# Patient Record
Sex: FEMALE | Race: ASIAN | NOT HISPANIC OR LATINO | Employment: OTHER | URBAN - METROPOLITAN AREA
[De-identification: names, ages, dates, MRNs, and addresses within clinical notes are randomized per-mention and may not be internally consistent; named-entity substitution may affect disease eponyms.]

---

## 2017-05-25 ENCOUNTER — OFFICE VISIT (OUTPATIENT)
Dept: PLASTIC SURGERY | Facility: IMAGING CENTER | Age: 82
End: 2017-05-25
Payer: MEDICARE

## 2017-05-25 DIAGNOSIS — L72.0 EPITHELIAL INCLUSION CYST: ICD-10-CM

## 2017-05-25 DIAGNOSIS — L91.8 SKIN TAG: ICD-10-CM

## 2017-05-25 PROCEDURE — 4040F PNEUMOC VAC/ADMIN/RCVD: CPT | Performed by: SURGERY

## 2017-05-25 PROCEDURE — 1101F PT FALLS ASSESS-DOCD LE1/YR: CPT | Mod: 8P | Performed by: SURGERY

## 2017-05-25 PROCEDURE — 99212 OFFICE O/P EST SF 10 MIN: CPT | Performed by: SURGERY

## 2017-05-25 PROCEDURE — G8432 DEP SCR NOT DOC, RNG: HCPCS | Performed by: SURGERY

## 2017-05-25 PROCEDURE — G8420 CALC BMI NORM PARAMETERS: HCPCS | Performed by: SURGERY

## 2017-05-25 PROCEDURE — 1036F TOBACCO NON-USER: CPT | Performed by: SURGERY

## 2017-05-25 NOTE — PROGRESS NOTES
S:  Patient with previous excision of back lesion, here today for evaluation of multiple face and neck lesions.  Back healing well.  New lesions non-painful, but several of them are skin tags that get caught on her shirt.    O:  Face with several small epithelial inclusion cysts in forehead and cheeks.  Several skin tags on anterior neck.    A:  Face and neck lesions.    P:  Will schedule for excision in the office.

## 2017-05-25 NOTE — MR AVS SNAPSHOT
Lizz Saldana   2017 11:45 AM   Office Visit   MRN: [id number]    Department:  [facility]   Dept Phone:  [phone]    Description:  Female : 1927   Provider:  Luis Eduardo Botello M.D.           Allergies as of 2017     Allergen Noted Reactions    Codeine 2009   Nausea    Floxin [Ofloxacin] 2013   Itching, Swelling    Lidoderm 2011       rash    Toradol 2009   Unspecified    .      You were diagnosed with     Epithelial inclusion cyst   [2017]       Skin tag   [2018]         Vital Signs     Smoking Status                   Never Smoker            Basic Information     Date Of Birth Sex Race Ethnicity Preferred Language    1927 Female  Non- English      Your appointments     2017 10:00 AM   Office Procedure 1 HR with Luis Eduardo Botello M.D.   [facility] ([facility])    [address] [address]   [phone]              Problem List              ICD-10-CM Priority Class Noted - Resolved    Hyperlipidemia E78.5   Unknown - Present    DJD (degenerative joint disease) M19.90   Unknown - Present    HX: breast cancer Z85.3   Unknown - Present    Tubular adenoma D36.9   Unknown - Present    Postherpetic neuralgia B02.29   Unknown - Present    Bladder incontinence R32   Unknown - Present    HTN (hypertension), benign I10   2009 - Present    Osteoporosis M81.0   2011 - Present    Macular degeneration H35.30   2012 - Present    Pigmented skin lesion L81.9   2015 - Present    Primary localized osteoarthrosis, lower leg M17.10   2015 - Present    Primary osteoarthritis of knee M17.10   10/12/2015 - Present    Calcific tendinitis of right shoulder M75.31   2016 - Present      Health Maintenance        Date Due Completion Dates    IMM ZOSTER VACCINE 1987 ---    IMM DTaP/Tdap/Td Vaccine (1 - Tdap) 2009    IMM PNEUMOCOCCAL 65+ (ADULT) LOW/MEDIUM RISK  SERIES (2 of 2 - PCV13) 11/8/2014 11/8/2013    MAMMOGRAM 10/30/2016 10/30/2015, 9/1/2008    BONE DENSITY 6/17/2021 6/17/2016, 10/16/2013, 3/25/2010, 7/10/2007, 7/1/2007    COLONOSCOPY 12/7/2022 12/7/2012 (Declined)    Override on 12/7/2012: Patient Declined            Current Immunizations     Influenza Vaccine Adult HD 10/22/2015, 10/7/2014    Pneumococcal polysaccharide vaccine (PPSV-23) 11/8/2013    TD Vaccine 7/28/2009      Below and/or attached are the medications your provider expects you to take. Review all of your home medications and newly ordered medications with your provider and/or pharmacist. Follow medication instructions as directed by your provider and/or pharmacist. Please keep your medication list with you and share with your provider. Update the information when medications are discontinued, doses are changed, or new medications (including over-the-counter products) are added; and carry medication information at all times in the event of emergency situations     Allergies:  CODEINE - Nausea     FLOXIN - Itching,Swelling     LIDODERM - (reactions not documented)     TORADOL - Unspecified               Medications  Valid as of: May 25, 2017 - 11:46 AM    Generic Name Brand Name Tablet Size Instructions for use    Cholecalciferol   Take 5,000 Units by mouth every day.        Denosumab   Inject  as instructed.        Felodipine (TABLET SR 24 HR) PLENDIL 5 MG Take 1 Tab by mouth every day. Take 1 tablet orally every day for blood pressure.        Gemfibrozil (Tab) LOPID 600 MG Take 1 Tab by mouth 2 times a day.        Lovastatin (Tab) MEVACOR 20 MG Take 1 Tab by mouth every day.        Multiple Vitamins-Calcium   Take  by mouth every day.        Multiple Vitamins-Minerals (Tab) Ocuvite PreserVision  Take  by mouth. Two tabs bid        .                 Medicines prescribed today were sent to:     [facility] - [place], [place] - [facility]    [address]  [address]    Phone: [phone] Fax: [phone]    Open 24 Hours?: No    [id number] [place] [address] [address]    Phone: [phone] Fax: [phone]    Open 24 Hours?: No      Medication refill instructions:       If your prescription bottle indicates you have medication refills left, it is not necessary to call your provider’s office. Please contact your pharmacy and they will refill your medication.    If your prescription bottle indicates you do not have any refills left, you may request refills at any time through one of the following ways: The online [email or web address] system (except Urgent Care), by calling your provider’s office, or by asking your pharmacy to contact your provider’s office with a refill request. Medication refills are processed only during regular business hours and may not be available until the next business day. Your provider may request additional information or to have a follow-up visit with you prior to refilling your medication.   *Please Note: Medication refills are assigned a new Rx number when refilled electronically. Your pharmacy may indicate that no refills were authorized even though a new prescription for the same medication is available at the pharmacy. Please request the medicine by name with the pharmacy before contacting your provider for a refill.           MyChart Status: Patient Declined

## 2017-06-15 ENCOUNTER — OFFICE VISIT (OUTPATIENT)
Dept: PLASTIC SURGERY | Facility: IMAGING CENTER | Age: 82
End: 2017-06-15
Payer: MEDICARE

## 2017-06-15 DIAGNOSIS — L72.0 INCLUSION CYST: ICD-10-CM

## 2017-06-15 DIAGNOSIS — L91.8 SKIN TAG: ICD-10-CM

## 2017-06-15 PROCEDURE — 10040 EXTRACTION: CPT | Performed by: SURGERY

## 2017-06-15 PROCEDURE — 11200 RMVL SKIN TAGS UP TO&INC 15: CPT | Performed by: SURGERY

## 2017-06-15 NOTE — PROGRESS NOTES
Here for excision of multiple small epithelial inclusion cysts, and skin tags to face and neck.  No change since last seen.    PROCEDURE:    Areas anesthetized with 1% lidocaine with epinephrine.  Sterile prep and procedure.  Skin over two epithelial inclusion cysts on forehead, and two on right cheek opened with #11 blade; cysts easily extracted.  Larger inclusion cyst on forehead excised with ellipse of skin, closed with 5-0 fast absorbing suture.  Three large skin tags on neck excised with scissors at base; two required 5-0 sutures to close.   All areas covered with antibiotic ointment.

## 2017-06-15 NOTE — MR AVS SNAPSHOT
Lizz Saldana   6/15/2017 12:00 PM   Appointment   MRN: [id number]    Department:  [facility]   Dept Phone:  [phone]    Description:  Female : 1927   Provider:  Luis Eduardo Botello M.D.           Allergies as of 6/15/2017     Allergen Noted Reactions    Codeine 2009   Nausea    Floxin [Ofloxacin] 2013   Itching, Swelling    Lidoderm 2011       rash    Toradol 2009   Unspecified    .      Vital Signs     Smoking Status                   Never Smoker            Basic Information     Date Of Birth Sex Race Ethnicity Preferred Language    1927 Female  Non- English      Problem List              ICD-10-CM Priority Class Noted - Resolved    Hyperlipidemia E78.5   Unknown - Present    DJD (degenerative joint disease) M19.90   Unknown - Present    HX: breast cancer Z85.3   Unknown - Present    Tubular adenoma D36.9   Unknown - Present    Postherpetic neuralgia B02.29   Unknown - Present    Bladder incontinence R32   Unknown - Present    HTN (hypertension), benign I10   2009 - Present    Osteoporosis M81.0   2011 - Present    Macular degeneration H35.30   2012 - Present    Pigmented skin lesion L81.9   2015 - Present    Primary localized osteoarthrosis, lower leg M17.10   2015 - Present    Primary osteoarthritis of knee M17.10   10/12/2015 - Present    Calcific tendinitis of right shoulder M75.31   2016 - Present      Health Maintenance        Date Due Completion Dates    IMM ZOSTER VACCINE 1987 ---    IMM DTaP/Tdap/Td Vaccine (1 - Tdap) 2009    IMM PNEUMOCOCCAL 65+ (ADULT) LOW/MEDIUM RISK SERIES (2 of 2 - PCV13) 2014    MAMMOGRAM 10/30/2016 10/30/2015, 2008    BONE DENSITY 2021, 10/16/2013, 3/25/2010, 7/10/2007, 2007    COLONOSCOPY 2022 (Declined)    Override on 2012: Patient Declined            Current Immunizations     Influenza Vaccine Adult HD  10/22/2015, 10/7/2014    Pneumococcal polysaccharide vaccine (PPSV-23) 11/8/2013    TD Vaccine 7/28/2009      Below and/or attached are the medications your provider expects you to take. Review all of your home medications and newly ordered medications with your provider and/or pharmacist. Follow medication instructions as directed by your provider and/or pharmacist. Please keep your medication list with you and share with your provider. Update the information when medications are discontinued, doses are changed, or new medications (including over-the-counter products) are added; and carry medication information at all times in the event of emergency situations     Allergies:  CODEINE - Nausea     FLOXIN - Itching,Swelling     LIDODERM - (reactions not documented)     TORADOL - Unspecified               Medications  Valid as of: Yuliana 15, 2017 - 12:06 PM    Generic Name Brand Name Tablet Size Instructions for use    Cholecalciferol   Take 5,000 Units by mouth every day.        Denosumab   Inject  as instructed.        Felodipine (TABLET SR 24 HR) PLENDIL 5 MG Take 1 Tab by mouth every day. Take 1 tablet orally every day for blood pressure.        Gemfibrozil (Tab) LOPID 600 MG Take 1 Tab by mouth 2 times a day.        Lovastatin (Tab) MEVACOR 20 MG Take 1 Tab by mouth every day.        Multiple Vitamins-Calcium   Take  by mouth every day.        Multiple Vitamins-Minerals (Tab) Ocuvite PreserVision  Take  by mouth. Two tabs bid        .                 Medicines prescribed today were sent to:     [facility] - [address] [address]    Phone: [phone] Fax: [phone]    Open 24 Hours?: No    [facility] #[id number] - [place] - [address] [place] [address]    Phone: [phone] Fax: [phone]    Open 24 Hours?: No      Medication refill instructions:       If your prescription bottle indicates you have medication  refills left, it is not necessary to call your provider’s office. Please contact your pharmacy and they will refill your medication.    If your prescription bottle indicates you do not have any refills left, you may request refills at any time through one of the following ways: The online [employer] system (except Urgent Care), by calling your provider’s office, or by asking your pharmacy to contact your provider’s office with a refill request. Medication refills are processed only during regular business hours and may not be available until the next business day. Your provider may request additional information or to have a follow-up visit with you prior to refilling your medication.   *Please Note: Medication refills are assigned a new Rx number when refilled electronically. Your pharmacy may indicate that no refills were authorized even though a new prescription for the same medication is available at the pharmacy. Please request the medicine by name with the pharmacy before contacting your provider for a refill.           [employer] Status: Patient Declined

## 2017-07-18 DIAGNOSIS — I10 HTN (HYPERTENSION), BENIGN: ICD-10-CM

## 2017-07-18 DIAGNOSIS — E78.5 HYPERLIPIDEMIA, UNSPECIFIED HYPERLIPIDEMIA TYPE: ICD-10-CM

## 2017-07-18 DIAGNOSIS — M81.0 OSTEOPOROSIS, UNSPECIFIED OSTEOPOROSIS TYPE, UNSPECIFIED PATHOLOGICAL FRACTURE PRESENCE: ICD-10-CM

## 2017-07-18 RX ORDER — GEMFIBROZIL 600 MG/1
TABLET, FILM COATED ORAL
Qty: 180 TAB | Refills: 0 | Status: SHIPPED | OUTPATIENT
Start: 2017-07-18 | End: 2017-10-22 | Stop reason: SDUPTHER

## 2017-07-18 RX ORDER — LOVASTATIN 20 MG/1
TABLET ORAL
Qty: 90 TAB | Refills: 0 | Status: SHIPPED | OUTPATIENT
Start: 2017-07-18 | End: 2017-10-22 | Stop reason: SDUPTHER

## 2017-08-22 ENCOUNTER — OFFICE VISIT (OUTPATIENT)
Dept: URGENT CARE | Facility: PHYSICIAN GROUP | Age: 82
End: 2017-08-22
Payer: MEDICARE

## 2017-08-22 VITALS
OXYGEN SATURATION: 96 % | HEIGHT: 60 IN | BODY MASS INDEX: 20.1 KG/M2 | HEART RATE: 85 BPM | WEIGHT: 102.38 LBS | SYSTOLIC BLOOD PRESSURE: 138 MMHG | RESPIRATION RATE: 16 BRPM | DIASTOLIC BLOOD PRESSURE: 80 MMHG | TEMPERATURE: 99.1 F

## 2017-08-22 DIAGNOSIS — M43.6 TORTICOLLIS: ICD-10-CM

## 2017-08-22 PROCEDURE — 96372 THER/PROPH/DIAG INJ SC/IM: CPT | Performed by: PHYSICIAN ASSISTANT

## 2017-08-22 PROCEDURE — 99213 OFFICE O/P EST LOW 20 MIN: CPT | Mod: 25 | Performed by: PHYSICIAN ASSISTANT

## 2017-08-22 RX ORDER — METHYLPREDNISOLONE SODIUM SUCCINATE 125 MG/2ML
125 INJECTION, POWDER, LYOPHILIZED, FOR SOLUTION INTRAMUSCULAR; INTRAVENOUS ONCE
Status: COMPLETED | OUTPATIENT
Start: 2017-08-22 | End: 2017-08-22

## 2017-08-22 RX ORDER — AMOXICILLIN 500 MG/1
CAPSULE ORAL
COMMUNITY
Start: 2017-07-16 | End: 2017-08-25

## 2017-08-22 RX ADMIN — METHYLPREDNISOLONE SODIUM SUCCINATE 125 MG: 125 INJECTION, POWDER, LYOPHILIZED, FOR SOLUTION INTRAMUSCULAR; INTRAVENOUS at 13:37

## 2017-08-22 ASSESSMENT — ENCOUNTER SYMPTOMS
ABDOMINAL PAIN: 0
NAUSEA: 0
CONSTITUTIONAL NEGATIVE: 1
EYES NEGATIVE: 1
NECK PAIN: 1
HEADACHES: 0
RESPIRATORY NEGATIVE: 1
MYALGIAS: 1
CARDIOVASCULAR NEGATIVE: 1
BACK PAIN: 0
VOMITING: 0
NEUROLOGICAL NEGATIVE: 1
SORE THROAT: 0

## 2017-08-22 NOTE — MR AVS SNAPSHOT
Lizz Saldana   2017 12:45 PM   Office Visit   MRN: [id number]    Department:  [facility]   Dept Phone:  [phone]    Description:  Female : 1927   Provider:  Ktahi Kebede PA-C           Reason for Visit     Neck Pain C/o stiff neck x2 days.  Started with the RT side, and now LT.  Getting worse      Allergies as of 2017     Allergen Noted Reactions    Codeine 2009   Nausea    Floxin [Ofloxacin] 2013   Itching, Swelling    Lidoderm 2011       rash    Toradol 2009   Unspecified    .      You were diagnosed with     Torticollis   [191712]         Vital Signs     Blood Pressure Pulse Temperature Respirations Height Weight    138/80 mmHg 85 37.3 °C (99.1 °F) 16 1.524 m (5') 46.437 kg (102 lb 6 oz)    Body Mass Index Oxygen Saturation Smoking Status             19.99 kg/m2 96% Never Smoker          Basic Information     Date Of Birth Sex Race Ethnicity Preferred Language    1927 Female  Non- English      Your appointments     Aug 30, 2017  3:20 PM   ANNUAL EXAM PREVENTATIVE with TREVA Jones   [facility])    [address], [address]  [place] [address] [phone]              Problem List              ICD-10-CM Priority Class Noted - Resolved    Hyperlipidemia E78.5   Unknown - Present    DJD (degenerative joint disease) M19.90   Unknown - Present    HX: breast cancer Z85.3   Unknown - Present    Tubular adenoma D36.9   Unknown - Present    Postherpetic neuralgia B02.29   Unknown - Present    Bladder incontinence R32   Unknown - Present    HTN (hypertension), benign I10   2009 - Present    Osteoporosis M81.0   2011 - Present    Macular degeneration H35.30   2012 - Present    Pigmented skin lesion L81.9   2015 - Present    Primary localized osteoarthrosis, lower leg M17.10   2015 - Present    Primary osteoarthritis of knee M17.10   10/12/2015 - Present    Calcific tendinitis of right shoulder M75.31   5/27/2016 - Present      Health Maintenance        Date Due Completion Dates    IMM ZOSTER VACCINE 8/4/1987 ---    IMM DTaP/Tdap/Td Vaccine (1 - Tdap) 7/29/2009 7/28/2009    IMM PNEUMOCOCCAL 65+ (ADULT) LOW/MEDIUM RISK SERIES (2 of 2 - PCV13) 11/8/2014 11/8/2013    MAMMOGRAM 10/30/2016 10/30/2015, 9/1/2008    IMM INFLUENZA (1) 9/1/2017 10/22/2015, 10/7/2014    BONE DENSITY 6/17/2021 6/17/2016, 10/16/2013, 3/25/2010, 7/10/2007, 7/1/2007    COLONOSCOPY 12/7/2022 12/7/2012 (Declined)    Override on 12/7/2012: Patient Declined            Current Immunizations     Influenza Vaccine Adult HD 10/22/2015, 10/7/2014    Pneumococcal polysaccharide vaccine (PPSV-23) 11/8/2013    TD Vaccine 7/28/2009      Below and/or attached are the medications your provider expects you to take. Review all of your home medications and newly ordered medications with your provider and/or pharmacist. Follow medication instructions as directed by your provider and/or pharmacist. Please keep your medication list with you and share with your provider. Update the information when medications are discontinued, doses are changed, or new medications (including over-the-counter products) are added; and carry medication information at all times in the event of emergency situations     Allergies:  CODEINE - Nausea     FLOXIN - Itching,Swelling     LIDODERM - (reactions not documented)     TORADOL - Unspecified               Medications  Valid as of: August 22, 2017 -  2:20 PM    Generic Name Brand Name Tablet Size Instructions for use    Amoxicillin (Cap) AMOXIL 500 MG         Cholecalciferol   Take 5,000 Units by mouth every day.        Denosumab   Inject  as instructed.        Felodipine (TABLET SR 24 HR) PLENDIL 5 MG Take 1 Tab by mouth every day. Take 1 tablet orally every day for blood pressure.        Gemfibrozil (Tab) LOPID 600 MG TAKE 1 TABLET TWICE A DAY        Lovastatin (Tab) MEVACOR 20 MG TAKE 1  TABLET DAILY        Multiple Vitamins-Calcium   Take  by mouth every day.        Multiple Vitamins-Minerals (Tab) Ocuvite PreserVision  Take  by mouth. Two tabs bid        .                 Medicines prescribed today were sent to:     [facility] #[id number] - [address] [place] [address]    Phone: [phone] Fax: [phone]    Open 24 Hours?: No      Medication refill instructions:       If your prescription bottle indicates you have medication refills left, it is not necessary to call your provider’s office. Please contact your pharmacy and they will refill your medication.    If your prescription bottle indicates you do not have any refills left, you may request refills at any time through one of the following ways: The online [employer] system (except Urgent Care), by calling your provider’s office, or by asking your pharmacy to contact your provider’s office with a refill request. Medication refills are processed only during regular business hours and may not be available until the next business day. Your provider may request additional information or to have a follow-up visit with you prior to refilling your medication.   *Please Note: Medication refills are assigned a new Rx number when refilled electronically. Your pharmacy may indicate that no refills were authorized even though a new prescription for the same medication is available at the pharmacy. Please request the medicine by name with the pharmacy before contacting your provider for a refill.        Instructions    Acute Torticollis  Torticollis is a condition in which the muscles of the neck tighten (contract) abnormally, causing the neck to twist and the head to move into an unnatural position. Torticollis that develops suddenly is called acute torticollis. If torticollis becomes chronic and is left untreated, the face and neck can become deformed.  CAUSES  This condition may be caused by:  · Sleeping in an awkward  position (common).  · Extending or twisting the neck muscles beyond their normal position.  In some cases, the cause may not be known.  SYMPTOMS  Symptoms of this condition include:  · An unnatural position of the head.  · Neck pain.  · A limited ability to move the neck.  · Twisting of the neck to one side.  DIAGNOSIS  This condition is diagnosed with a physical exam. You may also have imaging tests, such as an X-ray, CT scan, or MRI.  TREATMENT  Treatment for this condition involves trying to relax the neck muscles. It may include:  · Medicines or shots.  · Physical therapy.  · Surgery. This may be done in severe cases.  HOME CARE INSTRUCTIONS  · Take medicines only as directed by your health care provider.  · Do stretching exercises and massage your neck as directed by your health care provider.  · Keep all follow-up visits as directed by your health care provider. This is important.  SEEK MEDICAL CARE IF:  · You develop a fever.  SEEK IMMEDIATE MEDICAL CARE IF:  · You develop difficulty breathing.  · You develop noisy breathing (stridor).  · You start drooling.  · You have trouble swallowing or have pain with swallowing.  · You develop numbness or weakness in your hands or feet.  · You have changes in your speech, understanding, or vision.  · Your pain gets worse.     This information is not intended to replace advice given to you by your health care provider. Make sure you discuss any questions you have with your health care provider.     Document Released: 12/15/2001 Document Revised: 05/03/2016 Document Reviewed: 12/14/2015  [employer] Interactive Patient Education ©2016 [employer]            MyChart Status: Patient Declined

## 2017-08-22 NOTE — PROGRESS NOTES
Subjective:      Lizz Saldana is a 89 y.o. female who presents with Neck Pain        Neck Pain   Pertinent negatives include no headaches.   Patient presents today for 2 days of neck pain and stiffness.  Patient states it started on the right side of her neck and now that is better and now is more on the left side of her neck.  She states it hurts to move most directions however moving to the left is worse.  She denies headache, eye pain, jaw pain, arm pain, numbness, tingling or chest pain. No sore throat, URI symptoms, fevers or chills.  No light sensitivity.   She denies trauma but notes she has had issues with neck pain in the past.  She may have slept it wrong she states.  Driving is difficult due to the pain.      Review of Systems   Constitutional: Negative.    HENT: Negative for ear pain and sore throat.    Eyes: Negative.    Respiratory: Negative.    Cardiovascular: Negative.    Gastrointestinal: Negative for nausea, vomiting and abdominal pain.   Musculoskeletal: Positive for myalgias and neck pain. Negative for back pain.   Skin: Negative.    Neurological: Negative.  Negative for headaches.   Endo/Heme/Allergies: Negative.        PMH:  has a past medical history of HTN; Hyperlipidemia; breast cancer; Tubular adenoma; Postherpetic neuralgia; Osteoporosis; DJD (degenerative joint disease); Pigmented skin lesion (4/30/2015); Arthritis (2015); Cancer ([facility]) (1990); High cholesterol; Pain (7-2015); and Bilateral cataracts.  MEDS:   Current outpatient prescriptions:   •  lovastatin (MEVACOR) 20 MG Tab, TAKE 1 TABLET DAILY, Disp: 90 Tab, Rfl: 0  •  gemfibrozil (LOPID) 600 MG Tab, TAKE 1 TABLET TWICE A DAY, Disp: 180 Tab, Rfl: 0  •  felodipine (PLENDIL) 5 MG TABLET SR 24 HR, Take 1 Tab by mouth every day. Take 1 tablet orally every day for blood pressure., Disp: 90 Tab, Rfl: 2  •  Multiple Vitamins-Calcium (ONE-A-DAY WOMENS PO), Take  by mouth every day., Disp: , Rfl:   •  Cholecalciferol (VITAMIN D-3 PO),  Take 5,000 Units by mouth every day., Disp: , Rfl:   •  Multiple Vitamins-Minerals (OCUVITE PRESERVISION) TABS, Take  by mouth. Two tabs bid, Disp: 120 Tab, Rfl: 6  •  amoxicillin (AMOXIL) 500 MG Cap, , Disp: , Rfl:   •  Denosumab (PROLIA SC), Inject  as instructed., Disp: , Rfl:   ALLERGIES:   Allergies   Allergen Reactions   • Codeine Nausea   • Floxin [Ofloxacin] Itching and Swelling   • Lidoderm      rash   • Toradol Unspecified     .     SURGHX:   Past Surgical History   Procedure Laterality Date   • Rotator cuff repair  1990     Left shoulder   • Cataract extraction with iol  2000     Bilateral   • Cyst excision Left 1989     ganglion cyst removal, wrist   • Mastectomy  1990     Left   • Hysterectomy, total abdominal  1970   • Knee arthroplasty total Right 7/13/2015     Procedure: KNEE ARTHROPLASTY TOTAL;  Surgeon: Ashish Serrano M.D.;  Location: SURGERY [facility];  Service:    • Knee manipulation Right 10/12/2015     Procedure: KNEE MANIPULATION;  Surgeon: Ashish Serrano M.D.;  Location: SURGERY [facility];  Service:      SOCHX:  reports that she has never smoked. She has never used smokeless tobacco. She reports that she does not drink alcohol or use illicit drugs.  FH: Family history was reviewed, no pertinent findings to report     Objective:     /80 mmHg  Pulse 85  Temp(Src) 37.3 °C (99.1 °F)  Resp 16  Ht 1.524 m (5')  Wt 46.437 kg (102 lb 6 oz)  BMI 19.99 kg/m2  SpO2 96%     Physical Exam   Constitutional: She is oriented to person, place, and time. She appears well-nourished. No distress.   HENT:   Head: Normocephalic and atraumatic.   Nose: Nose normal.   Mouth/Throat: Oropharynx is clear and moist. No oropharyngeal exudate.   Eyes: Conjunctivae and EOM are normal. Pupils are equal, round, and reactive to light.   Neck: Neck supple.       Cardiovascular: Normal rate and regular rhythm.    Pulmonary/Chest: Effort normal and breath sounds normal. No stridor.    Lymphadenopathy:     She has no cervical adenopathy.   Neurological: She is alert and oriented to person, place, and time.   Skin: Skin is warm and dry. No rash noted.   Psychiatric: She has a normal mood and affect. Her behavior is normal.   Vitals reviewed.          Assessment/Plan:     1. Torticollis  methylPREDNISolone sod succ (SOLU-MEDROL) 125 MG injection 125 mg       -given hot packs in clinic and patient states this helped it feel much better  -patient is afebrile, without headaches or any meningeal signs  -consistent with acute torticollis.  Patient allergic to Toradol, given Medrol shot for inflammation and recommend Tylenol ES as adjunct for pain.  Continue heat and stretches  -red flags discussed in detail with patient who expresses understanding.       Supportive care, differential diagnoses, and indications for immediate follow-up discussed with patient.   Pathogenesis of diagnosis discussed including typical length and natural progression.   Instructed to return to clinic or nearest emergency department for any change in condition, further concerns, or worsening of symptoms.  Patient states understanding of the plan of care and discharge instructions.  Instructed to make an appointment, for follow up, with their primary care provider.      Kathi Kebede PA-C

## 2017-08-22 NOTE — PATIENT INSTRUCTIONS
Acute Torticollis  Torticollis is a condition in which the muscles of the neck tighten (contract) abnormally, causing the neck to twist and the head to move into an unnatural position. Torticollis that develops suddenly is called acute torticollis. If torticollis becomes chronic and is left untreated, the face and neck can become deformed.  CAUSES  This condition may be caused by:  · Sleeping in an awkward position (common).  · Extending or twisting the neck muscles beyond their normal position.  In some cases, the cause may not be known.  SYMPTOMS  Symptoms of this condition include:  · An unnatural position of the head.  · Neck pain.  · A limited ability to move the neck.  · Twisting of the neck to one side.  DIAGNOSIS  This condition is diagnosed with a physical exam. You may also have imaging tests, such as an X-ray, CT scan, or MRI.  TREATMENT  Treatment for this condition involves trying to relax the neck muscles. It may include:  · Medicines or shots.  · Physical therapy.  · Surgery. This may be done in severe cases.  HOME CARE INSTRUCTIONS  · Take medicines only as directed by your health care provider.  · Do stretching exercises and massage your neck as directed by your health care provider.  · Keep all follow-up visits as directed by your health care provider. This is important.  SEEK MEDICAL CARE IF:  · You develop a fever.  SEEK IMMEDIATE MEDICAL CARE IF:  · You develop difficulty breathing.  · You develop noisy breathing (stridor).  · You start drooling.  · You have trouble swallowing or have pain with swallowing.  · You develop numbness or weakness in your hands or feet.  · You have changes in your speech, understanding, or vision.  · Your pain gets worse.     This information is not intended to replace advice given to you by your health care provider. Make sure you discuss any questions you have with your health care provider.     Document Released: 12/15/2001 Document Revised: 05/03/2016 Document  Reviewed: 12/14/2015  [email or web address] Interactive Patient Education ©2016 [employer]

## 2017-08-25 ENCOUNTER — HOSPITAL ENCOUNTER (EMERGENCY)
Facility: MEDICAL CENTER | Age: 82
End: 2017-08-25
Attending: EMERGENCY MEDICINE
Payer: MEDICARE

## 2017-08-25 ENCOUNTER — OFFICE VISIT (OUTPATIENT)
Dept: URGENT CARE | Facility: PHYSICIAN GROUP | Age: 82
End: 2017-08-25
Payer: MEDICARE

## 2017-08-25 VITALS
SYSTOLIC BLOOD PRESSURE: 130 MMHG | HEIGHT: 60 IN | HEART RATE: 80 BPM | DIASTOLIC BLOOD PRESSURE: 60 MMHG | WEIGHT: 101 LBS | OXYGEN SATURATION: 98 % | BODY MASS INDEX: 19.83 KG/M2 | RESPIRATION RATE: 16 BRPM | TEMPERATURE: 98 F

## 2017-08-25 VITALS
DIASTOLIC BLOOD PRESSURE: 61 MMHG | BODY MASS INDEX: 20.4 KG/M2 | WEIGHT: 101.19 LBS | RESPIRATION RATE: 16 BRPM | HEART RATE: 78 BPM | OXYGEN SATURATION: 95 % | TEMPERATURE: 96.4 F | HEIGHT: 59 IN | SYSTOLIC BLOOD PRESSURE: 129 MMHG

## 2017-08-25 DIAGNOSIS — R21 BLISTERING RASH: ICD-10-CM

## 2017-08-25 DIAGNOSIS — M43.6 STIFF NECK: ICD-10-CM

## 2017-08-25 DIAGNOSIS — T14.8XXA BLISTERED SKIN: ICD-10-CM

## 2017-08-25 LAB
ALBUMIN SERPL BCP-MCNC: 3.3 G/DL (ref 3.2–4.9)
ALBUMIN/GLOB SERPL: 0.9 G/DL
ALP SERPL-CCNC: 127 U/L (ref 30–99)
ALT SERPL-CCNC: 17 U/L (ref 2–50)
ANION GAP SERPL CALC-SCNC: 12 MMOL/L (ref 0–11.9)
AST SERPL-CCNC: 22 U/L (ref 12–45)
BASOPHILS # BLD AUTO: 0.3 % (ref 0–1.8)
BASOPHILS # BLD: 0.03 K/UL (ref 0–0.12)
BILIRUB SERPL-MCNC: 1.1 MG/DL (ref 0.1–1.5)
BUN SERPL-MCNC: 15 MG/DL (ref 8–22)
CALCIUM SERPL-MCNC: 8.9 MG/DL (ref 8.4–10.2)
CHLORIDE SERPL-SCNC: 104 MMOL/L (ref 96–112)
CO2 SERPL-SCNC: 24 MMOL/L (ref 20–33)
CREAT SERPL-MCNC: 0.63 MG/DL (ref 0.5–1.4)
EOSINOPHIL # BLD AUTO: 0.37 K/UL (ref 0–0.51)
EOSINOPHIL NFR BLD: 3.6 % (ref 0–6.9)
ERYTHROCYTE [DISTWIDTH] IN BLOOD BY AUTOMATED COUNT: 40.6 FL (ref 35.9–50)
GFR SERPL CREATININE-BSD FRML MDRD: >60 ML/MIN/1.73 M 2
GLOBULIN SER CALC-MCNC: 3.5 G/DL (ref 1.9–3.5)
GLUCOSE SERPL-MCNC: 89 MG/DL (ref 65–99)
HCT VFR BLD AUTO: 37.3 % (ref 37–47)
HGB BLD-MCNC: 12.9 G/DL (ref 12–16)
IMM GRANULOCYTES # BLD AUTO: 0.11 K/UL (ref 0–0.11)
IMM GRANULOCYTES NFR BLD AUTO: 1.1 % (ref 0–0.9)
LYMPHOCYTES # BLD AUTO: 2.65 K/UL (ref 1–4.8)
LYMPHOCYTES NFR BLD: 26.1 % (ref 22–41)
MCH RBC QN AUTO: 29.9 PG (ref 27–33)
MCHC RBC AUTO-ENTMCNC: 34.6 G/DL (ref 33.6–35)
MCV RBC AUTO: 86.5 FL (ref 81.4–97.8)
MONOCYTES # BLD AUTO: 0.89 K/UL (ref 0–0.85)
MONOCYTES NFR BLD AUTO: 8.8 % (ref 0–13.4)
NEUTROPHILS # BLD AUTO: 6.1 K/UL (ref 2–7.15)
NEUTROPHILS NFR BLD: 60.1 % (ref 44–72)
NRBC # BLD AUTO: 0 K/UL
NRBC BLD AUTO-RTO: 0 /100 WBC
PLATELET # BLD AUTO: 434 K/UL (ref 164–446)
PMV BLD AUTO: 9.1 FL (ref 9–12.9)
POTASSIUM SERPL-SCNC: 3.2 MMOL/L (ref 3.6–5.5)
PROT SERPL-MCNC: 6.8 G/DL (ref 6–8.2)
RBC # BLD AUTO: 4.31 M/UL (ref 4.2–5.4)
SODIUM SERPL-SCNC: 140 MMOL/L (ref 135–145)
WBC # BLD AUTO: 10.2 K/UL (ref 4.8–10.8)

## 2017-08-25 PROCEDURE — 80053 COMPREHEN METABOLIC PANEL: CPT

## 2017-08-25 PROCEDURE — 99284 EMERGENCY DEPT VISIT MOD MDM: CPT

## 2017-08-25 PROCEDURE — 85025 COMPLETE CBC W/AUTO DIFF WBC: CPT

## 2017-08-25 PROCEDURE — 700111 HCHG RX REV CODE 636 W/ 250 OVERRIDE (IP): Performed by: EMERGENCY MEDICINE

## 2017-08-25 PROCEDURE — 96375 TX/PRO/DX INJ NEW DRUG ADDON: CPT

## 2017-08-25 PROCEDURE — 96374 THER/PROPH/DIAG INJ IV PUSH: CPT

## 2017-08-25 PROCEDURE — 99214 OFFICE O/P EST MOD 30 MIN: CPT | Performed by: PHYSICIAN ASSISTANT

## 2017-08-25 PROCEDURE — 36415 COLL VENOUS BLD VENIPUNCTURE: CPT

## 2017-08-25 RX ORDER — DIPHENHYDRAMINE HYDROCHLORIDE 50 MG/ML
12.5 INJECTION INTRAMUSCULAR; INTRAVENOUS ONCE
Status: COMPLETED | OUTPATIENT
Start: 2017-08-25 | End: 2017-08-25

## 2017-08-25 RX ORDER — DEXAMETHASONE SODIUM PHOSPHATE 4 MG/ML
4 INJECTION, SOLUTION INTRA-ARTICULAR; INTRALESIONAL; INTRAMUSCULAR; INTRAVENOUS; SOFT TISSUE ONCE
Status: COMPLETED | OUTPATIENT
Start: 2017-08-25 | End: 2017-08-25

## 2017-08-25 RX ORDER — CHOLECALCIFEROL (VITAMIN D3) 125 MCG
5000 CAPSULE ORAL DAILY
Status: SHIPPED | COMMUNITY
End: 2024-02-22

## 2017-08-25 RX ADMIN — DIPHENHYDRAMINE HYDROCHLORIDE 12.5 MG: 50 INJECTION, SOLUTION INTRAMUSCULAR; INTRAVENOUS at 10:32

## 2017-08-25 RX ADMIN — DEXAMETHASONE SODIUM PHOSPHATE 4 MG: 4 INJECTION, SOLUTION INTRAMUSCULAR; INTRAVENOUS at 10:32

## 2017-08-25 ASSESSMENT — ENCOUNTER SYMPTOMS
RESPIRATORY NEGATIVE: 1
EYES NEGATIVE: 1
NEUROLOGICAL NEGATIVE: 1
HEADACHES: 0
NAUSEA: 0
CARDIOVASCULAR NEGATIVE: 1
VOMITING: 0

## 2017-08-25 ASSESSMENT — PAIN SCALES - GENERAL: PAINLEVEL_OUTOF10: 9

## 2017-08-25 NOTE — PROGRESS NOTES
Subjective:      Lizz Saldana is a 89 y.o. female who presents with Blisters        HPI  Patient presents today for follow up to neck stiffness.  She was seen in  3 days ago for 2 days complaint of neck stiffness.  It had started on the right and moved to the left. She had ROM however painful with movement to the left.  Heat felt better, negative ROS and otherwise normal physical patient was given Solumedrol for anti-inflammatory (Toradol allergy) and given warm packs.     Patient returns today that her neck is still hurting, no worse but no better.  She is still having more pain on the left.  No fevers, chills or sweats.   She also reports since yesterday she has had bilateral leg redness, burning and large blisters on the inner aspects of her ankles.    She also feels itchy across her chest.   She denies injury or legs.  No hx of rashes similar to this.  No new meds or changes to meds (other than the single Solumedrol injection 3 days ago)   Still currently denying headache, light sensitivity, numbness, tingling or focal weakness.     Review of Systems   Constitutional:        SEE HPI   Eyes: Negative.    Respiratory: Negative.    Cardiovascular: Negative.    Gastrointestinal: Negative for nausea and vomiting.   Musculoskeletal:        SEE HPI   Skin: Positive for itching and rash.   Neurological: Negative.  Negative for headaches.   Endo/Heme/Allergies: Negative.        PMH:  has a past medical history of HTN; Hyperlipidemia; breast cancer; Tubular adenoma; Postherpetic neuralgia; Osteoporosis; DJD (degenerative joint disease); Pigmented skin lesion (4/30/2015); Arthritis (2015); Cancer (CMS-HCC) (1990); High cholesterol; Pain (7-2015); and Bilateral cataracts.  MEDS:   Current outpatient prescriptions:   •  lovastatin (MEVACOR) 20 MG Tab, TAKE 1 TABLET DAILY, Disp: 90 Tab, Rfl: 0  •  gemfibrozil (LOPID) 600 MG Tab, TAKE 1 TABLET TWICE A DAY, Disp: 180 Tab, Rfl: 0  •  felodipine (PLENDIL) 5 MG TABLET SR 24 HR,  Take 1 Tab by mouth every day. Take 1 tablet orally every day for blood pressure., Disp: 90 Tab, Rfl: 2  •  Multiple Vitamins-Calcium (ONE-A-DAY WOMENS PO), Take  by mouth every day., Disp: , Rfl:   •  Cholecalciferol (VITAMIN D-3 PO), Take 5,000 Units by mouth every day., Disp: , Rfl:   •  Multiple Vitamins-Minerals (OCUVITE PRESERVISION) TABS, Take  by mouth. Two tabs bid, Disp: 120 Tab, Rfl: 6  •  Denosumab (PROLIA SC), Inject  as instructed., Disp: , Rfl:   ALLERGIES:   Allergies   Allergen Reactions   • Codeine Nausea   • Floxin [Ofloxacin] Itching and Swelling   • Lidoderm      rash   • Toradol Unspecified     .     SURGHX:   Past Surgical History   Procedure Laterality Date   • Rotator cuff repair  1990     Left shoulder   • Cataract extraction with iol  2000     Bilateral   • Cyst excision Left 1989     ganglion cyst removal, wrist   • Mastectomy  1990     Left   • Hysterectomy, total abdominal  1970   • Knee arthroplasty total Right 7/13/2015     Procedure: KNEE ARTHROPLASTY TOTAL;  Surgeon: Ashish Serrano M.D.;  Location: SURGERY [facility];  Service:    • Knee manipulation Right 10/12/2015     Procedure: KNEE MANIPULATION;  Surgeon: Ashish Serrano M.D.;  Location: SURGERY [facility];  Service:      SOCHX:  reports that she has never smoked. She has never used smokeless tobacco. She reports that she does not drink alcohol or use illicit drugs.  FH: Family history was reviewed, no pertinent findings to report     Objective:     /60 mmHg  Pulse 80  Temp(Src) 36.7 °C (98 °F)  Resp 16  Ht 1.524 m (5')  Wt 45.813 kg (101 lb)  BMI 19.73 kg/m2  SpO2 98%     Physical Exam   Constitutional: She is oriented to person, place, and time. She appears well-developed and well-nourished. No distress.   HENT:   Head: Normocephalic and atraumatic.   Eyes: Conjunctivae and EOM are normal. Pupils are equal, round, and reactive to light.   Neck:       Cardiovascular: Normal rate and regular  rhythm.    Musculoskeletal:        Feet:    Neurological: She is alert and oriented to person, place, and time.   Skin: Skin is warm and dry. Rash noted.   Psychiatric: She has a normal mood and affect. Her behavior is normal.   Vitals reviewed.            Assessment/Plan:     1. Stiff neck     2. Blistered skin         -patient in obvious discomfort with her neck in clinic and with lower legs  -at this point there is no clear diagnosis for the bilateral blistered/erythematous skin without trauma or hx of leg swelling/dermatitis.    Concern for systemic process given entire clinical presenation.     Vitals stable however I feel given presentation and patient age she requires a more rapid work up in a more controlled setting.    Patient and  referred to ED for further work up.   Offered various options, they will decide en route.           Kathi Kebede PA-C

## 2017-08-25 NOTE — ED AVS SNAPSHOT
Toplist Access Code: Activation code not generated  Current Toplist Status: Patient Declined    Your email address is not on file at [facility].  Email Addresses are required for you to sign up for Toplist, please contact [phone] to verify your personal information and to provide your email address prior to attempting to register for Toplist.    Lizz Saldana  [address] [place] [address]    Toplist  A secure, online tool to manage your health information     [facility]’s Toplist® is a secure, online tool that connects you to your personalized health information from the privacy of your home -- day or night - making it very easy for you to manage your healthcare. Once the activation process is completed, you can even access your medical information using the Toplist jf, which is available for free in the Apple Jf store or Google Play store.     To learn more about Toplist, visit [email or web address]    There are two levels of access available (as shown below):   My Chart Features  [facility] Primary Care Doctor [facility]  Specialists [facility]  Urgent  Care Non-[facility] Primary Care Doctor   Email your healthcare team securely and privately 24/7 X X X    Manage appointments: schedule your next appointment; view details of past/upcoming appointments X      Request prescription refills. X      View recent personal medical records, including lab and immunizations X X X X   View health record, including health history, allergies, medications X X X X   Read reports about your outpatient visits, procedures, consult and ER notes X X X X   See your discharge summary, which is a recap of your hospital and/or ER visit that includes your diagnosis, lab results, and care plan X X  X     How to register for [employer]:  Once your e-mail address has been verified, follow the following steps to sign up for [employer].     1. Go to  [email or web address]  2. Click on the Sign Up Now box, which takes you to the New Member Sign Up  page. You will need to provide the following information:  a. Enter your [employer]ume Access Code exactly as it appears at the top of this page. (You will not need to use this code after you’ve completed the sign-up process. If you do not sign up before the expiration date, you must request a new code.)   b. Enter your date of birth.   c. Enter your home email address.   d. Click Submit, and follow the next screen’s instructions.  3. Create a Razume ID. This will be your Razume login ID and cannot be changed, so think of one that is secure and easy to remember.  4. Create a Razume password. You can change your password at any time.  5. Enter your Password Reset Question and Answer. This can be used at a later time if you forget your password.   6. Enter your e-mail address. This allows you to receive e-mail notifications when new information is available in Razume.  7. Click Sign Up. You can now view your health information.    For assistance activating your Razume account, call [phone]

## 2017-08-25 NOTE — ED NOTES
The Medication Reconciliation process has been completed by interviewing the patient    Allergies have been reviewed  Antibiotic use in 30 days - none    Home Pharmacy:  [facility] [place]

## 2017-08-25 NOTE — ED NOTES
Tuesday this pt received a steroid shot from an Urgent care for neck pain, today she is here fro continued severe neck pain without trauma and bilateral lower leg rash with blistering.

## 2017-08-25 NOTE — ED AVS SNAPSHOT
8/25/2017    Lizz Saldana  [address] [address]    Dear Lizz:    [facility] wants to ensure your discharge home is safe and you or your loved ones have had all of your questions answered regarding your care after you leave the hospital.    Below is a list of resources and contact information should you have any questions regarding your hospital stay, follow-up instructions, or active medical symptoms.    Questions or Concerns Regarding… Contact   Medical Questions Related to Your Discharge  (7 days a week, 8am-5pm) Contact a Nurse Care Coordinator   [phone]   Medical Questions Not Related to Your Discharge  (24 hours a day / 7 days a week)  Contact the Nurse Health Line   [phone]    Medications or Discharge Instructions Refer to your discharge packet   or contact your [facility] Primary Care Provider   [phone]   Follow-up Appointment(s) Schedule your appointment via Cylex   or contact Scheduling [phone]   Billing Review your statement via Cylex  or contact Billing [phone]   Medical Records Review your records via Cylex   or contact Medical Records [phone]     You may receive a telephone call within two days of discharge. This call is to make certain you understand your discharge instructions and have the opportunity to have any questions answered. You can also easily access your medical information, test results and upcoming appointments via the Cylex free online health management tool. You can learn more and sign up at [email or web address]. For assistance setting up your Cylex account, please call [phone].    Once again, we want to ensure your discharge home is safe and that you have a clear understanding of any next steps in your care. If you have any questions or concerns, please do not hesitate to contact us, we are here for you. Thank you for choosing [facility] for your healthcare needs.    Sincerely,    Your [facility] Healthcare Team

## 2017-08-25 NOTE — ED PROVIDER NOTES
ED Provider Note    CHIEF COMPLAINT  Chief Complaint   Patient presents with   • Neck Pain   • Rash       HPI  Lizz Saldana is a 89 y.o. female who presents for evaluation of painful rash. The patient was apparently at an urgent care a few days ago. She had reportedly a spasmodic torticollis. She was given an intramuscular shot of Solu-Medrol. She subsequently developed a painful red blistering rash on the bilateral lower extremities anteriorly as well as on the anterior chest. No reported pain or swelling to the mouth no trouble breathing. She denies high fevers or chills. No associated night sweats weight loss numbness weakness or tingling    REVIEW OF SYSTEMS  See HPI for further details. No high fevers chills night sweats weight loss numbness tingling or weakness All other systems are negative.     PAST MEDICAL HISTORY  Past Medical History   Diagnosis Date   • HTN    • Hyperlipidemia    • HX: breast cancer    • Tubular adenoma    • Postherpetic neuralgia    • Osteoporosis    • DJD (degenerative joint disease)    • Pigmented skin lesion 4/30/2015   • Arthritis 2015     left knee   • Cancer (CMS-[facility]) 1990     Breast    • High cholesterol    • Pain 7-2015     right knee   • Bilateral cataracts      bilateral IOL       FAMILY HISTORY  No history of bleeding disorder    SOCIAL HISTORY  Social History     Social History   • Marital Status:      Spouse Name: N/A   • Number of Children: N/A   • Years of Education: N/A     Social History Main Topics   • Smoking status: Never Smoker    • Smokeless tobacco: Never Used   • Alcohol Use: No   • Drug Use: No   • Sexual Activity: Not on file     Other Topics Concern   • Not on file     Social History Narrative    nonsmoker    SURGICAL HISTORY  Past Surgical History   Procedure Laterality Date   • Rotator cuff repair  1990     Left shoulder   • Cataract extraction with iol  2000     Bilateral   • Cyst excision Left 1989     ganglion cyst removal, wrist   •  "Mastectomy  1990     Left   • Hysterectomy, total abdominal  1970   • Knee arthroplasty total Right 7/13/2015     Procedure: KNEE ARTHROPLASTY TOTAL;  Surgeon: Ashish Serrano M.D.;  Location: SURGERY [facility];  Service:    • Knee manipulation Right 10/12/2015     Procedure: KNEE MANIPULATION;  Surgeon: Ashish Serrano M.D.;  Location: SURGERY [facility];  Service:        CURRENT MEDICATIONS  No current facility-administered medications for this encounter.    Current outpatient prescriptions:   •  lovastatin (MEVACOR) 20 MG Tab, TAKE 1 TABLET DAILY, Disp: 90 Tab, Rfl: 0  •  gemfibrozil (LOPID) 600 MG Tab, TAKE 1 TABLET TWICE A DAY, Disp: 180 Tab, Rfl: 0  •  felodipine (PLENDIL) 5 MG TABLET SR 24 HR, Take 1 Tab by mouth every day. Take 1 tablet orally every day for blood pressure., Disp: 90 Tab, Rfl: 2  •  Denosumab (PROLIA SC), Inject  as instructed., Disp: , Rfl:   •  Multiple Vitamins-Calcium (ONE-A-DAY WOMENS PO), Take  by mouth every day., Disp: , Rfl:   •  Cholecalciferol (VITAMIN D-3 PO), Take 5,000 Units by mouth every day., Disp: , Rfl:   •  Multiple Vitamins-Minerals (OCUVITE PRESERVISION) TABS, Take  by mouth. Two tabs bid, Disp: 120 Tab, Rfl: 6      ALLERGIES  Allergies   Allergen Reactions   • Codeine Nausea   • Floxin [Ofloxacin] Itching and Swelling   • Lidoderm      rash   • Toradol Unspecified     .       PHYSICAL EXAM  VITAL SIGNS: /61 mmHg  Pulse 80  Temp(Src) 35.8 °C (96.4 °F)  Resp 16  Ht 1.499 m (4' 11\")  Wt 45.9 kg (101 lb 3.1 oz)  BMI 20.43 kg/m2 Room air O2: 96    Constitutional: Well developed, Well nourished, No acute distress, Non-toxic appearance.   HENT: Normocephalic, Atraumatic, Bilateral external ears normal, Oropharynx moist, No oral exudates, Nose normal.   Eyes: PERRLA, EOMI, Conjunctiva normal, No discharge.   Neck: Normal range of motion, No tenderness, Supple, No stridor.   Cardiovascular: Normal heart rate, Normal rhythm, No murmurs, No rubs, " No gallops.   Thorax & Lungs: Normal breath sounds, No respiratory distress, No wheezing, No chest tenderness.   Abdomen: Bowel sounds normal, Soft, No tenderness, No masses, No pulsatile masses.   Skin: There is no erythematous, blistering rash without vesicles on the bilateral anterior legs. There is a erythematous tender rash on the anterior chest without blisters  Back: No tenderness, No CVA tenderness.   Extremities: Intact distal pulses, No edema, No tenderness, No cyanosis, No clubbing.   Musculoskeletal: Good range of motion in all major joints. No tenderness to palpation or major deformities noted.   Neurologic: Alert & oriented x 3, Normal motor function, Normal sensory function, No focal deficits noted.   Psychiatric: Anxious       COURSE & MEDICAL DECISION MAKING  Pertinent Labs & Imaging studies reviewed. (See chart for details)  Results for orders placed or performed during the hospital encounter of 08/25/17   CBC WITH DIFFERENTIAL   Result Value Ref Range    WBC 10.2 4.8 - 10.8 K/uL    RBC 4.31 4.20 - 5.40 M/uL    Hemoglobin 12.9 12.0 - 16.0 g/dL    Hematocrit 37.3 37.0 - 47.0 %    MCV 86.5 81.4 - 97.8 fL    MCH 29.9 27.0 - 33.0 pg    MCHC 34.6 33.6 - 35.0 g/dL    RDW 40.6 35.9 - 50.0 fL    Platelet Count 434 164 - 446 K/uL    MPV 9.1 9.0 - 12.9 fL    Neutrophils-Polys 60.10 44.00 - 72.00 %    Lymphocytes 26.10 22.00 - 41.00 %    Monocytes 8.80 0.00 - 13.40 %    Eosinophils 3.60 0.00 - 6.90 %    Basophils 0.30 0.00 - 1.80 %    Immature Granulocytes 1.10 (H) 0.00 - 0.90 %    Nucleated RBC 0.00 /100 WBC    Neutrophils (Absolute) 6.10 2.00 - 7.15 K/uL    Lymphs (Absolute) 2.65 1.00 - 4.80 K/uL    Monos (Absolute) 0.89 (H) 0.00 - 0.85 K/uL    Eos (Absolute) 0.37 0.00 - 0.51 K/uL    Baso (Absolute) 0.03 0.00 - 0.12 K/uL    Immature Granulocytes (abs) 0.11 0.00 - 0.11 K/uL    NRBC (Absolute) 0.00 K/uL   COMP METABOLIC PANEL   Result Value Ref Range    Sodium 140 135 - 145 mmol/L    Potassium 3.2 (L) 3.6 -  5.5 mmol/L    Chloride 104 96 - 112 mmol/L    Co2 24 20 - 33 mmol/L    Anion Gap 12.0 (H) 0.0 - 11.9    Glucose 89 65 - 99 mg/dL    Bun 15 8 - 22 mg/dL    Creatinine 0.63 0.50 - 1.40 mg/dL    Calcium 8.9 8.4 - 10.2 mg/dL    AST(SGOT) 22 12 - 45 U/L    ALT(SGPT) 17 2 - 50 U/L    Alkaline Phosphatase 127 (H) 30 - 99 U/L    Total Bilirubin 1.1 0.1 - 1.5 mg/dL    Albumin 3.3 3.2 - 4.9 g/dL    Total Protein 6.8 6.0 - 8.2 g/dL    Globulin 3.5 1.9 - 3.5 g/dL    A-G Ratio 0.9 g/dL   ESTIMATED GFR   Result Value Ref Range    GFR If African American >60 >60 mL/min/1.73 m 2    GFR If Non African American >60 >60 mL/min/1.73 m 2        The patient does not appear clinically toxic. She does have what appears to be an allergic reaction with a blistering rash to the anterior legs. The only new medication was an intramuscular shot of Solu-Medrol a few days ago which is likely the culprit. I did give her a small dose of IV Benadryl as well as Decadron. I see no evidence of bacterial infection and I will advise the patient and her  to expectantly manages these blisters and not actively try to pop them. Follow up with PCP in 2-3 days    FINAL IMPRESSION  1.  1. Blistering rash          Electronically signed by: Vignesh Gregg, 8/25/2017 9:51 AM

## 2017-08-25 NOTE — DISCHARGE INSTRUCTIONS
Drug Rash  A drug rash is a change in the color or texture of the skin that is caused by a drug. It can develop minutes, hours, or days after the person takes the drug.  CAUSES  This condition is usually caused by a drug allergy. It can also be caused by exposure to sunlight after taking a drug that makes the skin sensitive to light. Drugs that commonly cause rashes include:  · Penicillin.  · Antibiotic medicines.  · Medicines that treat seizures.  · Medicines that treat cancer (chemotherapy).  · Aspirin and other nonsteroidal anti-inflammatory drugs (NSAIDs).  · Injectable dyes that contain iodine.  · Insulin.  SYMPTOMS  Symptoms of this condition include:  · Redness.  · Tiny bumps.  · Peeling.  · Itching.  · Itchy welts (hives).  · Swelling.  The rash may appear on a small area of skin or all over the body.  DIAGNOSIS  To diagnose the condition, your health care provider will do a physical exam. He or she may also order tests to find out which drug caused the rash. Tests to find the cause of a rash include:  · Skin tests.  · Blood tests.  · Drug challenge. For this test, you stop taking all of the drugs that you do not need to take, and then you start taking them again by adding back one of the drugs at a time.  TREATMENT  A drug rash may be treated with medicines, including:  · Antihistamines. These may be given to relieve itching.  · An NSAID. This may be given to reduce swelling and treat pain.  · A steroid drug. This may be given to reduce swelling.  The rash usually goes away when the person stops taking the drug that caused it.  HOME CARE INSTRUCTIONS  · Take medicines only as directed by your health care provider.  · Let all of your health care providers know about any drug reactions you have had in the past.  · If you have hives, take a cool shower or use a cool compress to relieve itchiness.  SEEK MEDICAL CARE IF:  · You have a fever.  · Your rash is not going away.  · Your rash gets worse.  · Your rash  comes back.  · You have wheezing or coughing.  SEEK IMMEDIATE MEDICAL CARE IF:  · You start to have breathing problems.  · You start to have shortness of breath.  · You face or throat starts to swell.  · You have severe weakness with dizziness or fainting.  · You have chest pain.     This information is not intended to replace advice given to you by your health care provider. Make sure you discuss any questions you have with your health care provider.     Document Released: 01/25/2006 Document Revised: 01/08/2016 Document Reviewed: 10/14/2015  [employer] Interactive Patient Education ©2016 [employer]

## 2017-08-25 NOTE — ED AVS SNAPSHOT
Home Care Instructions                                                                                                                Lizz Saldana   MRN: [id number]    Department:  [facility], Emergency Dept   Date of Visit:  8/25/2017            [facility], Emergency Dept    [address] [facility]    [place] [address]    Phone:  [phone]      You were seen by     Vignesh Gregg M.D.      Your Diagnosis Was     Blistering rash     R21       These are the medications you received during your hospitalization from 08/25/2017 0922 to 08/25/2017 1115     Date/Time Order Dose Route Action    08/25/2017 1032 diphenhydrAMINE (BENADRYL) injection 12.5 mg 12.5 mg Intravenous Given    08/25/2017 1032 dexamethasone (DECADRON) injection 4 mg 4 mg Intravenous Given      Follow-up Information     1. Follow up with Zayda Soriano D.O. In 2 days.    Specialty:  Internal Medicine    Why:  As needed, If symptoms worsen    Contact information    [address]  [address]  [place] [address] [phone]        Medication Information     Review all of your home medications and newly ordered medications with your primary doctor and/or pharmacist as soon as possible. Follow medication instructions as directed by your doctor and/or pharmacist.     Please keep your complete medication list with you and share with your physician. Update the information when medications are discontinued, doses are changed, or new medications (including over-the-counter products) are added; and carry medication information at all times in the event of emergency situations.               Medication List      ASK your doctor about these medications        Instructions    Morning Afternoon Evening Bedtime    felodipine 5 MG Tb24   Commonly known as:  PLENDIL        Take 1 Tab by mouth every day. Take 1 tablet orally every day for blood pressure.   Dose:  5 mg                        gemfibrozil  600 MG Tabs   Commonly known as:  LOPID        Doctor's comments:  Please remind pt to get labs done   TAKE 1 TABLET TWICE A DAY                        lovastatin 20 MG Tabs   Commonly known as:  MEVACOR        Doctor's comments:  Please remind pt to get labs done   TAKE 1 TABLET DAILY                        Ocuvite PreserVision Tabs        Take  by mouth. Two tabs bid                        ONE-A-DAY WOMENS PO        Take  by mouth every day.                        Vitamin D3 2000 units Tabs        Take 2,000 Units by mouth every day.   Dose:  2000 Units                                Procedures and tests performed during your visit     CBC WITH DIFFERENTIAL    COMP METABOLIC PANEL    ESTIMATED GFR    SALINE LOCK        Discharge Instructions       Drug Rash  A drug rash is a change in the color or texture of the skin that is caused by a drug. It can develop minutes, hours, or days after the person takes the drug.  CAUSES  This condition is usually caused by a drug allergy. It can also be caused by exposure to sunlight after taking a drug that makes the skin sensitive to light. Drugs that commonly cause rashes include:  · Penicillin.  · Antibiotic medicines.  · Medicines that treat seizures.  · Medicines that treat cancer (chemotherapy).  · Aspirin and other nonsteroidal anti-inflammatory drugs (NSAIDs).  · Injectable dyes that contain iodine.  · Insulin.  SYMPTOMS  Symptoms of this condition include:  · Redness.  · Tiny bumps.  · Peeling.  · Itching.  · Itchy welts (hives).  · Swelling.  The rash may appear on a small area of skin or all over the body.  DIAGNOSIS  To diagnose the condition, your health care provider will do a physical exam. He or she may also order tests to find out which drug caused the rash. Tests to find the cause of a rash include:  · Skin tests.  · Blood tests.  · Drug challenge. For this test, you stop taking all of the drugs that you do not need to take, and then you start taking them again  by adding back one of the drugs at a time.  TREATMENT  A drug rash may be treated with medicines, including:  · Antihistamines. These may be given to relieve itching.  · An NSAID. This may be given to reduce swelling and treat pain.  · A steroid drug. This may be given to reduce swelling.  The rash usually goes away when the person stops taking the drug that caused it.  HOME CARE INSTRUCTIONS  · Take medicines only as directed by your health care provider.  · Let all of your health care providers know about any drug reactions you have had in the past.  · If you have hives, take a cool shower or use a cool compress to relieve itchiness.  SEEK MEDICAL CARE IF:  · You have a fever.  · Your rash is not going away.  · Your rash gets worse.  · Your rash comes back.  · You have wheezing or coughing.  SEEK IMMEDIATE MEDICAL CARE IF:  · You start to have breathing problems.  · You start to have shortness of breath.  · You face or throat starts to swell.  · You have severe weakness with dizziness or fainting.  · You have chest pain.     This information is not intended to replace advice given to you by your health care provider. Make sure you discuss any questions you have with your health care provider.     Document Released: 01/25/2006 Document Revised: 01/08/2016 Document Reviewed: 10/14/2015  Elsevier Interactive Patient Education ©2016 [employer]            Patient Information     Patient Information    Following emergency treatment: all patient requiring follow-up care must return either to a private physician or a clinic if your condition worsens before you are able to obtain further medical attention, please return to the emergency room.     Billing Information    At [facility], we work to make the billing process streamlined for our patients.  Our Representatives are here to answer any questions you may have regarding your hospital bill.  If you have insurance coverage and have supplied your insurance  information to us, we will submit a claim to your insurer on your behalf.  Should you have any questions regarding your bill, we can be reached online or by phone as follows:  Online: You are able pay your bills online or live chat with our representatives about any billing questions you may have. We are here to help Monday - Friday from 8:00am to 7:30pm and 9:00am - 12:00pm on Saturdays.  Please visit [email or web address]  for more information.   Phone:  [phone] or [phone]    Please note that your emergency physician, surgeon, pathologist, radiologist, anesthesiologist, and other specialists are not employed by [facility] and will therefore bill separately for their services.  Please contact them directly for any questions concerning their bills at the numbers below:     Emergency Physician Services:  [phone]  [facility]:  [phone]  [facility]:  [phone]  [facility]:  [phone]    1. Your final bill may vary from the amount quoted upon discharge if all procedures are not complete at that time, or if your doctor has additional procedures of which we are not aware. You will receive an additional bill if you return to the Emergency Department at [facility] for suture removal regardless of the facility of which the sutures were placed.     2. Please arrange for settlement of this account at the emergency registration.    3. All self-pay accounts are due in full at the time of treatment.  If you are unable to meet this obligation then payment is expected within 4-5 days.     4. If you have had radiology studies (CT, X-ray, Ultrasound, MRI), you have received a preliminary result during your emergency department visit. Please contact the radiology department [phone] to receive a copy of your final result. Please discuss the Final result with your primary physician or with the follow up physician  provided.     Crisis Hotline:  [place] Crisis Hotline:  9-261-XQWIHWL or 1-847.760.3509  [place] Crisis Hotline:    1-541.849.4504 or 176-816-7142         ED Discharge Follow Up Questions    1. In order to provide you with very good care, we would like to follow up with a phone call in the next few days.  May we have your permission to contact you?     YES /  NO    2. What is the best phone number to call you? (       )_____-__________    3. What is the best time to call you?      Morning  /  Afternoon  /  Evening                   Patient Signature:  ____________________________________________________________    Date:  ____________________________________________________________      Your appointments     Aug 30, 2017  3:20 PM   ANNUAL EXAM PREVENTATIVE with TREVA Jones   [facility]    [address] [place] [address], [address]  [place] [id number] [phone]

## 2017-08-25 NOTE — MR AVS SNAPSHOT
Lizz Saldana   2017 8:15 AM   Office Visit   MRN: [id number]    Department:  [facility]   Dept Phone:  [phone]    Description:  Female : 1927   Provider:  Kathi Kebede PA-C           Reason for Visit     Blisters L.leg blisters, L. leg swelling/ redeness, stiff neck, itchy chest X 4 days       Allergies as of 2017     Allergen Noted Reactions    Codeine 2009   Nausea    Floxin [Ofloxacin] 2013   Itching, Swelling    Lidoderm 2011       rash    Toradol 2009   Unspecified    .      You were diagnosed with     Stiff neck   [207333]       Blistered skin   [087060]         Vital Signs     Blood Pressure Pulse Temperature Respirations Height Weight    130/60 mmHg 80 36.7 °C (98 °F) 16 1.524 m (5') 45.813 kg (101 lb)    Body Mass Index Oxygen Saturation Smoking Status             19.73 kg/m2 98% Never Smoker          Basic Information     Date Of Birth Sex Race Ethnicity Preferred Language    1927 Female  Non- English      Your appointments     Aug 30, 2017  3:20 PM   ANNUAL EXAM PREVENTATIVE with TREVA Jones   [facility]    [address], [address]  [facility] [address] [phone]              Problem List              ICD-10-CM Priority Class Noted - Resolved    Hyperlipidemia E78.5   Unknown - Present    DJD (degenerative joint disease) M19.90   Unknown - Present    HX: breast cancer Z85.3   Unknown - Present    Tubular adenoma D36.9   Unknown - Present    Postherpetic neuralgia B02.29   Unknown - Present    Bladder incontinence R32   Unknown - Present    HTN (hypertension), benign I10   2009 - Present    Osteoporosis M81.0   2011 - Present    Macular degeneration H35.30   2012 - Present    Pigmented skin lesion L81.9   2015 - Present    Primary localized osteoarthrosis, lower leg M17.10   2015 - Present    Primary osteoarthritis of knee M17.10    10/12/2015 - Present    Calcific tendinitis of right shoulder M75.31   5/27/2016 - Present      Health Maintenance        Date Due Completion Dates    IMM ZOSTER VACCINE 8/4/1987 ---    IMM DTaP/Tdap/Td Vaccine (1 - Tdap) 7/29/2009 7/28/2009    IMM PNEUMOCOCCAL 65+ (ADULT) LOW/MEDIUM RISK SERIES (2 of 2 - PCV13) 11/8/2014 11/8/2013    MAMMOGRAM 10/30/2016 10/30/2015, 9/1/2008    IMM INFLUENZA (1) 9/1/2017 10/22/2015, 10/7/2014    BONE DENSITY 6/17/2021 6/17/2016, 10/16/2013, 3/25/2010, 7/10/2007, 7/1/2007    COLONOSCOPY 12/7/2022 12/7/2012 (Declined)    Override on 12/7/2012: Patient Declined            Current Immunizations     Influenza Vaccine Adult HD 10/22/2015, 10/7/2014    Pneumococcal polysaccharide vaccine (PPSV-23) 11/8/2013    TD Vaccine 7/28/2009      Below and/or attached are the medications your provider expects you to take. Review all of your home medications and newly ordered medications with your provider and/or pharmacist. Follow medication instructions as directed by your provider and/or pharmacist. Please keep your medication list with you and share with your provider. Update the information when medications are discontinued, doses are changed, or new medications (including over-the-counter products) are added; and carry medication information at all times in the event of emergency situations     Allergies:  CODEINE - Nausea     FLOXIN - Itching,Swelling     LIDODERM - (reactions not documented)     TORADOL - Unspecified               Medications  Valid as of: August 25, 2017 -  9:24 AM    Generic Name Brand Name Tablet Size Instructions for use    Cholecalciferol   Take 5,000 Units by mouth every day.        Denosumab   Inject  as instructed.        Felodipine (TABLET SR 24 HR) PLENDIL 5 MG Take 1 Tab by mouth every day. Take 1 tablet orally every day for blood pressure.        Gemfibrozil (Tab) LOPID 600 MG TAKE 1 TABLET TWICE A DAY        Lovastatin (Tab) MEVACOR 20 MG TAKE 1 TABLET DAILY          Multiple Vitamins-Calcium   Take  by mouth every day.        Multiple Vitamins-Minerals (Tab) Ocuvite PreserVision  Take  by mouth. Two tabs bid        .                 Medicines prescribed today were sent to:     [id number] [address] [place] [address]    Phone: [phone] Fax: [phone]    Open 24 Hours?: No      Medication refill instructions:       If your prescription bottle indicates you have medication refills left, it is not necessary to call your provider’s office. Please contact your pharmacy and they will refill your medication.    If your prescription bottle indicates you do not have any refills left, you may request refills at any time through one of the following ways: The online [employer] system (except Urgent Care), by calling your provider’s office, or by asking your pharmacy to contact your provider’s office with a refill request. Medication refills are processed only during regular business hours and may not be available until the next business day. Your provider may request additional information or to have a follow-up visit with you prior to refilling your medication.   *Please Note: Medication refills are assigned a new Rx number when refilled electronically. Your pharmacy may indicate that no refills were authorized even though a new prescription for the same medication is available at the pharmacy. Please request the medicine by name with the pharmacy before contacting your provider for a refill.           R[facility] Status: Patient Declined

## 2017-08-30 ENCOUNTER — OFFICE VISIT (OUTPATIENT)
Dept: MEDICAL GROUP | Facility: PHYSICIAN GROUP | Age: 82
End: 2017-08-30
Payer: MEDICARE

## 2017-08-30 ENCOUNTER — OFFICE VISIT (OUTPATIENT)
Dept: URGENT CARE | Facility: PHYSICIAN GROUP | Age: 82
End: 2017-08-30
Payer: MEDICARE

## 2017-08-30 VITALS
SYSTOLIC BLOOD PRESSURE: 126 MMHG | RESPIRATION RATE: 20 BRPM | BODY MASS INDEX: 20.03 KG/M2 | OXYGEN SATURATION: 98 % | HEART RATE: 73 BPM | HEIGHT: 60 IN | DIASTOLIC BLOOD PRESSURE: 74 MMHG | TEMPERATURE: 99 F | WEIGHT: 102 LBS

## 2017-08-30 VITALS
WEIGHT: 102 LBS | BODY MASS INDEX: 20.03 KG/M2 | OXYGEN SATURATION: 95 % | SYSTOLIC BLOOD PRESSURE: 130 MMHG | DIASTOLIC BLOOD PRESSURE: 76 MMHG | TEMPERATURE: 99.5 F | HEART RATE: 80 BPM | HEIGHT: 60 IN

## 2017-08-30 DIAGNOSIS — M75.31 CALCIFIC TENDINITIS OF RIGHT SHOULDER: ICD-10-CM

## 2017-08-30 DIAGNOSIS — Z85.3 HX: BREAST CANCER: ICD-10-CM

## 2017-08-30 DIAGNOSIS — E78.5 HYPERLIPIDEMIA, UNSPECIFIED HYPERLIPIDEMIA TYPE: ICD-10-CM

## 2017-08-30 DIAGNOSIS — H35.30 MACULAR DEGENERATION: ICD-10-CM

## 2017-08-30 DIAGNOSIS — I10 HTN (HYPERTENSION), BENIGN: ICD-10-CM

## 2017-08-30 DIAGNOSIS — M17.12 PRIMARY LOCALIZED OSTEOARTHROSIS, LOWER LEG, LEFT: ICD-10-CM

## 2017-08-30 DIAGNOSIS — B02.29 POSTHERPETIC NEURALGIA: ICD-10-CM

## 2017-08-30 DIAGNOSIS — L81.9 PIGMENTED SKIN LESION: ICD-10-CM

## 2017-08-30 DIAGNOSIS — M81.0 OSTEOPOROSIS, UNSPECIFIED OSTEOPOROSIS TYPE, UNSPECIFIED PATHOLOGICAL FRACTURE PRESENCE: ICD-10-CM

## 2017-08-30 DIAGNOSIS — M15.9 PRIMARY OSTEOARTHRITIS INVOLVING MULTIPLE JOINTS: ICD-10-CM

## 2017-08-30 DIAGNOSIS — Z00.00 MEDICARE ANNUAL WELLNESS VISIT, SUBSEQUENT: Primary | ICD-10-CM

## 2017-08-30 DIAGNOSIS — R32 URINARY INCONTINENCE, UNSPECIFIED TYPE: ICD-10-CM

## 2017-08-30 DIAGNOSIS — D36.9 TUBULAR ADENOMA: ICD-10-CM

## 2017-08-30 DIAGNOSIS — L03.119 CELLULITIS OF LOWER EXTREMITY, UNSPECIFIED LATERALITY: ICD-10-CM

## 2017-08-30 DIAGNOSIS — R21 RASH: ICD-10-CM

## 2017-08-30 PROCEDURE — G0439 PPPS, SUBSEQ VISIT: HCPCS | Performed by: NURSE PRACTITIONER

## 2017-08-30 PROCEDURE — 99214 OFFICE O/P EST MOD 30 MIN: CPT | Performed by: NURSE PRACTITIONER

## 2017-08-30 RX ORDER — DOXYCYCLINE HYCLATE 100 MG/1
100 CAPSULE ORAL 2 TIMES DAILY
Qty: 20 CAP | Refills: 0 | Status: SHIPPED | OUTPATIENT
Start: 2017-08-30 | End: 2017-09-13

## 2017-08-30 RX ORDER — HYDROCODONE BITARTRATE AND ACETAMINOPHEN 5; 325 MG/1; MG/1
1 TABLET ORAL EVERY 6 HOURS PRN
Qty: 15 TAB | Refills: 0 | Status: SHIPPED | OUTPATIENT
Start: 2017-08-30 | End: 2017-09-21

## 2017-08-30 ASSESSMENT — PATIENT HEALTH QUESTIONNAIRE - PHQ9: CLINICAL INTERPRETATION OF PHQ2 SCORE: 0

## 2017-08-30 NOTE — ASSESSMENT & PLAN NOTE
Chronic condition managed with current medical regimen  Stable per review, unchanged, stress incont intermi   Continue with surveillance  Followed by Zayda Soriano D.O..

## 2017-08-30 NOTE — ASSESSMENT & PLAN NOTE
Chronic condition managed with current medical regimen  Stable per review and Cont to have intermit, mild neuralgia to chest, site of shingles, 2nd episode   Continue with surveillance  Followed by No primary care provider on file..

## 2017-08-30 NOTE — ASSESSMENT & PLAN NOTE
Chronic condition managed with current medical regimen   Continue with current OTC meds  Followed by Zayda Soriano D.O..

## 2017-08-30 NOTE — PROGRESS NOTES
CC:   Medicare Annual Wellness Visit    HPI:  Lizz is a 89 y.o. here for Medicare Annual Wellness Visit    Patient Active Problem List    Diagnosis Date Noted   • Calcific tendinitis of right shoulder 05/27/2016   • Primary localized osteoarthrosis, lower leg 07/13/2015   • Pigmented skin lesion 04/30/2015   • Macular degeneration 09/07/2012   • Osteoporosis 09/09/2011   • HTN (hypertension), benign 12/17/2009   • Hyperlipidemia    • DJD (degenerative joint disease)    • HX: breast cancer    • Tubular adenoma    • Postherpetic neuralgia    • Bladder incontinence      Current Outpatient Prescriptions   Medication Sig Dispense Refill   • Cholecalciferol (VITAMIN D3) 2000 units Tab Take 2,000 Units by mouth every day.     • felodipine (PLENDIL) 5 MG TABLET SR 24 HR Take 1 Tab by mouth every day. Take 1 tablet orally every day for blood pressure. 90 Tab 2   • Multiple Vitamins-Calcium (ONE-A-DAY WOMENS PO) Take  by mouth every day.     • Multiple Vitamins-Minerals (OCUVITE PRESERVISION) TABS Take  by mouth. Two tabs bid 120 Tab 6   • lovastatin (MEVACOR) 20 MG Tab TAKE 1 TABLET DAILY 90 Tab 0   • gemfibrozil (LOPID) 600 MG Tab TAKE 1 TABLET TWICE A  Tab 0     No current facility-administered medications for this visit.       Current supplements: no  Chronic narcotic pain medicines: no  Allergies: Decadron [dexamethasone]; Codeine; Floxin [ofloxacin]; Lidoderm; and Toradol  Exercise: yes active  Current social contact/activities: Has support of family and friends      Screening:  Depression Screening    Little interest or pleasure in doing things?  0 - not at all  Feeling down, depressed , or hopeless? 0 - not at all  Patient Health Questionnaire Score: 0     If depressive symptoms identified deferred to follow up visit unless specifically addressed in assessment and plan.    Interpretation of PHQ-9 Total Score   Score Severity   1-4 No Depression   5-9 Mild Depression   10-14 Moderate Depression   15-19  Moderately Severe Depression   20-27 Severe Depression    Screening for Cognitive Impairment    Three Minute Recall (apple, watch, adalberto)  2/3    Draw clock face with all 12 numbers set to the hand to show 10 minutes past 11 o'clock  1 4  Cognitive concerns identified deferred for follow up unless specifically addressed in assessment and plan.    Fall Risk Assessment    Has the patient had two or more falls in the last year or any fall with injury in the last year?  No    Safety Assessment    Throw rugs on floor.  Yes  Handrails on all stairs.  Yes  Good lighting in all hallways.  Yes  Difficulty hearing.  No  Patient counseled about all safety risks that were identified.    Functional Assessment ADLs    Are there any barriers preventing you from cooking for yourself or meeting nutritional needs?  No.    Are there any barriers preventing you from driving safely or obtaining transportation?  No.    Are there any barriers preventing you from using a telephone or calling for help?  No.    Are there any barriers preventing you from shopping?  No.    Are there any barriers preventing you from taking care of your own finances?  No.    Are there any barriers preventing you from managing your medications?  No.    Are currently engaging any exercise or physical activity?  No.       Health Maintenance Summary                IMM ZOSTER VACCINE Overdue 8/4/1987     IMM DTaP/Tdap/Td Vaccine Overdue 7/29/2009      Done 7/28/2009 Imm Admin: TD Vaccine    IMM PNEUMOCOCCAL 65+ (ADULT) LOW/MEDIUM RISK SERIES Overdue 11/8/2014      Done 11/8/2013 Imm Admin: Pneumococcal polysaccharide vaccine (PPSV-23)    Annual Wellness Visit Overdue 4/8/2017      Done 4/7/2016 Visit Dx: Medicare annual wellness visit, initial     Patient has more history with this topic...    IMM INFLUENZA Next Due 9/1/2017      Done 10/22/2015 Imm Admin: Influenza Vaccine Adult HD     Patient has more history with this topic...          Patient Care Team:  Ashish  KATRINA Serrano M.D. (Orthopaedics)  Talon Nolasco M.D. (Ophthalmology)        Social History   Substance Use Topics   • Smoking status: Never Smoker   • Smokeless tobacco: Never Used   • Alcohol use No       Family History   Problem Relation Age of Onset   • Hypertension Mother    • Other Father    • Other Sister      alzheimer     She  has a past medical history of Arthritis (2015); Bilateral cataracts; Cancer ([facility]) (1990); DJD (degenerative joint disease); High cholesterol; HTN; breast cancer; Hyperlipidemia; Osteoporosis; Pain (7-2015); Pigmented skin lesion (4/30/2015); Postherpetic neuralgia; and Tubular adenoma. She also has no past medical history of Encounter for long-term (current) use of other medications.   Past Surgical History:   Procedure Laterality Date   • KNEE MANIPULATION Right 10/12/2015    Procedure: KNEE MANIPULATION;  Surgeon: Ashish Serrano M.D.;  Location: SURGERY [facility];  Service:    • KNEE ARTHROPLASTY TOTAL Right 7/13/2015    Procedure: KNEE ARTHROPLASTY TOTAL;  Surgeon: Ashish Serrano M.D.;  Location: SURGERY [facility];  Service:    • CATARACT EXTRACTION WITH IOL  2000    Bilateral   • ROTATOR CUFF REPAIR  1990    Left shoulder   • MASTECTOMY  1990    Left   • CYST EXCISION Left 1989    ganglion cyst removal, wrist   • HYSTERECTOMY, TOTAL ABDOMINAL  1970       ROS:    Ostomy or other tubes or amputations: no  Chronic oxygen use no  Last eye exam 2016  : Denies incontinence.   Gait: Uses no assistive device   Hearing excellent.    Dentition good    Exam: Blood pressure 130/76, pulse 80, temperature 37.5 °C (99.5 °F), height 1.524 m (5'), weight 46.3 kg (102 lb), SpO2 95 %. Body mass index is 19.92 kg/m².  Alert, oriented in no acute distress.  Eye contact is good, speech goal directed, affect calm      Assessment and Plan. The following treatment and monitoring plan is recommended for all problems as listed below:   Hyperlipidemia  Chronic condition managed  with current medical regimen  Labs reviewed    Continue with current meds  Followed by Zayda Soriano D.O..        DJD (degenerative joint disease)  Since R TKR with good results  L knee with intermit discomfort  Followed by ortho    Hx: Breast Cancer  Occurrence 1990s, mastectomy  Has had routine screening for L breast, neg to date  Followed by Zayda Soriano D.O..      Tubular Adenoma  Neg path  Has declined future colonoscopy    Postherpetic Neuralgia  Chronic condition managed with current medical regimen  Stable per review and Cont to have intermit, mild neuralgia to chest, site of shingles, 2nd episode   Continue with surveillance  Followed by No primary care provider on file..          Bladder Incontinence  Chronic condition managed with current medical regimen  Stable per review, unchanged, stress incont intermi   Continue with surveillance  Followed by Zayda Soriano D.O..        HTN (Hypertension), Benign  Chronic condition managed with current medical regimen  Stable per review   Continue with current meds  Followed by Zayda Soriano D.O..        Osteoporosis  Chronic condition managed with current medical regimen   Continue with current OTC meds  Followed by Zayda Soriano D.O..        Macular degeneration  Chronic condition managed with current medical regimen  Stable per review   Continue with surveillance  Followed by opth q 6 months       Pigmented skin lesion  Pt is unaware of this dx    Primary localized osteoarthrosis, lower leg  Chronic condition managed with current medical regimen  Stable per review   Continue with surveillance  Followed by Zayda Soriano D.O..        Calcific tendinitis of right shoulder  Chronic condition managed with current medical regimen  Stable per review   Continue with surveillance  Followed by Zayda Soriano D.O..        Primary osteoarthritis of knee  duplicate      Health Care Screening recommendations reviewed with patient today: per Patient  Instructions  DPA/Advanced directive: .has NO advanced directive - not interested in additional information    Next office visit for recheck of chronic medical conditions is due to establish with new pcp     Pt received a decadron injection which resulted in a severe reaction, initially rash, hives and currently very large fluid filled blisters to fernanda LE.  She was seen in ER.  At this time the blisters are extremely painful, has a 37.5 C temp elevation, general malaise.  She is directed to urgent care for evaluation

## 2017-08-30 NOTE — ASSESSMENT & PLAN NOTE
Chronic condition managed with current medical regimen  Stable per review   Continue with surveillance  Followed by opth q 6 months

## 2017-08-30 NOTE — ASSESSMENT & PLAN NOTE
Chronic condition managed with current medical regimen  Labs reviewed    Continue with current meds  Followed by Zayda Soriano D.O..

## 2017-08-30 NOTE — ASSESSMENT & PLAN NOTE
Chronic condition managed with current medical regimen  Stable per review   Continue with current meds  Followed by Zayda Soriano D.O..

## 2017-08-30 NOTE — PATIENT INSTRUCTIONS
Continue with care through No primary care provider on file..  Next Medicare Annual Wellness Visit is due in one year.    Continue care with specialists you are seeing for your chronic problems.    Attached is some preventative information for you to read.          Fall Prevention and Home Safety  Falls cause injuries and can affect all age groups. It is possible to prevent falls.   HOW TO PREVENT FALLS  · Wear shoes with rubber soles that do not have an opening for your toes.   · Keep the inside and outside of your house well lit.   · Use night lights throughout your home.   · Remove clutter from floors.   · Clean up floor spills.   · Remove throw rugs or fasten them to the floor with carpet tape.   · Do not place electrical cords across pathways.   · Put grab bars by your tub, shower, and toilet. Do not use towel bars as grab bars.   · Put handrails on both sides of the stairway. Fix loose handrails.   · Do not climb on stools or stepladders, if possible.   · Do not wax your floors.   · Repair uneven or unsafe sidewalks, walkways, or stairs.   · Keep items you use a lot within reach.   · Be aware of pets.   · Keep emergency numbers next to the telephone.   · Put smoke detectors in your home and near bedrooms.   Ask your doctor what other things you can do to prevent falls.  Document Released: 10/14/2010 Document Revised: 06/18/2013 Document Reviewed: 03/19/2013  ExitCare® Patient Information ©2013 [employer].    Exercise to Stay Healthy      Exercise helps you become and stay healthy.  EXERCISE IDEAS AND TIPS  Choose exercises that:  · You enjoy.   · Fit into your day.   You do not need to exercise really hard to be healthy. You can do exercises at a slow or medium level and stay healthy. You can:  · Stretch before and after working out.   · Try yoga, Pilates, or jayden chi.   · Lift weights.   · Walk fast, swim, jog, run, climb stairs, bicycle, dance, or rollerskate.   · Take aerobic classes.   Exercises that  burn about 150 calories:  · Running 1 ½ miles in 15 minutes.   · Playing volleyball for 45 to 60 minutes.   · Washing and waxing a car for 45 to 60 minutes.   · Playing touch football for 45 minutes.   · Walking 1 ¾ miles in 35 minutes.   · Pushing a stroller 1 ½ miles in 30 minutes.   · Playing basketball for 30 minutes.   · Raking leaves for 30 minutes.   · Bicycling 5 miles in 30 minutes.   · Walking 2 miles in 30 minutes.   · Dancing for 30 minutes.   · Shoveling snow for 15 minutes.   · Swimming laps for 20 minutes.   · Walking up stairs for 15 minutes.   · Bicycling 4 miles in 15 minutes.   · Gardening for 30 to 45 minutes.   · Jumping rope for 15 minutes.   · Washing windows or floors for 45 to 60 minutes.     One suggestion is to start walking for 10 minutes after each meal.  This will help with digestion and help you to metabolize your meal.       For Weight Loss -   Recommend portion sizes with more fruits/vegetables/high fiber foods.  Stay away from white bread/pastas/white rice/white potatoes.  Increase Fluid intake to 6-8 glasses of water daily.   Eliminate soda's (diet and regular) from your fluid intake.      Document Released: 01/20/2012 Document Revised: 03/11/2013 Document Reviewed: 01/20/2012  ExitCare® Patient Information ©2013 [employer], [employer].    Fat and Cholesterol Control Diet  Cholesterol is a wax-like substance. It comes from your liver and is found in certain foods. There is good (HDL) and bad (LDL) cholesterol. Too much cholesterol in your blood can affect your heart. Certain foods can lower or raise your cholesterol. Eat foods that are low in cholesterol.  Saturated and trans fats are bad fats found in foods that will raise your cholesterol. Do not eat foods that are high in saturated and trans fats.  FOODS HIGHER IN SATURATED AND TRANS FATS  · Dairy products, such as whole milk, eggs, cheese, cream, and butter.   · Fatty meats, such as hot dogs, sausage, and salami.   · Fried foods.    · Trans fats which are found in margarine and pre-made cookies, crackers, and baked goods.   · Tropical oils, such as coconut and palm oils.   Read package labels at the store. Do not buy products that use saturated or trans fats or hydrogenated oils. Find foods labeled:  · Low-fat.   · Low-saturated fat.   · Trans-fat-free.   · Low-cholesterol.   FOODS LOWER IN CHOLESTEROL  ·  Fruit.   · Vegetables.   · Beans, peas, and lentils.   · Fish.   · Lean meat, such as chicken (without skin) or ground turkey.   · Grains, such as barley, rice, couscous, bulgur wheat, and pasta.   · Heart-healthy tub margarine.   PREPARING YOUR FOOD  · Broil, bake, steam, or roast foods. Do not barrientos food.   · Use non-stick cooking sprays.   · Use lemon or herbs to flavor food instead of using butter or stick margarine.   · Use nonfat yogurt, salsa, or low-fat dressings for salads.   Document Released: 06/18/2013 Document Reviewed: 06/18/2013  ExitCare® Patient Information ©2013 [employer], [email or web address].    Recommend annual flu vaccine.  Next due in Fall, 2017.  If you decide not to have the flu vaccine, recommend good handwashing and staying out of crowds during flu season.

## 2017-08-30 NOTE — ASSESSMENT & PLAN NOTE
Chronic condition managed with current medical regimen  Stable per review   Continue with surveillance  Followed by Zayda Soriano D.O..

## 2017-08-30 NOTE — ASSESSMENT & PLAN NOTE
Occurrence 1990s, mastectomy  Has had routine screening for L breast, neg to date  Followed by Zayda Soriano D.O..

## 2017-08-31 ASSESSMENT — ENCOUNTER SYMPTOMS
HEADACHES: 0
VOMITING: 0
CHILLS: 0
MYALGIAS: 0
FEVER: 0
NAUSEA: 0

## 2017-08-31 NOTE — PROGRESS NOTES
Subjective:      Lizz Saldana is a 89 y.o. female who presents with Blister (C/o blister on LT ankle x5 days. Possible reaction to medication she received on 8/25/17.  Painful, possibly getting better.)            Pt was given a solu medrol injection for neck pain she returned the next day with redness, swelling and vesicles to her lower legs bilaterally. The pt was sent to ED and dx with allergic reaction she was treated with decadron and anti histamine with out improvement. Pt c/o severe pain, she denies fevers or chills.         Review of Systems   Constitutional: Negative for chills and fever.   Gastrointestinal: Negative for nausea and vomiting.   Musculoskeletal: Negative for joint pain and myalgias.   Skin: Positive for rash. Negative for itching.   Neurological: Negative for headaches.          Objective:     /74   Pulse 73   Temp 37.2 °C (99 °F)   Resp 20   Ht 1.524 m (5')   Wt 46.3 kg (102 lb)   SpO2 98%   BMI 19.92 kg/m²      Physical Exam   Constitutional: She appears well-developed and well-nourished. No distress.   HENT:   Head: Normocephalic and atraumatic.   Eyes: Conjunctivae are normal. Pupils are equal, round, and reactive to light.   Neck: Neck supple.   Cardiovascular: Normal rate.    Pulmonary/Chest: Effort normal. No respiratory distress.   Neurological: She is alert.   Awake, alert, answering questions appropriately, moving all extremeties   Skin: Skin is warm and dry. Rash noted. Rash is vesicular. There is erythema.        Pt has erythema and mild swelling to bilateral lower distal ext. With several large vesicles on each leg, no purulent drainage, no proximal streaking. No pustules. The erythema and swelling do not extend into the feet.    Psychiatric: She has a normal mood and affect. Her behavior is normal.               Assessment/Plan:     1. Cellulitis of lower extremity, unspecified laterality  doxycycline (VIBRAMYCIN) 100 MG Cap    hydrocodone-acetaminophen (NORCO)  5-325 MG Tab per tablet   2. Rash  doxycycline (VIBRAMYCIN) 100 MG Cap    hydrocodone-acetaminophen (NORCO) 5-325 MG Tab per tablet       Do not drink alcohol or operate machinery with this medication, pt / family warned of increase fall risk with this medication.   Pt reviewed on [place]  Aware,  no remarkable controlled substance prescription documentation noted        Rest, elevation, ibuprofen  Pt will go to the ER for worsening or changing symptoms as discussed: increased redness, proximal streaking, high fevers/chills or any other concerns,   Follow-up with your primary care provider or return here 2 days for recheck  Discharge instructions discussed with pt/family who verbalize understanding and agreement with poc

## 2017-09-05 ENCOUNTER — OFFICE VISIT (OUTPATIENT)
Dept: URGENT CARE | Facility: PHYSICIAN GROUP | Age: 82
End: 2017-09-05
Payer: MEDICARE

## 2017-09-05 VITALS
RESPIRATION RATE: 16 BRPM | BODY MASS INDEX: 20.03 KG/M2 | HEIGHT: 60 IN | TEMPERATURE: 98.4 F | OXYGEN SATURATION: 93 % | HEART RATE: 90 BPM | SYSTOLIC BLOOD PRESSURE: 144 MMHG | WEIGHT: 102 LBS | DIASTOLIC BLOOD PRESSURE: 80 MMHG

## 2017-09-05 DIAGNOSIS — R23.8 VESICLES: ICD-10-CM

## 2017-09-05 DIAGNOSIS — L03.119 CELLULITIS OF LOWER EXTREMITY, UNSPECIFIED LATERALITY: Primary | ICD-10-CM

## 2017-09-05 PROCEDURE — 99214 OFFICE O/P EST MOD 30 MIN: CPT | Performed by: NURSE PRACTITIONER

## 2017-09-05 ASSESSMENT — ENCOUNTER SYMPTOMS
VOMITING: 0
NAUSEA: 0
CHILLS: 0
HEADACHES: 0
MYALGIAS: 0
FEVER: 0

## 2017-09-05 NOTE — PROGRESS NOTES
Subjective:      Lizz Saldana is a 89 y.o. female who presents with Blister (C/o fatigue, bilateral leg blisters that broke/popped, swelling around leg ongoing issue.)            Medications, Allergies and Prior Medical Hx reviewed and updated in Psychiatric.with patient/family today     Patient is here for her third visit in urgent care and +1 in the emergency department for erythema and vesicles on her bilateral lower legs. This started approximately 3 weeks ago after being given some steroids. I am. On her most recent visit, she was given doxycycline by mouth with improvement of her erythema bilaterally. The vesicles have ruptured and drained. Patient continues to have pain, she did denies any fevers or chills.      Blister   This is a new problem. Pertinent negatives include no chills, fever, headaches, myalgias, nausea or vomiting.       Review of Systems   Constitutional: Negative for chills and fever.   Gastrointestinal: Negative for nausea and vomiting.   Musculoskeletal: Negative for myalgias.   Neurological: Negative for headaches.          Objective:     /80   Pulse 90   Temp 36.9 °C (98.4 °F)   Resp 16   Ht 1.524 m (5')   Wt 46.3 kg (102 lb)   SpO2 93%   BMI 19.92 kg/m²      Physical Exam   Constitutional: She appears well-developed and well-nourished. No distress.   HENT:   Head: Normocephalic and atraumatic.   Eyes: Conjunctivae are normal. Pupils are equal, round, and reactive to light.   Neck: Neck supple.   Cardiovascular: Normal rate.    Pulmonary/Chest: Effort normal. No respiratory distress.   Musculoskeletal:        Right lower leg: She exhibits tenderness and swelling. She exhibits no bony tenderness.        Legs:  Right lower leg has a ruptured vesicle with swelling and erythema. Surrounding the vesicle. There is no purulent drainage. There is no proximal streaking. Patient has +3 dorsalis pedal pulse. CSM to her toes is intact.    Left lower leg has 2 ruptured vesicles. The erythema has  almost completely resolved. The swelling has also greatly resolved. There is no proximal streaking. Patient has +2 dorsalis pedal pulse. CSM distally was intact.   Neurological: She is alert.   Awake, alert, answering questions appropriately, moving all extremeties   Skin: Skin is warm and dry. No rash noted.   Psychiatric: She has a normal mood and affect. Her behavior is normal.   Vitals reviewed.              Assessment/Plan:       1. Cellulitis of lower extremity, unspecified laterality  mupirocin (BACTROBAN) 2 % Ointment   2. Vesicles  mupirocin (BACTROBAN) 2 % Ointment       The pt has 2 days of doxycycline remaining she will continue that. Will at bactroban to the right leg. An appt was made with her new pcp for 9/13 the next available.     Pt will go to the ER for worsening or changing symptoms as discussed,   Follow-up with pcp as scheduled  Return to UC for any problems or concerns  Discharge instructions discussed with pt/family who verbalize understanding and agreement with poc

## 2017-09-13 ENCOUNTER — OFFICE VISIT (OUTPATIENT)
Dept: MEDICAL GROUP | Facility: PHYSICIAN GROUP | Age: 82
End: 2017-09-13
Payer: MEDICARE

## 2017-09-13 VITALS
OXYGEN SATURATION: 94 % | TEMPERATURE: 98.4 F | HEIGHT: 60 IN | DIASTOLIC BLOOD PRESSURE: 78 MMHG | HEART RATE: 88 BPM | WEIGHT: 101 LBS | SYSTOLIC BLOOD PRESSURE: 136 MMHG | RESPIRATION RATE: 16 BRPM | BODY MASS INDEX: 19.83 KG/M2

## 2017-09-13 DIAGNOSIS — L03.115 CELLULITIS OF BOTH LOWER EXTREMITIES: ICD-10-CM

## 2017-09-13 DIAGNOSIS — L03.116 CELLULITIS OF BOTH LOWER EXTREMITIES: ICD-10-CM

## 2017-09-13 PROCEDURE — 99215 OFFICE O/P EST HI 40 MIN: CPT | Performed by: NURSE PRACTITIONER

## 2017-09-13 NOTE — PROGRESS NOTES
Subjective:     Chief Complaint   Patient presents with   • Skin Lesion   • Rash     Lizz Saldana is a 89 y.o. female patient of my colleague  here today with her  for evaluation and management of issues listed below.    Cellulitis: She had neck pain and was given Methylprednisolone injection at , blistering rash developed on LE and erythematous rash on chest. Very painful. Returned to  and sent to ED decadron and benadryl IV but no improvement. Referred to  when she came for AWV. Continues medications as prescribed, reports it is much improved, swelling almost resolved. no severe pain.  1. Cellulitis of both lower extremities        ROS   Denies HA, chest pain, shortness of breath, abdominal pain, bladder or bowel changes, lower extremity edema. All other pertinent systems reviewed and are negative except as in HPI.    Allergies:  Codeine; Floxin [ofloxacin]; Lidoderm; and Toradol  Current medicines (including changes today)  Current Outpatient Prescriptions   Medication Sig Dispense Refill   • Cholecalciferol (VITAMIN D3) 2000 units Tab Take 2,000 Units by mouth every day.     • lovastatin (MEVACOR) 20 MG Tab TAKE 1 TABLET DAILY 90 Tab 0   • gemfibrozil (LOPID) 600 MG Tab TAKE 1 TABLET TWICE A  Tab 0   • felodipine (PLENDIL) 5 MG TABLET SR 24 HR Take 1 Tab by mouth every day. Take 1 tablet orally every day for blood pressure. 90 Tab 2   • Multiple Vitamins-Calcium (ONE-A-DAY WOMENS PO) Take  by mouth every day.     • Multiple Vitamins-Minerals (OCUVITE PRESERVISION) TABS Take  by mouth. Two tabs bid 120 Tab 6   • mupirocin (BACTROBAN) 2 % Ointment Apply to right leg wound twice a day 1 Tube 0   • hydrocodone-acetaminophen (NORCO) 5-325 MG Tab per tablet Take 1 Tab by mouth every 6 hours as needed. 15 Tab 0     No current facility-administered medications for this visit.        She  has a past medical history of Arthritis (2015); Bilateral cataracts; Cancer (CMS-HCC) (1990); DJD (degenerative  joint disease); High cholesterol; HTN; breast cancer; Hyperlipidemia; Osteoporosis; Pain (7-2015); Pigmented skin lesion (4/30/2015); Postherpetic neuralgia; and Tubular adenoma. She also has no past medical history of Encounter for long-term (current) use of other medications.       Objective:   Blood pressure 136/78, pulse 88, temperature 36.9 °C (98.4 °F), resp. rate 16, height 1.524 m (5'), weight 45.8 kg (101 lb), SpO2 94 %. Body mass index is 19.73 kg/m².  Physical Exam   Alert, no acute distress. Pleasant and cooperative with the examination.  Eye contact is good, affect calm.  Skin: Warm, dry, crusted lesions present on b/l le, no erythema or warmth.     Eyes: Pupils equal, round. Conjunctiva and sclera clear, lids normal.  ENT: Pinna normal.  Neck: Supple, trachea midline.  Lungs: Normal effort and respirations. CV: Regular rate and rhythm.  MS/Ext: Steady gait, no edema.    Results reviewed  Allergy list, ED and UC visit, consulted with UC provider treating pt most recently and APRN who examined pt prior to recent UC visit.    Assessment and Plan:   Treatment:   Lizz was seen today for skin lesion and rash.    Diagnoses and all orders for this visit:    Cellulitis of both lower extremities    Improving, continue current medications and monitoring. Differential diagnosis, natural history, treatment options, supportive care, and indications for immediate follow-up discussed at length.     ALLERGY LIST CORRECTED IN EPIC AND WITH FAMILY     Followup: Return in about 1 week (around 9/20/2017). Sooner should new symptoms or problems arise, or as previously scheduled.           Reviewed side effects, risks, and benefits of medications prescribed today.  Advised to take all medications as instructed and report any side effects.   The patient voices understanding and agrees.  Report any new or worsening symptoms.  Have labs or other diagnostic studies prior to follow up.  Keep all appointments for any referrals  given.      40 minute visit with this patient and greater than 50% of this time involved face to face review, consultation, counseling, education, and arranging future evaluation and treatment of medical conditions and care. Questions were answered regarding her diagnoses in regards to current management, specialty providers, symptom control, and future planning.      Please note this dictation was created using voice recognition software. Every reasonable attempt has been made to correct obvious errors, however there may be errors of grammar and possibly content that were not discovered before finalizing the note.

## 2017-09-21 ENCOUNTER — OFFICE VISIT (OUTPATIENT)
Dept: MEDICAL GROUP | Facility: PHYSICIAN GROUP | Age: 82
End: 2017-09-21
Payer: MEDICARE

## 2017-09-21 VITALS
TEMPERATURE: 98 F | OXYGEN SATURATION: 97 % | HEIGHT: 60 IN | DIASTOLIC BLOOD PRESSURE: 72 MMHG | BODY MASS INDEX: 20.03 KG/M2 | HEART RATE: 82 BPM | SYSTOLIC BLOOD PRESSURE: 124 MMHG | RESPIRATION RATE: 16 BRPM | WEIGHT: 102 LBS

## 2017-09-21 DIAGNOSIS — E78.5 DYSLIPIDEMIA: ICD-10-CM

## 2017-09-21 DIAGNOSIS — L03.115 CELLULITIS OF BOTH LOWER EXTREMITIES: ICD-10-CM

## 2017-09-21 DIAGNOSIS — M81.0 OSTEOPOROSIS, UNSPECIFIED OSTEOPOROSIS TYPE, UNSPECIFIED PATHOLOGICAL FRACTURE PRESENCE: ICD-10-CM

## 2017-09-21 DIAGNOSIS — Z76.89 ENCOUNTER TO ESTABLISH CARE: ICD-10-CM

## 2017-09-21 DIAGNOSIS — L03.116 CELLULITIS OF BOTH LOWER EXTREMITIES: ICD-10-CM

## 2017-09-21 DIAGNOSIS — I10 HTN (HYPERTENSION), BENIGN: ICD-10-CM

## 2017-09-21 PROCEDURE — 99214 OFFICE O/P EST MOD 30 MIN: CPT | Performed by: NURSE PRACTITIONER

## 2017-09-24 NOTE — PROGRESS NOTES
Chief Complaint   Patient presents with   • Follow-Up     Cellulitis      Lizz Saldana is a 89 y.o. female here today with her  for evaluation and management of the following:  HPI:    Previous PCP Zayda Soriano    Recheck cellulitis - healing well, nearly resolved. Has been moisturizing skin around the lesions. Some itching.    She would also like to establish care       HTN (hypertension), benign  Managed with felodipine 5 mg daily.    Dyslipidemia  Managed with lovastatin 20 mg and gemfibrozil 600 mg daily.    Osteoporosis  DEXA 2013  L-spine T score of -1.7   left femur  T score of -2.4  denosumab 1.28.15 after approx 5 yrs oral treatment with fosamax per review of EPIC, pt does not recall.      ROS   Denies HA, chest pain, shortness of breath, abdominal pain, bladder or bowel changes,  All other pertinent systems reviewed and are negative except as in HPI.    Current medicines (including changes today)  Current Outpatient Prescriptions   Medication Sig Dispense Refill   • Cholecalciferol (VITAMIN D3) 2000 units Tab Take 2,000 Units by mouth every day.     • lovastatin (MEVACOR) 20 MG Tab TAKE 1 TABLET DAILY 90 Tab 0   • gemfibrozil (LOPID) 600 MG Tab TAKE 1 TABLET TWICE A  Tab 0   • felodipine (PLENDIL) 5 MG TABLET SR 24 HR Take 1 Tab by mouth every day. Take 1 tablet orally every day for blood pressure. 90 Tab 2   • Multiple Vitamins-Calcium (ONE-A-DAY WOMENS PO) Take  by mouth every day.       No current facility-administered medications for this visit.      She  has a past medical history of Arthritis (2015); Bilateral cataracts; Cancer (CMS-HCC) (1990); DJD (degenerative joint disease); High cholesterol; HTN; breast cancer; Hyperlipidemia; Osteoporosis; Pain (7-2015); Pigmented skin lesion (4/30/2015); Postherpetic neuralgia; and Tubular adenoma. She also has no past medical history of Encounter for long-term (current) use of other medications.    Current medications, problem list, allergies,  past medical history, and tobacco use history reviewed in [facility] today.    Health maintenance reviewed and updated.     Objective:   Blood pressure 124/72, pulse 82, temperature 36.7 °C (98 °F), resp. rate 16, height 1.524 m (5'), weight 46.3 kg (102 lb), SpO2 97 %. Body mass index is 19.92 kg/m².  Physical Exam:  Constitutional: Alert, oriented, in no acute distress.  Pleasant and cooperative with the examination.  Psych: Eye contact is good, speech goal directed, affect calm, normal judgement and insight.  Skin: Warm, dry, no rashes in visible areas.  HEENT: PER, conjunctiva and sclera clear.  Nares patent with no significant congestion or drainage.  Normal pinnae Neck: Supple, trachea midline.   Lungs: Normal effort and respirations. Clear to auscultation bilaterally.  CV: Regular rate and rhythm.  Lower extremities: mild edema, well healing lesions, small crust remains on rle, pulses intact.  Neurological:  Grossly intact.        Assessment and Plan:   The following treatment plan was discussed  1. HTN (hypertension), benign  Stable. Continue current medicines. Monitor labs regularly.        2. Dyslipidemia  Stable. Continue current medicines. Monitor labs regularly.        3. Cellulitis of both lower extremities  Resolving, reviewed s/sx of need for evaluation.    4. Osteoporosis  Completed at least 5 yrs treatment. Will monitor.       5. Encounter to establish care    Records reviewed and/or requested.   Followup: Return in about 6 months (around 3/21/2018) for multiple chronic problems.  Sooner if needed or with any new problems.         Take all medications as directed. Report any side effects of medications.   Report any new symptoms.  Follow up as advised.  Have labs or other studies as ordered done as directed prior to follow up.  Please keep all appointments for any referrals given.    Please note this dictation was created using voice recognition software. Every reasonable attempt has been made to correct  obvious errors, however there may be errors of grammar and possibly content that were not discovered before finalizing the note.

## 2017-09-24 NOTE — ASSESSMENT & PLAN NOTE
DEXA 2013  L-spine T score of -1.7   left femur  T score of -2.4  denosumab 1.28.15 after approx 5 yrs oral treatment with fosamax per review of EPIC, pt does not recall.

## 2017-10-07 DIAGNOSIS — I10 ESSENTIAL HYPERTENSION: ICD-10-CM

## 2017-10-09 RX ORDER — FELODIPINE 5 MG/1
TABLET, EXTENDED RELEASE ORAL
Qty: 90 TAB | Refills: 1 | Status: SHIPPED | OUTPATIENT
Start: 2017-10-09 | End: 2018-03-30 | Stop reason: SDUPTHER

## 2017-10-09 NOTE — TELEPHONE ENCOUNTER
Refill X 6 months, sent to pharmacy.Pt. Seen in the last 6 months per protocol.   Lab Results   Component Value Date/Time    SODIUM 140 08/25/2017 10:11 AM    POTASSIUM 3.2 (L) 08/25/2017 10:11 AM    CHLORIDE 104 08/25/2017 10:11 AM    CO2 24 08/25/2017 10:11 AM    GLUCOSE 89 08/25/2017 10:11 AM    BUN 15 08/25/2017 10:11 AM    CREATININE 0.63 08/25/2017 10:11 AM    CREATININE 0.81 02/17/2010 10:19 AM    BUNCREATRAT 23 02/17/2010 10:19 AM    GLOMRATE >59 02/17/2010 10:19 AM

## 2017-10-22 ENCOUNTER — SUPERVISING PHYSICIAN REVIEW (OUTPATIENT)
Dept: MEDICAL GROUP | Facility: PHYSICIAN GROUP | Age: 82
End: 2017-10-22

## 2017-10-23 NOTE — PROGRESS NOTES
I have reviewed and agree with history, assessment and plan for office encounter on 9/13/17 with midlevel provider: Ceci Ervin  Face to face encounter/direct observation: No  Suggested changes to plan or follow-up: None  Niki Wilson D.O.

## 2017-11-07 ENCOUNTER — NON-PROVIDER VISIT (OUTPATIENT)
Dept: URGENT CARE | Facility: PHYSICIAN GROUP | Age: 82
End: 2017-11-07
Payer: MEDICARE

## 2017-11-07 DIAGNOSIS — Z23 NEED FOR INFLUENZA VACCINATION: ICD-10-CM

## 2017-11-07 PROCEDURE — 90662 IIV NO PRSV INCREASED AG IM: CPT | Performed by: PHYSICIAN ASSISTANT

## 2017-11-07 PROCEDURE — G0008 ADMIN INFLUENZA VIRUS VAC: HCPCS | Performed by: PHYSICIAN ASSISTANT

## 2017-11-08 ENCOUNTER — TELEPHONE (OUTPATIENT)
Dept: MEDICAL GROUP | Facility: PHYSICIAN GROUP | Age: 82
End: 2017-11-08

## 2018-01-22 ENCOUNTER — TELEPHONE (OUTPATIENT)
Dept: MEDICAL GROUP | Facility: PHYSICIAN GROUP | Age: 83
End: 2018-01-22

## 2018-01-22 DIAGNOSIS — I10 HTN (HYPERTENSION), BENIGN: ICD-10-CM

## 2018-01-22 DIAGNOSIS — E78.5 DYSLIPIDEMIA: ICD-10-CM

## 2018-01-23 RX ORDER — LOVASTATIN 20 MG/1
TABLET ORAL
Qty: 90 TAB | Refills: 0 | Status: SHIPPED | OUTPATIENT
Start: 2018-01-23 | End: 2018-04-06 | Stop reason: SDUPTHER

## 2018-01-23 NOTE — TELEPHONE ENCOUNTER
Was the patient seen in the last year in this department? Yes     Does patient have an active prescription for medications requested? No     Received Request Via: Pharmacy      Pt met protocol?:PT NEEDS TO UPDATE LABS    LAST OV 09/21/2017    BP Readings from Last 1 Encounters:   09/21/17 124/72     Lab Results   Component Value Date/Time    CHOLSTRLTOT 196 06/01/2016 07:14 AM     (H) 06/01/2016 07:14 AM    HDL 79 06/01/2016 07:14 AM    TRIGLYCERIDE 44 06/01/2016 07:14 AM       Lab Results   Component Value Date/Time    SODIUM 140 08/25/2017 10:11 AM    POTASSIUM 3.2 (L) 08/25/2017 10:11 AM    CHLORIDE 104 08/25/2017 10:11 AM    CO2 24 08/25/2017 10:11 AM    GLUCOSE 89 08/25/2017 10:11 AM    BUN 15 08/25/2017 10:11 AM    CREATININE 0.63 08/25/2017 10:11 AM    CREATININE 0.81 02/17/2010 10:19 AM    BUNCREATRAT 23 02/17/2010 10:19 AM    GLOMRATE >59 02/17/2010 10:19 AM     Lab Results   Component Value Date/Time    ALKPHOSPHAT 127 (H) 08/25/2017 10:11 AM    ASTSGOT 22 08/25/2017 10:11 AM    ALTSGPT 17 08/25/2017 10:11 AM    TBILIRUBIN 1.1 08/25/2017 10:11 AM      No results found for: HBA1C  No results found for: AVGLUC    Lab Results  Component Value Date/Time   CHOLSTRLTOT 196 06/01/2016 0714       Lab Results  Component Value Date/Time   TRIGLYCERIDE 44 06/01/2016 0714       Lab Results  Component Value Date/Time   HDL 79 06/01/2016 0714       Lab Results  Component Value Date/Time    (H) 06/01/2016 0714

## 2018-01-24 ENCOUNTER — HOSPITAL ENCOUNTER (OUTPATIENT)
Dept: LAB | Facility: MEDICAL CENTER | Age: 83
End: 2018-01-24
Attending: INTERNAL MEDICINE
Payer: MEDICARE

## 2018-01-24 DIAGNOSIS — E78.5 DYSLIPIDEMIA: ICD-10-CM

## 2018-01-24 DIAGNOSIS — I10 HTN (HYPERTENSION), BENIGN: ICD-10-CM

## 2018-01-24 LAB
25(OH)D3 SERPL-MCNC: 46 NG/ML (ref 30–100)
ERYTHROCYTE [DISTWIDTH] IN BLOOD BY AUTOMATED COUNT: 46.2 FL (ref 35.9–50)
HCT VFR BLD AUTO: 43.4 % (ref 37–47)
HGB BLD-MCNC: 13.9 G/DL (ref 12–16)
MCH RBC QN AUTO: 29.4 PG (ref 27–33)
MCHC RBC AUTO-ENTMCNC: 32 G/DL (ref 33.6–35)
MCV RBC AUTO: 91.8 FL (ref 81.4–97.8)
PLATELET # BLD AUTO: 352 K/UL (ref 164–446)
PMV BLD AUTO: 9.9 FL (ref 9–12.9)
RBC # BLD AUTO: 4.73 M/UL (ref 4.2–5.4)
WBC # BLD AUTO: 7 K/UL (ref 4.8–10.8)

## 2018-01-24 PROCEDURE — 82306 VITAMIN D 25 HYDROXY: CPT

## 2018-01-24 PROCEDURE — 80061 LIPID PANEL: CPT

## 2018-01-24 PROCEDURE — 85027 COMPLETE CBC AUTOMATED: CPT

## 2018-01-24 PROCEDURE — 80053 COMPREHEN METABOLIC PANEL: CPT

## 2018-01-24 PROCEDURE — 36415 COLL VENOUS BLD VENIPUNCTURE: CPT

## 2018-01-25 LAB
ALBUMIN SERPL BCP-MCNC: 4.1 G/DL (ref 3.2–4.9)
ALBUMIN/GLOB SERPL: 1.3 G/DL
ALP SERPL-CCNC: 99 U/L (ref 30–99)
ALT SERPL-CCNC: 19 U/L (ref 2–50)
ANION GAP SERPL CALC-SCNC: 13 MMOL/L (ref 0–11.9)
AST SERPL-CCNC: 34 U/L (ref 12–45)
BILIRUB SERPL-MCNC: 1.5 MG/DL (ref 0.1–1.5)
BUN SERPL-MCNC: 14 MG/DL (ref 8–22)
CALCIUM SERPL-MCNC: 9.4 MG/DL (ref 8.4–10.2)
CHLORIDE SERPL-SCNC: 103 MMOL/L (ref 96–112)
CHOLEST SERPL-MCNC: 216 MG/DL (ref 100–199)
CO2 SERPL-SCNC: 24 MMOL/L (ref 20–33)
CREAT SERPL-MCNC: 0.63 MG/DL (ref 0.5–1.4)
GLOBULIN SER CALC-MCNC: 3.1 G/DL (ref 1.9–3.5)
GLUCOSE SERPL-MCNC: 82 MG/DL (ref 65–99)
HDLC SERPL-MCNC: 82 MG/DL
LDLC SERPL CALC-MCNC: 122 MG/DL
POTASSIUM SERPL-SCNC: 3.7 MMOL/L (ref 3.6–5.5)
PROT SERPL-MCNC: 7.2 G/DL (ref 6–8.2)
SODIUM SERPL-SCNC: 140 MMOL/L (ref 135–145)
TRIGL SERPL-MCNC: 61 MG/DL (ref 0–149)

## 2018-01-30 RX ORDER — GEMFIBROZIL 600 MG/1
600 TABLET, FILM COATED ORAL 2 TIMES DAILY
Qty: 180 TAB | Refills: 1 | Status: SHIPPED | OUTPATIENT
Start: 2018-01-30 | End: 2018-07-30 | Stop reason: SDUPTHER

## 2018-01-30 NOTE — TELEPHONE ENCOUNTER
Was the patient seen in the last year in this department? Yes     Does patient have an active prescription for medications requested? No     Received Request Via: Pharmacy      Pt met protocol?: Yes, OV 9/17   Lab Results   Component Value Date/Time    CHOLSTRLTOT 216 (H) 01/24/2018 09:37 AM     (H) 01/24/2018 09:37 AM    HDL 82 01/24/2018 09:37 AM    TRIGLYCERIDE 61 01/24/2018 09:37 AM       Lab Results   Component Value Date/Time    SODIUM 140 01/24/2018 09:37 AM    POTASSIUM 3.7 01/24/2018 09:37 AM    CHLORIDE 103 01/24/2018 09:37 AM    CO2 24 01/24/2018 09:37 AM    GLUCOSE 82 01/24/2018 09:37 AM    BUN 14 01/24/2018 09:37 AM    CREATININE 0.63 01/24/2018 09:37 AM    CREATININE 0.81 02/17/2010 10:19 AM    BUNCREATRAT 23 02/17/2010 10:19 AM    GLOMRATE >59 02/17/2010 10:19 AM     Lab Results   Component Value Date/Time    ALKPHOSPHAT 99 01/24/2018 09:37 AM    ASTSGOT 34 01/24/2018 09:37 AM    ALTSGPT 19 01/24/2018 09:37 AM    TBILIRUBIN 1.5 01/24/2018 09:37 AM

## 2018-03-30 DIAGNOSIS — I10 ESSENTIAL HYPERTENSION: ICD-10-CM

## 2018-03-30 RX ORDER — FELODIPINE 5 MG/1
TABLET, EXTENDED RELEASE ORAL
Qty: 90 TAB | Refills: 1 | Status: SHIPPED | OUTPATIENT
Start: 2018-03-30 | End: 2018-09-04 | Stop reason: SDUPTHER

## 2018-03-30 NOTE — TELEPHONE ENCOUNTER
Was the patient seen in the last year in this department? Yes     Does patient have an active prescription for medications requested? No     Received Request Via: Pharmacy      Pt met protocol?: Yes    OV 9/17    BP Readings from Last 1 Encounters:   09/21/17 124/72

## 2018-04-06 RX ORDER — LOVASTATIN 20 MG/1
TABLET ORAL
Qty: 90 TAB | Refills: 1 | Status: SHIPPED | OUTPATIENT
Start: 2018-04-06 | End: 2018-10-03 | Stop reason: SDUPTHER

## 2018-04-06 NOTE — TELEPHONE ENCOUNTER
Pt has had OV within the 12 month protocol and lipid panel is current. 6 month supply sent to pharmacy.   Lab Results   Component Value Date/Time    CHOLSTRLTOT 216 (H) 01/24/2018 09:37 AM     (H) 01/24/2018 09:37 AM    HDL 82 01/24/2018 09:37 AM    TRIGLYCERIDE 61 01/24/2018 09:37 AM       Lab Results   Component Value Date/Time    SODIUM 140 01/24/2018 09:37 AM    POTASSIUM 3.7 01/24/2018 09:37 AM    CHLORIDE 103 01/24/2018 09:37 AM    CO2 24 01/24/2018 09:37 AM    GLUCOSE 82 01/24/2018 09:37 AM    BUN 14 01/24/2018 09:37 AM    CREATININE 0.63 01/24/2018 09:37 AM    CREATININE 0.81 02/17/2010 10:19 AM    BUNCREATRAT 23 02/17/2010 10:19 AM    GLOMRATE >59 02/17/2010 10:19 AM     Lab Results   Component Value Date/Time    ALKPHOSPHAT 99 01/24/2018 09:37 AM    ASTSGOT 34 01/24/2018 09:37 AM    ALTSGPT 19 01/24/2018 09:37 AM    TBILIRUBIN 1.5 01/24/2018 09:37 AM

## 2018-04-06 NOTE — TELEPHONE ENCOUNTER
Was the patient seen in the last year in this department? Yes     Does patient have an active prescription for medications requested? No     Received Request Via: Pharmacy      Pt met protocol?: Yes pt last ov 9/17   Lab Results  Component Value Date/Time   CHOLSTRLTOT 216 (H) 01/24/2018 0937   TRIGLYCERIDE 61 01/24/2018 0937   HDL 82 01/24/2018 0937    (H) 01/24/2018 0937

## 2018-04-26 ENCOUNTER — APPOINTMENT (OUTPATIENT)
Dept: RADIOLOGY | Facility: IMAGING CENTER | Age: 83
End: 2018-04-26
Attending: PHYSICIAN ASSISTANT
Payer: MEDICARE

## 2018-04-26 ENCOUNTER — OFFICE VISIT (OUTPATIENT)
Dept: URGENT CARE | Facility: PHYSICIAN GROUP | Age: 83
End: 2018-04-26
Payer: MEDICARE

## 2018-04-26 VITALS
OXYGEN SATURATION: 96 % | HEART RATE: 90 BPM | HEIGHT: 60 IN | WEIGHT: 105 LBS | BODY MASS INDEX: 20.62 KG/M2 | SYSTOLIC BLOOD PRESSURE: 112 MMHG | TEMPERATURE: 98.7 F | DIASTOLIC BLOOD PRESSURE: 58 MMHG

## 2018-04-26 DIAGNOSIS — M25.551 RIGHT HIP PAIN: ICD-10-CM

## 2018-04-26 DIAGNOSIS — M81.0 OSTEOPOROSIS, UNSPECIFIED OSTEOPOROSIS TYPE, UNSPECIFIED PATHOLOGICAL FRACTURE PRESENCE: ICD-10-CM

## 2018-04-26 DIAGNOSIS — M48.56XS COMPRESSION FRACTURE OF LUMBAR SPINE, NON-TRAUMATIC, SEQUELA: ICD-10-CM

## 2018-04-26 DIAGNOSIS — M54.50 ACUTE RIGHT-SIDED LOW BACK PAIN WITHOUT SCIATICA: ICD-10-CM

## 2018-04-26 DIAGNOSIS — Z85.3 HX: BREAST CANCER: ICD-10-CM

## 2018-04-26 PROCEDURE — 99214 OFFICE O/P EST MOD 30 MIN: CPT | Performed by: PHYSICIAN ASSISTANT

## 2018-04-26 PROCEDURE — 73502 X-RAY EXAM HIP UNI 2-3 VIEWS: CPT | Mod: 26,RT | Performed by: PHYSICIAN ASSISTANT

## 2018-04-26 PROCEDURE — 72100 X-RAY EXAM L-S SPINE 2/3 VWS: CPT | Mod: TC | Performed by: PHYSICIAN ASSISTANT

## 2018-04-26 ASSESSMENT — ENCOUNTER SYMPTOMS
MYALGIAS: 0
DIARRHEA: 0
FEVER: 0
ARTHRALGIAS: 1
JOINT SWELLING: 0
BACK PAIN: 1
BLOOD IN STOOL: 0
VOMITING: 0
HEADACHES: 0
CHANGE IN BOWEL HABIT: 0
VERTIGO: 0
NAUSEA: 0
FATIGUE: 0
FOCAL WEAKNESS: 0
WEAKNESS: 0
ANOREXIA: 0
HIP PAIN: 1
NUMBNESS: 0
SENSORY CHANGE: 0
TINGLING: 0
CHILLS: 0
VISUAL CHANGE: 0
NECK PAIN: 0
COUGH: 0
ABDOMINAL PAIN: 0

## 2018-04-26 NOTE — PROGRESS NOTES
Subjective:   Lizz Saldana is a 89 y.o. female who presents for Hip Pain (Right buttock & hip pain after carrying wood on Sunday)    Pt reports she was assisting her  carrying a heavy piece avoid on Sunday. Later that evening she developed pain in the right SI region and hip. The pain has persisted. She has been finding it difficult to get comfortable and is not able to sleep due to the pain. Patient denies any numbness or tingling of extremities, bowel or bladder incontinence, unexplained fevers. The pain does not radiate. She does report that she has been having some difficulty making it to the bathroom quick enough due to the pain and is having some urinary accidents but she denies any actual incontinence, dysuria, urgency or frequency.      Hip Pain   This is a new problem. The current episode started in the past 7 days. The problem occurs constantly. The problem has been gradually worsening. Associated symptoms include arthralgias. Pertinent negatives include no abdominal pain, anorexia, change in bowel habit, chest pain, chills, congestion, coughing, fatigue, fever, headaches, joint swelling, myalgias, nausea, neck pain, numbness, vertigo, visual change, vomiting or weakness. The symptoms are aggravated by standing, twisting, walking, bending and exertion. She has tried NSAIDs (salon Pas) for the symptoms. The treatment provided no relief.     Review of Systems   Constitutional: Negative for chills, fatigue and fever.   HENT: Negative for congestion.    Respiratory: Negative for cough.    Cardiovascular: Negative for chest pain.   Gastrointestinal: Negative for abdominal pain, anorexia, blood in stool, change in bowel habit, diarrhea, melena, nausea and vomiting.   Genitourinary: Negative for dysuria, frequency, hematuria and urgency.   Musculoskeletal: Positive for arthralgias, back pain and joint pain. Negative for joint swelling, myalgias and neck pain.   Neurological: Negative for vertigo, tingling,  sensory change, focal weakness, weakness, numbness and headaches.   All other systems reviewed and are negative.    Allergies   Allergen Reactions   • Codeine Nausea   • Floxin [Ofloxacin] Itching and Swelling   • Lidoderm      rash   • Methylprednisolone Sodium Succ Rash and Itching     Painful skin blistering LE   • Toradol Unspecified     .        Objective:   There were no vitals taken for this visit.  Physical Exam   Constitutional: She is oriented to person, place, and time. She appears well-developed and well-nourished.   Patient is obviously uncomfortable sitting in a chair leaning to her left as to avoid pressure on her right buttock region she is very slow to rise and having difficulty walking   HENT:   Head: Normocephalic and atraumatic.   Right Ear: External ear normal.   Left Ear: External ear normal.   Nose: Nose normal.   Mouth/Throat: Oropharynx is clear and moist. No oropharyngeal exudate.   Eyes: Conjunctivae and EOM are normal. Pupils are equal, round, and reactive to light.   Neck: Normal range of motion. Neck supple.   Cardiovascular: Normal rate, regular rhythm and normal heart sounds.  Exam reveals no gallop and no friction rub.    No murmur heard.  Pulmonary/Chest: Effort normal and breath sounds normal. No respiratory distress. She has no wheezes. She has no rales.   Abdominal: Soft. Bowel sounds are normal. She exhibits no distension and no mass. There is no tenderness. There is no rebound and no guarding.   Musculoskeletal: She exhibits no edema or deformity.   Lumbar spine: No obvious step-off or deformity. No midline lumbar spine tenderness. No paravertebral spinous tenderness or spasm noted.  + Tender over right SI joint.  Unable to assess straight leg raise due to the fact the patient is unable to get on the table.  Right hip: Tender to palpation right SI joint and right lateral hip region. I am only able to evaluate range of motion in the standing position. The patient is unable to  flex the hip due to pain. She does not seem to have limited internal or external rotation or increased pain with this motion.   Lymphadenopathy:     She has no cervical adenopathy.   Neurological: She is alert and oriented to person, place, and time. She has normal strength. No sensory deficit. Gait abnormal. Coordination normal.   Reflex Scores:       Patellar reflexes are 2+ on the left side.       Achilles reflexes are 2+ on the right side and 2+ on the left side.  Antalgic gait.  Unable to assess right patellar reflex due to knee pain related to prior knee replacement   Skin: Skin is warm and dry. No rash noted.   Psychiatric: She has a normal mood and affect. Judgment normal.           Assessment/Plan:   Assessment    1. Acute right-sided low back pain without sciatica  - DX-LUMBAR SPINE-2 OR 3 VIEWS; Future:  Stable mild compression deformity involving the superior endplate of L2.    Multilevel degenerative change affecting the disks and facet joints with mild grade 1 anterolisthesis at L3-4.    - DX-HIPNo radiographic evidence of acute traumatic injury.-COMPLETE - UNILATERAL 2+ RIGHT; Future    - Diclofenac Sodium 1 % Gel; Apply 2 g to skin as directed 3 times a day as needed.  Dispense: 30 g; Refill: 0      Given the patient's age I'm hesitant to have her use oral NSAIDs. We will trial diclofenac gel and recommend she try Tylenol when necessary. If pain persists, recommend that she follow-up with her primary care or return to urgent care for further evaluation.      2. Right hip pain  - DX-LUMBAR SPINE-2 OR 3 VIEWS; Future  - DX-HIP-COMPLETE - UNILATERAL 2+ RIGHT; Future  - Diclofenac Sodium 1 % Gel; Apply 2 g to skin as directed 3 times a day as needed.  Dispense: 30 g; Refill: 0 see #1 above     3. HX: breast cancer    Follow-up with outside provider    4. Osteoporosis, unspecified osteoporosis type, unspecified pathological fracture presence keep follow-up with outside provider       5. Compression  fracture of lumbar spine, non-traumatic, sequela  Reviewed x-ray results with patient. See #1 above.        Differential diagnosis, natural history, supportive care, and indications for immediate follow-up discussed.    If not improving in 3-5 days, F/U with PCP or return to  or sooner if worsens  Strict ER precautions given.    Please note that this note was created using voice recognition speech to text software. Every effort has been made to correct obvious errors.  However, I expect there are errors of grammar and possibly context that were not discovered prior to finalizing the note

## 2018-04-26 NOTE — PATIENT INSTRUCTIONS
Back Pain, Adult  Back pain is very common in adults. The cause of back pain is rarely dangerous and the pain often gets better over time. The cause of your back pain may not be known. Some common causes of back pain include:  · Strain of the muscles or ligaments supporting the spine.  · Wear and tear (degeneration) of the spinal disks.  · Arthritis.  · Direct injury to the back.  For many people, back pain may return. Since back pain is rarely dangerous, most people can learn to manage this condition on their own.  Follow these instructions at home:  Watch your back pain for any changes. The following actions may help to lessen any discomfort you are feeling:  · Remain active. It is stressful on your back to sit or  one place for long periods of time. Do not sit, drive, or  one place for more than 30 minutes at a time. Take short walks on even surfaces as soon as you are able. Try to increase the length of time you walk each day.  · Exercise regularly as directed by your health care provider. Exercise helps your back heal faster. It also helps avoid future injury by keeping your muscles strong and flexible.  · Do not stay in bed. Resting more than 1-2 days can delay your recovery.  · Pay attention to your body when you bend and lift. The most comfortable positions are those that put less stress on your recovering back. Always use proper lifting techniques, including:  ¨ Bending your knees.  ¨ Keeping the load close to your body.  ¨ Avoiding twisting.  · Find a comfortable position to sleep. Use a firm mattress and lie on your side with your knees slightly bent. If you lie on your back, put a pillow under your knees.  · Avoid feeling anxious or stressed. Stress increases muscle tension and can worsen back pain. It is important to recognize when you are anxious or stressed and learn ways to manage it, such as with exercise.  · Take medicines only as directed by your health care provider.  Over-the-counter medicines to reduce pain and inflammation are often the most helpful. Your health care provider may prescribe muscle relaxant drugs. These medicines help dull your pain so you can more quickly return to your normal activities and healthy exercise.  · Apply ice to the injured area:  ¨ Put ice in a plastic bag.  ¨ Place a towel between your skin and the bag.  ¨ Leave the ice on for 20 minutes, 2-3 times a day for the first 2-3 days. After that, ice and heat may be alternated to reduce pain and spasms.  · Maintain a healthy weight. Excess weight puts extra stress on your back and makes it difficult to maintain good posture.  Contact a health care provider if:  · You have pain that is not relieved with rest or medicine.  · You have increasing pain going down into the legs or buttocks.  · You have pain that does not improve in one week.  · You have night pain.  · You lose weight.  · You have a fever or chills.  Get help right away if:  · You develop new bowel or bladder control problems.  · You have unusual weakness or numbness in your arms or legs.  · You develop nausea or vomiting.  · You develop abdominal pain.  · You feel faint.  This information is not intended to replace advice given to you by your health care provider. Make sure you discuss any questions you have with your health care provider.  Document Released: 12/18/2006 Document Revised: 04/27/2017 Document Reviewed: 04/21/2015  [employer] Interactive Patient Education © 2017 [employer]

## 2018-07-31 RX ORDER — GEMFIBROZIL 600 MG/1
600 TABLET, FILM COATED ORAL 2 TIMES DAILY
Qty: 180 TAB | Refills: 0 | Status: SHIPPED | OUTPATIENT
Start: 2018-07-31 | End: 2018-08-28 | Stop reason: SDUPTHER

## 2018-07-31 NOTE — TELEPHONE ENCOUNTER
Was the patient seen in the last year in this department? Yes    Does patient have an active prescription for medications requested? No     Received Request Via: Pharmacy      Pt met protocol?: Yes    OV 9/17

## 2018-08-20 ENCOUNTER — OFFICE VISIT (OUTPATIENT)
Dept: MEDICAL GROUP | Facility: PHYSICIAN GROUP | Age: 83
End: 2018-08-20
Payer: MEDICARE

## 2018-08-20 DIAGNOSIS — Z23 NEED FOR VACCINATION: ICD-10-CM

## 2018-08-20 DIAGNOSIS — M15.9 PRIMARY OSTEOARTHRITIS INVOLVING MULTIPLE JOINTS: ICD-10-CM

## 2018-08-20 DIAGNOSIS — B02.29 POSTHERPETIC NEURALGIA: ICD-10-CM

## 2018-08-20 DIAGNOSIS — H35.30 MACULAR DEGENERATION, UNSPECIFIED LATERALITY, UNSPECIFIED TYPE: ICD-10-CM

## 2018-08-20 DIAGNOSIS — M81.0 OSTEOPOROSIS, UNSPECIFIED OSTEOPOROSIS TYPE, UNSPECIFIED PATHOLOGICAL FRACTURE PRESENCE: ICD-10-CM

## 2018-08-20 DIAGNOSIS — Z85.3 HX: BREAST CANCER: ICD-10-CM

## 2018-08-20 DIAGNOSIS — E78.5 DYSLIPIDEMIA: ICD-10-CM

## 2018-08-20 DIAGNOSIS — R32 URINARY INCONTINENCE, UNSPECIFIED TYPE: ICD-10-CM

## 2018-08-20 DIAGNOSIS — I10 HTN (HYPERTENSION), BENIGN: ICD-10-CM

## 2018-08-20 PROCEDURE — 99214 OFFICE O/P EST MOD 30 MIN: CPT | Mod: 25 | Performed by: PHYSICIAN ASSISTANT

## 2018-08-20 PROCEDURE — 90670 PCV13 VACCINE IM: CPT | Performed by: PHYSICIAN ASSISTANT

## 2018-08-20 PROCEDURE — G0009 ADMIN PNEUMOCOCCAL VACCINE: HCPCS | Performed by: PHYSICIAN ASSISTANT

## 2018-08-20 ASSESSMENT — PATIENT HEALTH QUESTIONNAIRE - PHQ9: CLINICAL INTERPRETATION OF PHQ2 SCORE: 0

## 2018-08-20 NOTE — ASSESSMENT & PLAN NOTE
Continues to have pain in mid-back in area of previous shingles. Pain is intermittent, estimates a couple times per week. Rates as 4/10 on pain scale. Denies medication for symptoms previously and is not interested as symptoms not severe.

## 2018-08-20 NOTE — ASSESSMENT & PLAN NOTE
Treated with felodipine 5 mg daily. Not currently checking home readings. Denies blurry vision, headaches, dizziness, or chest pressure.

## 2018-08-20 NOTE — ASSESSMENT & PLAN NOTE
Treated with lovastatin 20 mg daily and gemfibrozil 600 mg BID. Tolerating well, denies myalgias or other side effects. Last lipid panel in 1/2018.

## 2018-08-20 NOTE — ASSESSMENT & PLAN NOTE
Endorses urinary urgency and occasional stress incontinence. Ongoing symptoms--believes are stable. Denies previous medication for symptoms

## 2018-08-20 NOTE — ASSESSMENT & PLAN NOTE
Follows with ophthalmologist Dr. Talon Nolasco. Sees every 6 months. Believes has been stable. No records currently available to review.

## 2018-08-20 NOTE — ASSESSMENT & PLAN NOTE
Endorses L breast cancer in the 1990s. Underwent L mastectomy and chemotherapy. Continues to have screening mammograms of the right breast--last done in 11/2017.

## 2018-08-20 NOTE — ASSESSMENT & PLAN NOTE
Endorses arthritis in both knees. Underwent R TKA in 2015 which significantly improved pain. Still has pain of left knee. Established with orthopedics--Dr. Serrano--and periodically undergoes steroid injections which help with pain. Occasionally uses Salonpas on knee as well which is helpful.

## 2018-08-21 VITALS
OXYGEN SATURATION: 97 % | SYSTOLIC BLOOD PRESSURE: 122 MMHG | HEIGHT: 60 IN | DIASTOLIC BLOOD PRESSURE: 72 MMHG | BODY MASS INDEX: 20.81 KG/M2 | WEIGHT: 106 LBS | TEMPERATURE: 97.9 F | HEART RATE: 66 BPM

## 2018-08-23 NOTE — PROGRESS NOTES
Subjective:   Lizz Saldana is a 89 y.o. female here today for follow up on hypertension, dyslipidemia and other chronic conditions. Is a new patient to me and is also establishing care today.    Previous PCP: LUIS Quinonez    HPI: Patient has the following current medical problems:    HTN (hypertension), benign  Treated with felodipine 5 mg daily. Not currently checking home readings. Denies blurry vision, headaches, dizziness, or chest pressure.    Dyslipidemia  Treated with lovastatin 20 mg daily and gemfibrozil 600 mg BID. Tolerating well, denies myalgias or other side effects. Last lipid panel in 1/2018.    DJD (degenerative joint disease)  Endorses arthritis in both knees. Underwent R TKA in 2015 which significantly improved pain. Still has pain of left knee. Established with orthopedics--Dr. Serrano--and periodically undergoes steroid injections which help with pain. Occasionally uses Salonpas on knee as well which is helpful.    Hx: Breast Cancer  Endorses L breast cancer in the 1990s. Underwent L mastectomy and chemotherapy. Continues to have screening mammograms of the right breast--last done in 11/2017.    Postherpetic Neuralgia  Continues to have pain in mid-back in area of previous shingles. Pain is intermittent, estimates a couple times per week. Rates as 4/10 on pain scale. Denies medication for symptoms previously and is not interested as symptoms not severe.    Bladder Incontinence  Endorses urinary urgency and occasional stress incontinence. Ongoing symptoms--believes are stable. Denies previous medication for symptoms    Macular degeneration  Follows with ophthalmologist Dr. Talon Nolasco. Sees every 6 months. Believes has been stable. No records currently available to review.    Osteoporosis  Previously treated with 5 years of Fosamax per chart. Patient herself does not recall this. She does continue to take OTC calcium/vitamin D supplement. Last DEXA in 6/2016.       Current medicines  (including changes today)  Current Outpatient Prescriptions   Medication Sig Dispense Refill   • gemfibrozil (LOPID) 600 MG Tab Take 1 Tab by mouth 2 times a day. 180 Tab 0   • Diclofenac Sodium 1 % Gel Apply 2 g to skin as directed 3 times a day as needed. 30 g 0   • lovastatin (MEVACOR) 20 MG Tab TAKE 1 TABLET by mouth DAILY 90 Tab 1   • felodipine (PLENDIL) 5 MG TABLET SR 24 HR TAKE 1 TABLET DAILY FOR BLOOD PRESSURE 90 Tab 1   • Cholecalciferol (VITAMIN D3) 2000 units Tab Take 2,000 Units by mouth every day.     • Multiple Vitamins-Calcium (ONE-A-DAY WOMENS PO) Take  by mouth every day.       No current facility-administered medications for this visit.      She  has a past medical history of Arthritis (2015); Bilateral cataracts; Cancer (HCC) (1990); DJD (degenerative joint disease); High cholesterol; HTN; breast cancer; Hyperlipidemia; Osteoporosis; Pain (7-2015); Pigmented skin lesion (4/30/2015); Postherpetic neuralgia; and Tubular adenoma. She also has no past medical history of Encounter for long-term (current) use of other medications.    ROS  As per HPI.     Objective:     Blood pressure 122/72, pulse 66, temperature 36.6 °C (97.9 °F), height 1.524 m (5'), weight 48.1 kg (106 lb), SpO2 97 %. Body mass index is 20.7 kg/m².     Physical Exam:  Constitutional: Alert, well-appearing elderly female in no distress.  Skin: No rashes in visible areas.  Eye: Conjunctiva clear, lids normal.  ENMT: Lips without lesions, moist mucus membranes.      Assessment and Plan:   The following treatment plan was discussed    1. HTN (hypertension), benign  Chronic issue, well-controlled on daily felodipine. BP today in the office is 122/72. Continue current management.  - COMP METABOLIC PANEL; Future    2. Dyslipidemia  Chronic issue, improved on current medications. Last lipid panel from 1/2018 reviewed which shows mild elevation of total cholesterol and LDL. Other than age and hypertension, patient does not have any other  risk factors for cardiac disease. Will continue current management and re-check lipids in 1/2019.  - COMP METABOLIC PANEL; Future  - LIPID PROFILE; Future    Cholesterol,Tot 216   100 - 199 mg/dL Final   Triglycerides 61  0 - 149 mg/dL Final   HDL 82  >=40 mg/dL Final      <100 mg/dL Final     3. Primary osteoarthritis involving multiple joints  Chronic issue, well-controlled with steroid injections in the L knee by orthopedics. Continue current management.    4. HX: breast cancer  Previous cancer which is currently in remission. Will continue with annual screening mammograms of the right unaffected breast. Due next in 11/2018.    5. Postherpetic neuralgia  Chronic intermittent issue. Symptoms relatively mild per patient. Not interested in medication. Will continue to monitor.    6. Urinary incontinence, unspecified type  Chronic issue, stable, patient denies concern and not interested in treatment at this time. Will continue to monitor.    7. Macular degeneration, unspecified laterality, unspecified type  Chronic issue, stable per patient. Will need to request records from ophthalmology.     8. Osteoporosis, unspecified osteoporosis type, unspecified pathological fracture presence  Successfully treated with Fosamax. Most recent DEXA from 2016 reviewed which was consistent with osteopenia. No further medication indicated. Should continue OTC calcium/vitamin D supplementation.    9. Need for vaccination  - PNEUMOCOCCAL CONJUGATE VACCINE 13-VALENT      Followup: Return in about 6 months (around 2/20/2019) for routine f/u; Short.    Love Lassiter P.A.-C.

## 2018-08-28 ENCOUNTER — TELEPHONE (OUTPATIENT)
Dept: URGENT CARE | Facility: PHYSICIAN GROUP | Age: 83
End: 2018-08-28

## 2018-08-28 RX ORDER — GEMFIBROZIL 600 MG/1
600 TABLET, FILM COATED ORAL 2 TIMES DAILY
Qty: 180 TAB | Refills: 1 | Status: SHIPPED | OUTPATIENT
Start: 2018-08-28 | End: 2018-12-10 | Stop reason: SDUPTHER

## 2018-08-28 NOTE — TELEPHONE ENCOUNTER
Please ask patient if she prefers that I try sending gemfibrozil to another pharmacy or if she prefers that I replace with something else to her mail-order pharmacy.  Love Lassiter P.A.-C.

## 2018-08-28 NOTE — TELEPHONE ENCOUNTER
Pt came into the office today after having received letter from their mail order pharmacy ([facility]) that his medication (Gemfibrozil 600mg tab, one tab BID) is unavailable.  The patient currently has about 52 tablets left as of today.  Please contact the patient and let them know what you will be possibly replacing this medication with and if you will be sending this medication to their mail order facility or their local pharmacy ([facility]). Reena Haley, Med Ass't

## 2018-09-04 DIAGNOSIS — I10 ESSENTIAL HYPERTENSION: ICD-10-CM

## 2018-09-05 RX ORDER — FELODIPINE 5 MG/1
TABLET, EXTENDED RELEASE ORAL
Qty: 90 TAB | Refills: 1 | Status: SHIPPED | OUTPATIENT
Start: 2018-09-05 | End: 2018-12-11 | Stop reason: SDUPTHER

## 2018-09-05 NOTE — TELEPHONE ENCOUNTER
Refill X 6 months, sent to pharmacy.Pt. Seen in the last 6 months per protocol.   Lab Results   Component Value Date/Time    SODIUM 140 01/24/2018 09:37 AM    POTASSIUM 3.7 01/24/2018 09:37 AM    CHLORIDE 103 01/24/2018 09:37 AM    CO2 24 01/24/2018 09:37 AM    GLUCOSE 82 01/24/2018 09:37 AM    BUN 14 01/24/2018 09:37 AM    CREATININE 0.63 01/24/2018 09:37 AM    CREATININE 0.81 02/17/2010 10:19 AM    BUNCREATRAT 23 02/17/2010 10:19 AM    GLOMRATE >59 02/17/2010 10:19 AM

## 2018-09-05 NOTE — TELEPHONE ENCOUNTER
Was the patient seen in the last year in this department? Yes    Does patient have an active prescription for medications requested? No     Received Request Via: Pharmacy      Pt met protocol?: Yes, OV last month   BP Readings from Last 1 Encounters:   08/20/18 122/72

## 2018-10-04 RX ORDER — LOVASTATIN 20 MG/1
20 TABLET ORAL DAILY
Qty: 90 TAB | Refills: 1 | Status: SHIPPED | OUTPATIENT
Start: 2018-10-04 | End: 2019-04-02 | Stop reason: SDUPTHER

## 2018-10-04 NOTE — TELEPHONE ENCOUNTER
Was the patient seen in the last year in this department? Yes    Does patient have an active prescription for medications requested? No     Received Request Via: Pharmacy    Pt met protocol?: Yes     Last OV 08/2018    BP Readings from Last 1 Encounters:   08/20/18 122/72

## 2018-10-18 ENCOUNTER — NON-PROVIDER VISIT (OUTPATIENT)
Dept: MEDICAL GROUP | Facility: PHYSICIAN GROUP | Age: 83
End: 2018-10-18
Payer: MEDICARE

## 2018-10-18 ENCOUNTER — APPOINTMENT (OUTPATIENT)
Dept: MEDICAL GROUP | Facility: PHYSICIAN GROUP | Age: 83
End: 2018-10-18
Payer: MEDICARE

## 2018-10-18 DIAGNOSIS — Z23 NEED FOR VACCINATION: ICD-10-CM

## 2018-10-18 PROCEDURE — G0008 ADMIN INFLUENZA VIRUS VAC: HCPCS | Performed by: FAMILY MEDICINE

## 2018-10-18 PROCEDURE — 90662 IIV NO PRSV INCREASED AG IM: CPT | Performed by: FAMILY MEDICINE

## 2018-10-18 NOTE — PROGRESS NOTES
"Lizz Saldana is a 89 y.o. female here for a non-provider visit for:   FLU    Reason for immunization: Annual Flu Vaccine  Immunization records indicate need for vaccine: Yes, confirmed with Epic  Minimum interval has been met for this vaccine: Yes  ABN completed: Not Indicated    Order and dose verified by: Dilcia SCHUSTER Dated  08/07/2015 was given to patient: Yes  All IAC Questionnaire questions were answered \"No.\"    Patient tolerated injection and no adverse effects were observed or reported: Yes    Pt scheduled for next dose in series: No  "

## 2018-11-07 DIAGNOSIS — R92.30 DENSE BREAST TISSUE ON MAMMOGRAM: ICD-10-CM

## 2018-11-07 DIAGNOSIS — Z12.39 ENCOUNTER FOR SCREENING FOR MALIGNANT NEOPLASM OF BREAST: ICD-10-CM

## 2018-11-08 ENCOUNTER — TELEPHONE (OUTPATIENT)
Dept: MEDICAL GROUP | Facility: PHYSICIAN GROUP | Age: 83
End: 2018-11-08

## 2018-11-08 NOTE — TELEPHONE ENCOUNTER
----- Message from Love Lassiter P.A.-C. sent at 11/7/2018 11:50 AM PST -----  I have ordered. Please inform patient and provide with scheduling number.  Love Lassiter P.A.-C.    ----- Message -----  From: Dilcia Gan, Med Ass't  Sent: 11/7/2018   8:42 AM  To: Love Lassiter P.A.-C.    Pt Informed of Providers Result Note. She would like to move forward with Ultrasound.

## 2018-11-09 NOTE — TELEPHONE ENCOUNTER
Future Appointments       Provider Department Center    [facility] [address]    2/21/2019 3:05 PM Love Lassiter P.A.-C.; [facility]

## 2018-12-10 DIAGNOSIS — I10 ESSENTIAL HYPERTENSION: ICD-10-CM

## 2018-12-10 NOTE — TELEPHONE ENCOUNTER
Pt spouse called stating [facility] is out of gemfibrozil and requesting a temporary supply be sent to their local pharmacy. Please refill as you see fit.         Was the patient seen in the last year in this department? Yes    Does patient have an active prescription for medications requested? No     Received Request Via: Patient

## 2018-12-13 RX ORDER — GEMFIBROZIL 600 MG/1
600 TABLET, FILM COATED ORAL 2 TIMES DAILY
Qty: 180 TAB | Refills: 1 | Status: SHIPPED | OUTPATIENT
Start: 2018-12-13 | End: 2019-06-24 | Stop reason: SDUPTHER

## 2018-12-13 RX ORDER — FELODIPINE 5 MG/1
TABLET, EXTENDED RELEASE ORAL
Qty: 90 TAB | Refills: 1 | Status: SHIPPED | OUTPATIENT
Start: 2018-12-13 | End: 2020-02-07

## 2018-12-14 NOTE — TELEPHONE ENCOUNTER
Refill done. Please inform patient that she is due for fasting lab work next month. Orders are already in chart.  Love Lassiter P.A.-C.

## 2019-01-03 ENCOUNTER — HOSPITAL ENCOUNTER (OUTPATIENT)
Dept: LAB | Facility: MEDICAL CENTER | Age: 84
End: 2019-01-03
Attending: PHYSICIAN ASSISTANT
Payer: MEDICARE

## 2019-01-03 DIAGNOSIS — R74.8 ELEVATED ALKALINE PHOSPHATASE LEVEL: ICD-10-CM

## 2019-01-03 DIAGNOSIS — E78.5 DYSLIPIDEMIA: ICD-10-CM

## 2019-01-03 DIAGNOSIS — I10 HTN (HYPERTENSION), BENIGN: ICD-10-CM

## 2019-01-03 LAB
ALBUMIN SERPL BCP-MCNC: 4.2 G/DL (ref 3.2–4.9)
ALBUMIN/GLOB SERPL: 1.3 G/DL
ALP SERPL-CCNC: 298 U/L (ref 30–99)
ALT SERPL-CCNC: 32 U/L (ref 2–50)
ANION GAP SERPL CALC-SCNC: 11 MMOL/L (ref 0–11.9)
AST SERPL-CCNC: 24 U/L (ref 12–45)
BILIRUB SERPL-MCNC: 0.8 MG/DL (ref 0.1–1.5)
BUN SERPL-MCNC: 16 MG/DL (ref 8–22)
CALCIUM SERPL-MCNC: 9.8 MG/DL (ref 8.5–10.5)
CHLORIDE SERPL-SCNC: 104 MMOL/L (ref 96–112)
CHOLEST SERPL-MCNC: 180 MG/DL (ref 100–199)
CO2 SERPL-SCNC: 26 MMOL/L (ref 20–33)
CREAT SERPL-MCNC: 0.62 MG/DL (ref 0.5–1.4)
GLOBULIN SER CALC-MCNC: 3.2 G/DL (ref 1.9–3.5)
GLUCOSE SERPL-MCNC: 88 MG/DL (ref 65–99)
HDLC SERPL-MCNC: 62 MG/DL
LDLC SERPL CALC-MCNC: 105 MG/DL
POTASSIUM SERPL-SCNC: 3.4 MMOL/L (ref 3.6–5.5)
PROT SERPL-MCNC: 7.4 G/DL (ref 6–8.2)
SODIUM SERPL-SCNC: 141 MMOL/L (ref 135–145)
TRIGL SERPL-MCNC: 64 MG/DL (ref 0–149)

## 2019-01-03 PROCEDURE — 36415 COLL VENOUS BLD VENIPUNCTURE: CPT

## 2019-01-03 PROCEDURE — 80061 LIPID PANEL: CPT

## 2019-01-03 PROCEDURE — 80053 COMPREHEN METABOLIC PANEL: CPT

## 2019-01-09 ENCOUNTER — HOSPITAL ENCOUNTER (OUTPATIENT)
Dept: RADIOLOGY | Facility: MEDICAL CENTER | Age: 84
End: 2019-01-09

## 2019-01-10 ENCOUNTER — HOSPITAL ENCOUNTER (OUTPATIENT)
Dept: RADIOLOGY | Facility: MEDICAL CENTER | Age: 84
End: 2019-01-10
Attending: PHYSICIAN ASSISTANT
Payer: MEDICARE

## 2019-01-10 DIAGNOSIS — R92.30 DENSE BREAST TISSUE ON MAMMOGRAM: ICD-10-CM

## 2019-01-10 DIAGNOSIS — Z12.39 ENCOUNTER FOR SCREENING FOR MALIGNANT NEOPLASM OF BREAST: ICD-10-CM

## 2019-01-10 PROCEDURE — 76641 ULTRASOUND BREAST COMPLETE: CPT

## 2019-01-14 ENCOUNTER — TELEPHONE (OUTPATIENT)
Dept: MEDICAL GROUP | Facility: PHYSICIAN GROUP | Age: 84
End: 2019-01-14

## 2019-01-14 NOTE — TELEPHONE ENCOUNTER
----- Message from Love Lassiter P.A.-C. sent at 1/13/2019 10:40 PM PST -----  Please inform patient of normal breast US results.  Love Lassiter P.A.-C.

## 2019-02-13 ENCOUNTER — TELEPHONE (OUTPATIENT)
Dept: MEDICAL GROUP | Facility: PHYSICIAN GROUP | Age: 84
End: 2019-02-13

## 2019-02-13 NOTE — TELEPHONE ENCOUNTER
Future Appointments       Provider Department Center    2/21/2019 3:05 PM Love Lassiter P.A.-C.; [facility]        ANNUAL WELLNESS VISIT PRE-VISIT PLANNING WITHOUT OUTREACH    1.  Reviewed note from last office visit with PCP: YES    2.  If any orders were placed at last visit, do we have Results/Consult Notes?        •  Labs - Labs ordered, completed on 01/3/2019 and results are in chart.       •  Imaging - Imaging ordered, completed and results are in chart.       •  Referrals - No referrals were ordered at last office visit.    3.  Immunizations were updated in [place] using WebIZ?: Yes       •  WebIZ Recommendations: TDAP and SHINGRIX (Shingles)        •  Is patient due for Tdap? YES. Patient was not notified of copay/out of pocket cost.       •  Is patient due for Shingrix? YES. Patient was not notified of copay/out of pocket cost.     4.  Patient is due for the following Health Maintenance Topics:   Health Maintenance Due   Topic Date Due   • IMM ZOSTER VACCINES (1 of 2) 08/04/1977   • IMM DTaP/Tdap/Td Vaccine (1 - Tdap) 07/29/2009   • Annual Wellness Visit  08/31/2018       5.  Reviewed/Updated the following with patient:       •   Preferred Pharmacy? NO       •   Preferred Lab? NO       •   Preferred Communication? NO       •   Allergies? NO       •   Medications? NO       •   Social History? NO       •   Family History (document living status of immediate family members and if + hx of  cancer, diabetes, hypertension, hyperlipidemia, heart attack, stroke) NO    6.  Care Team Updated:       •   DME Company (gait device, O2, CPAP, etc.): NO       •   Other Specialists (eye doctor, derm, GYN, cardiology, endo, etc): NO    7. Orders for overdue Health Maintenance topics pended in Pre-Charting? NO    8.  Patient has the following Care Path diagnoses on Problem List:  NONE    9.  Patient was not advised: “This is a free wellness visit. The provider will  screen for medical conditions to help you stay healthy. If you have other concerns to address you may be asked to discuss these at a separate visit or there may be an additional fee.”     10.  Patient was informed to arrive 15 min prior to their scheduled appointment and bring in their medication bottles.[facility] was unable to get in contact with Pt. prior to visit to complete PVP.

## 2019-03-04 ENCOUNTER — TELEPHONE (OUTPATIENT)
Dept: MEDICAL GROUP | Facility: PHYSICIAN GROUP | Age: 84
End: 2019-03-04

## 2019-03-04 NOTE — TELEPHONE ENCOUNTER
Future Appointments       Provider Department Center    3/18/2019 9:30 AM Priscilla Ramos M.D. [facility]    4/15/2019 2:45 PM Love Lassiter P.A.-C.; [facility]        ANNUAL WELLNESS VISIT PRE-VISIT PLANNING WITHOUT OUTREACH    1.  Reviewed note from last office visit with PCP: YES    2.  If any orders were placed at last visit, do we have Results/Consult Notes?        •  Labs - Labs ordered, NOT completed. Patient advised to complete prior to next appointment.       •  Imaging - Imaging ordered, completed and results are in chart.       •  Referrals - No referrals were ordered at last office visit.    3.  Immunizations were updated in [place] using WebIZ?: Yes       •  WebIZ Recommendations: TDAP and SHINGRIX (Shingles)        •  Is patient due for Tdap? YES. Patient was not notified of copay/out of pocket cost.       •  Is patient due for Shingrix? YES. Patient was not notified of copay/out of pocket cost.     4.  Patient is due for the following Health Maintenance Topics:   Health Maintenance Due   Topic Date Due   • IMM ZOSTER VACCINES (1 of 2) 08/04/1977   • IMM DTaP/Tdap/Td Vaccine (1 - Tdap) 07/29/2009   • Annual Wellness Visit  08/31/2018       5.  Reviewed/Updated the following with patient:       •   Preferred Pharmacy? YES       •   Preferred Lab? YES       •   Preferred Communication? YES       •   Allergies? YES       •   Medications? NO       •   Social History? YES. Was Abstract Encounter opened and chart updated? YES       •   Family History (document living status of immediate family members and if + hx of  cancer, diabetes, hypertension, hyperlipidemia, heart attack, stroke) YES. Was Abstract Encounter opened and chart updated? YES    6.  Care Team Updated:       •   DME Company (gait device, O2, CPAP, etc.): YES       •   Other Specialists (eye doctor, derm, GYN, cardiology, endo, etc):  YES    7. Orders for overdue Health Maintenance topics pended in Pre-Charting? YES    8.  Patient has the following Care Path diagnoses on Problem List:  NONE    9.  Patient was not advised: “This is a free wellness visit. The provider will screen for medical conditions to help you stay healthy. If you have other concerns to address you may be asked to discuss these at a separate visit or there may be an additional fee.”     10.  Patient was informed to arrive 15 min prior to their scheduled appointment and bring in their medication bottles.

## 2019-03-18 ENCOUNTER — OFFICE VISIT (OUTPATIENT)
Dept: DERMATOLOGY | Facility: IMAGING CENTER | Age: 84
End: 2019-03-18
Payer: MEDICARE

## 2019-03-18 VITALS — WEIGHT: 105 LBS | BODY MASS INDEX: 20.62 KG/M2 | HEIGHT: 60 IN | TEMPERATURE: 98.1 F

## 2019-03-18 DIAGNOSIS — L81.4 LENTIGINES: ICD-10-CM

## 2019-03-18 DIAGNOSIS — L82.1 MACULAR SEBORRHEIC KERATOSIS: ICD-10-CM

## 2019-03-18 PROCEDURE — 99202 OFFICE O/P NEW SF 15 MIN: CPT | Performed by: DERMATOLOGY

## 2019-03-18 ASSESSMENT — ENCOUNTER SYMPTOMS
CHILLS: 0
FEVER: 0

## 2019-03-18 NOTE — PROGRESS NOTES
Dermatology New Patient Visit    Chief Complaint   Patient presents with   • Skin Lesion       Subjective:     HPI:   Lizz Saldana is a 89 y.o. female presenting for    Brown lesions on face.    HPI/location: RT and LT cheeks  Time present: 3-4 years  Painful lesion: No  Itching lesion: No  Enlarging lesion: Yes, slowly over time  Anything make it better or worse? n/a    History of skin cancer: No  History of precancers/actinic keratoses: Yes  History of biopsies:No  History of blistering/severe sunburns:No  Family history of skin cancer:No  Family history of atypical moles:No        Past Medical History:   Diagnosis Date   • Arthritis 2015    left knee   • Bilateral cataracts     bilateral IOL   • Cancer (HCC) 1990    Breast    • DJD (degenerative joint disease)    • High cholesterol    • HTN    • HX: breast cancer    • Hyperlipidemia    • Osteoporosis    • Pain 7-2015    right knee   • Pigmented skin lesion 4/30/2015   • Postherpetic neuralgia    • Tubular adenoma        Current Outpatient Prescriptions on File Prior to Visit   Medication Sig Dispense Refill   • gemfibrozil (LOPID) 600 MG Tab Take 1 Tab by mouth 2 times a day. 180 Tab 1   • felodipine (PLENDIL) 5 MG TABLET SR 24 HR TAKE 1 TABLET DAILY FOR BLOOD PRESSURE 90 Tab 1   • lovastatin (MEVACOR) 20 MG Tab Take 1 Tab by mouth every day. 90 Tab 1   • Diclofenac Sodium 1 % Gel Apply 2 g to skin as directed 3 times a day as needed. 30 g 0   • Cholecalciferol (VITAMIN D3) 2000 units Tab Take 2,000 Units by mouth every day.     • Multiple Vitamins-Calcium (ONE-A-DAY WOMENS PO) Take  by mouth every day.       No current facility-administered medications on file prior to visit.        Allergies   Allergen Reactions   • Codeine Nausea   • Floxin [Ofloxacin] Itching and Swelling   • Lidoderm      rash   • Methylprednisolone Sodium Succ Rash and Itching     Painful skin blistering LE   • Toradol Unspecified     .       Family History   Problem Relation Age of Onset    • Hypertension Mother    • Other Father    • Other Sister         alzheimer       Social History     Social History   • Marital status:      Spouse name: N/A   • Number of children: 0   • Years of education: N/A     Occupational History   •  Retired     Social History Main Topics   • Smoking status: Never Smoker   • Smokeless tobacco: Never Used   • Alcohol use No   • Drug use: No   • Sexual activity: No     Other Topics Concern   • Not on file     Social History Narrative   • No narrative on file       Review of Systems   Constitutional: Negative for chills and fever.   Skin: Negative for itching and rash.   All other systems reviewed and are negative.       Objective:     A focused cutaneous exam was completed including: scalp, hair, ears, face, eyelids, conjunctiva, lips, neck with the following pertinent findings listed below. Remaining above-listed examined areas within normal limits / negative for rashes or lesions.    Temperature 36.7 °C (98.1 °F), height 1.524 m (5'), weight 47.6 kg (105 lb), not currently breastfeeding.    Physical Exam   Constitutional: She is oriented to person, place, and time and well-developed, well-nourished, and in no distress.   HENT:   Head: Normocephalic and atraumatic.       Eyes: Conjunctivae and lids are normal.   Neck: Normal range of motion.   Pulmonary/Chest: Effort normal.   Neurological: She is alert and oriented to person, place, and time.   Skin: Skin is warm and dry.   Psychiatric: Mood and affect normal.   Vitals reviewed.      DATA: none applicable to review    Assessment and Plan:     1. Lentigines  - Benign-appearing nature of lesions discussed. Advised to return to clinic for any new or concerning changes.    2. Macular seborrheic keratosis  - Benign-appearing nature of lesions discussed. Advised to return to clinic for any new or concerning changes.  - ABCDE's of melanoma discussed  - test spot of cryo/removal today after r/b/a discussed; discussed how to  schedule for further treatment if desired    Followup: Return for 4-6 months, spot check.    Priscilla Ramos M.D.

## 2019-03-22 ENCOUNTER — APPOINTMENT (OUTPATIENT)
Dept: DERMATOLOGY | Facility: IMAGING CENTER | Age: 84
End: 2019-03-22

## 2019-04-02 NOTE — TELEPHONE ENCOUNTER
Was the patient seen in the last year in this department? Yes    Does patient have an active prescription for medications requested? No     Received Request Via: Pharmacy      Pt met protocol?: Yes, OV 8/18   Lab Results   Component Value Date/Time    CHOLSTRLTOT 180 01/03/2019 09:38 AM     (H) 01/03/2019 09:38 AM    HDL 62 01/03/2019 09:38 AM    TRIGLYCERIDE 64 01/03/2019 09:38 AM       Lab Results   Component Value Date/Time    SODIUM 141 01/03/2019 09:38 AM    POTASSIUM 3.4 (L) 01/03/2019 09:38 AM    CHLORIDE 104 01/03/2019 09:38 AM    CO2 26 01/03/2019 09:38 AM    GLUCOSE 88 01/03/2019 09:38 AM    BUN 16 01/03/2019 09:38 AM    CREATININE 0.62 01/03/2019 09:38 AM    CREATININE 0.81 02/17/2010 10:19 AM    BUNCREATRAT 23 02/17/2010 10:19 AM    GLOMRATE >59 02/17/2010 10:19 AM     Lab Results   Component Value Date/Time    ALKPHOSPHAT 298 (H) 01/03/2019 09:38 AM    ASTSGOT 24 01/03/2019 09:38 AM    ALTSGPT 32 01/03/2019 09:38 AM    TBILIRUBIN 0.8 01/03/2019 09:38 AM

## 2019-04-03 RX ORDER — LOVASTATIN 20 MG/1
TABLET ORAL
Qty: 90 TAB | Refills: 2 | Status: SHIPPED | OUTPATIENT
Start: 2019-04-03 | End: 2020-06-12 | Stop reason: SDUPTHER

## 2019-04-03 NOTE — TELEPHONE ENCOUNTER
Pt has had OV within the 12 month protocol and lipid panel is current. 9 month supply sent to pharmacy.   Lab Results   Component Value Date/Time    CHOLSTRLTOT 180 01/03/2019 09:38 AM     (H) 01/03/2019 09:38 AM    HDL 62 01/03/2019 09:38 AM    TRIGLYCERIDE 64 01/03/2019 09:38 AM       Lab Results   Component Value Date/Time    SODIUM 141 01/03/2019 09:38 AM    POTASSIUM 3.4 (L) 01/03/2019 09:38 AM    CHLORIDE 104 01/03/2019 09:38 AM    CO2 26 01/03/2019 09:38 AM    GLUCOSE 88 01/03/2019 09:38 AM    BUN 16 01/03/2019 09:38 AM    CREATININE 0.62 01/03/2019 09:38 AM    CREATININE 0.81 02/17/2010 10:19 AM    BUNCREATRAT 23 02/17/2010 10:19 AM    GLOMRATE >59 02/17/2010 10:19 AM     Lab Results   Component Value Date/Time    ALKPHOSPHAT 298 (H) 01/03/2019 09:38 AM    ASTSGOT 24 01/03/2019 09:38 AM    ALTSGPT 32 01/03/2019 09:38 AM    TBILIRUBIN 0.8 01/03/2019 09:38 AM

## 2019-04-05 ENCOUNTER — APPOINTMENT (OUTPATIENT)
Dept: DERMATOLOGY | Facility: IMAGING CENTER | Age: 84
End: 2019-04-05

## 2019-04-15 ENCOUNTER — OFFICE VISIT (OUTPATIENT)
Dept: MEDICAL GROUP | Facility: PHYSICIAN GROUP | Age: 84
End: 2019-04-15
Payer: MEDICARE

## 2019-04-15 VITALS
SYSTOLIC BLOOD PRESSURE: 150 MMHG | DIASTOLIC BLOOD PRESSURE: 78 MMHG | HEIGHT: 61 IN | TEMPERATURE: 98.3 F | WEIGHT: 105 LBS | HEART RATE: 86 BPM | BODY MASS INDEX: 19.83 KG/M2

## 2019-04-15 DIAGNOSIS — Z00.00 MEDICARE ANNUAL WELLNESS VISIT, SUBSEQUENT: ICD-10-CM

## 2019-04-15 DIAGNOSIS — M81.0 OSTEOPOROSIS, UNSPECIFIED OSTEOPOROSIS TYPE, UNSPECIFIED PATHOLOGICAL FRACTURE PRESENCE: ICD-10-CM

## 2019-04-15 DIAGNOSIS — Z85.3 HX: BREAST CANCER: ICD-10-CM

## 2019-04-15 DIAGNOSIS — B02.29 POSTHERPETIC NEURALGIA: ICD-10-CM

## 2019-04-15 DIAGNOSIS — R32 URINARY INCONTINENCE, UNSPECIFIED TYPE: ICD-10-CM

## 2019-04-15 DIAGNOSIS — H35.30 MACULAR DEGENERATION, UNSPECIFIED LATERALITY, UNSPECIFIED TYPE: ICD-10-CM

## 2019-04-15 DIAGNOSIS — I10 HTN (HYPERTENSION), BENIGN: ICD-10-CM

## 2019-04-15 DIAGNOSIS — D36.9 TUBULAR ADENOMA: ICD-10-CM

## 2019-04-15 DIAGNOSIS — E78.5 DYSLIPIDEMIA: ICD-10-CM

## 2019-04-15 DIAGNOSIS — M15.9 PRIMARY OSTEOARTHRITIS INVOLVING MULTIPLE JOINTS: ICD-10-CM

## 2019-04-15 PROCEDURE — G0439 PPPS, SUBSEQ VISIT: HCPCS | Performed by: PHYSICIAN ASSISTANT

## 2019-04-15 ASSESSMENT — PATIENT HEALTH QUESTIONNAIRE - PHQ9: CLINICAL INTERPRETATION OF PHQ2 SCORE: 0

## 2019-04-15 ASSESSMENT — ENCOUNTER SYMPTOMS: GENERAL WELL-BEING: GOOD

## 2019-04-15 ASSESSMENT — ACTIVITIES OF DAILY LIVING (ADL): BATHING_REQUIRES_ASSISTANCE: 0

## 2019-04-15 NOTE — ASSESSMENT & PLAN NOTE
Chronic issue, historically well-controlled with felodipine 5 mg daily. Initial BP reading upon arrival was high at 160/80. Went down to 150/78 on my re-check. She is not currently checking at home but does have monitor, so I have advised her to start checking going forward and contact the office in 2-3 weeks with her readings. If persistently elevated at home, will increase her medication.

## 2019-04-15 NOTE — ASSESSMENT & PLAN NOTE
Chronic issue, stable. Continues to have intermittent pain in mid-back since previous shingles. It does not bother her to the point where she would like to try medication. Will continue to monitor.

## 2019-04-15 NOTE — PATIENT INSTRUCTIONS
-Call office in 2-3 weeks with recent blood pressure readings so I can review, as you may need adjustment of your medication

## 2019-04-15 NOTE — ASSESSMENT & PLAN NOTE
Chronic issue, stable, previously completed treatment with Fosamax. Recommend continuing calcium and vitamin D supplementation.

## 2019-04-15 NOTE — PROGRESS NOTES
Chief Complaint   Patient presents with   • Annual Wellness Visit         HPI:  Lizz is a 89 y.o. here for Medicare Annual Wellness Visit. Is an established patient of mine.        Patient Active Problem List    Diagnosis Date Noted   • Macular degeneration 09/07/2012   • Osteoporosis 09/09/2011   • HTN (hypertension), benign 12/17/2009   • Dyslipidemia    • DJD (degenerative joint disease)    • HX: breast cancer    • Tubular adenoma    • Postherpetic neuralgia    • Bladder incontinence        Current Outpatient Prescriptions   Medication Sig Dispense Refill   • lovastatin (MEVACOR) 20 MG Tab TAKE 1 TABLET DAILY 90 Tab 2   • gemfibrozil (LOPID) 600 MG Tab Take 1 Tab by mouth 2 times a day. 180 Tab 1   • felodipine (PLENDIL) 5 MG TABLET SR 24 HR TAKE 1 TABLET DAILY FOR BLOOD PRESSURE 90 Tab 1   • Cholecalciferol (VITAMIN D3) 2000 units Tab Take 2,000 Units by mouth every day.     • Multiple Vitamins-Calcium (ONE-A-DAY WOMENS PO) Take  by mouth every day.       No current facility-administered medications for this visit.         Patient is taking medications as noted in medication list.  Current supplements as per medication list.     Allergies: Codeine; Floxin [ofloxacin]; Lidoderm; Methylprednisolone sodium succ; and Toradol    Current social contact/activities: volunteer local library       Is patient current with immunizations? No, due for TDAP and SHINGRIX (Shingles). Patient is interested in receiving NONE today.    She  reports that she has never smoked. She has never used smokeless tobacco. She reports that she does not drink alcohol or use drugs.  Counseling given: Not Answered        DPA/Advanced directive: Patient does not have an Advanced Directive.  A packet and workshop information was given on Advanced Directives.    ROS:    Gait: Uses no assistive device   Ostomy: No   Other tubes: No   Amputations: No   Chronic oxygen use No   Last eye exam 02/2019   Wears hearing aids: No   : Reports urinary  leakage during the last 6 months that has somewhat interfered with their daily activities or sleep.      Depression Screening    Little interest or pleasure in doing things?  0 - not at all  Feeling down, depressed, or hopeless? 0 - not at all  Patient Health Questionnaire Score: 0    If depressive symptoms identified deferred to follow up visit unless specifically addressed in assessment and plan.    Interpretation of PHQ-9 Total Score   Score Severity   1-4 No Depression   5-9 Mild Depression   10-14 Moderate Depression   15-19 Moderately Severe Depression   20-27 Severe Depression    Screening for Cognitive Impairment    Three Minute Recall (village, kitchen, baby)  3/3 Village kitchen baby   Tesfaye clock face with all 12 numbers and set the hands to show 10 past 10.  No 10:10 3/5  If cognitive concerns identified, deferred for follow up unless specifically addressed in assessment and plan.    Fall Risk Assessment    Has the patient had two or more falls in the last year or any fall with injury in the last year?  No  If fall risk identified, deferred for follow up unless specifically addressed in assessment and plan.    Safety Assessment    Throw rugs on floor.  No  Handrails on all stairs.  Yes  Good lighting in all hallways.  Yes  Difficulty hearing.  No  Patient counseled about all safety risks that were identified.    Functional Assessment ADLs    Are there any barriers preventing you from cooking for yourself or meeting nutritional needs?  No.    Are there any barriers preventing you from driving safely or obtaining transportation?  No.    Are there any barriers preventing you from using a telephone or calling for help?  No.    Are there any barriers preventing you from shopping?  No.    Are there any barriers preventing you from taking care of your own finances?  No.    Are there any barriers preventing you from managing your medications?  No.    Are there any barriers preventing you from showering, bathing  or dressing yourself?  No.    Are you currently engaging in any exercise or physical activity?  Yes.  Bike   What is your perception of your health?  Good.    Health Maintenance Summary                IMM ZOSTER VACCINES Overdue 8/4/1977     IMM DTaP/Tdap/Td Vaccine Overdue 7/29/2009      Done 7/28/2009 Imm Admin: TD Vaccine    Annual Wellness Visit Overdue 8/31/2018      Done 8/30/2017 Visit Dx: Medicare annual wellness visit, subsequent     Patient has more history with this topic...    MAMMOGRAM Next Due 11/2/2019      Done 11/2/2018 [id number]     Patient has more history with this topic...          Patient Care Team:  Love Lassiter P.A.-C. as PCP - General (Physician Assistant)  Ashish Serrano M.D. (Orthopaedics)    Social History   Substance Use Topics   • Smoking status: Never Smoker   • Smokeless tobacco: Never Used   • Alcohol use No     Family History   Problem Relation Age of Onset   • Hypertension Mother    • Other Father    • Other Sister         alzheimer     She  has a past medical history of Arthritis (2015); Bilateral cataracts; Cancer (HCC) (1990); DJD (degenerative joint disease); High cholesterol; HTN; breast cancer; Hyperlipidemia; Osteoporosis; Pain (7-2015); Pigmented skin lesion (4/30/2015); Postherpetic neuralgia; and Tubular adenoma. She also has no past medical history of Encounter for long-term (current) use of other medications.   Past Surgical History:   Procedure Laterality Date   • KNEE MANIPULATION Right 10/12/2015    Procedure: KNEE MANIPULATION;  Surgeon: Ashish Serrano M.D.;  Location: SURGERY [facility];  Service:    • KNEE ARTHROPLASTY TOTAL Right 7/13/2015    Procedure: KNEE ARTHROPLASTY TOTAL;  Surgeon: Ashish Serrano M.D.;  Location: [facility];  Service:    • CATARACT EXTRACTION WITH IOL  2000    Bilateral   • ROTATOR CUFF REPAIR  1990    Left shoulder   • MASTECTOMY  1990    Left   • CYST EXCISION Left 1989    ganglion cyst  "removal, wrist   • HYSTERECTOMY, TOTAL ABDOMINAL  1970           Exam:     /78 (BP Location: Right arm, Patient Position: Sitting, BP Cuff Size: Adult)   Pulse 86   Temp 36.8 °C (98.3 °F) (Temporal)   Ht 1.549 m (5' 1\")   Wt 47.6 kg (105 lb)  Body mass index is 19.84 kg/m².    Hearing good.    Dentition good  Alert, oriented in no acute distress.  Eye contact is good, speech goal directed, affect calm  Normal S1/S2, RRR  Lungs CTA bilat      Assessment and Plan. The following treatment and monitoring plan is recommended:      Macular degeneration  Chronic issue, stable. Follows regularly with optometrist--Dr. Hebert. Continue monitoring.    Dyslipidemia  Chronic issue, well-controlled with lovastatin 20 mg daily and gemfibrozil 600 mg twice daily. Continue current management.    Bladder Incontinence  Chronic issue, stable per patient. Endorses ongoing leakage and urinary frequency but isn't significantly bothered by symptoms to want medication. We discussed that Kegal exercises can be helpful. Will continue to monitor.    DJD (degenerative joint disease)  Chronic issue, stable. Only has pain of left knee but this is improved with periodic steroid injection. Right knee does not bother her since previous TKA in 2015. She will continue to follow with orthopedics.    HTN (hypertension), benign  Chronic issue, historically well-controlled with felodipine 5 mg daily. Initial BP reading upon arrival was high at 160/80. Went down to 150/78 on my re-check. She is not currently checking at home but does have monitor, so I have advised her to start checking going forward and contact the office in 2-3 weeks with her readings. If persistently elevated at home, will increase her medication.    Hx: Breast Cancer  Previous left-sided breast cancer which was treated with unilateral mastectomy and chemotherapy. She continues with annual screening mammograms of the right breast, last done in 11/2018.    Osteoporosis  Chronic " issue, stable, previously completed treatment with Fosamax. Recommend continuing calcium and vitamin D supplementation.    Postherpetic Neuralgia  Chronic issue, stable. Continues to have intermittent pain in mid-back since previous shingles. It does not bother her to the point where she would like to try medication. Will continue to monitor.    Tubular Adenoma  Previous adenomas on prior colonoscopy. No longer undergoing screening colonoscopies due to age.         Services suggested: No services needed at this time  Health Care Screening recommendations as per orders if indicated.  Referrals offered: PT/OT/Nutrition counseling/Behavioral Health/Smoking cessation as per orders if indicated.    Discussion today about general wellness and lifestyle habits:    · Prevent falls and reduce trip hazards; Cautioned about securing or removing rugs.  · Have a working fire alarm and carbon monoxide detector;   · Engage in regular physical activity and social activities       Follow-up: Return in about 6 months (around 10/15/2019) for f/u HTN; Short.     Love Lassiter P.A.-C.

## 2019-04-15 NOTE — ASSESSMENT & PLAN NOTE
Chronic issue, well-controlled with lovastatin 20 mg daily and gemfibrozil 600 mg twice daily. Continue current management.

## 2019-04-15 NOTE — ASSESSMENT & PLAN NOTE
Previous left-sided breast cancer which was treated with unilateral mastectomy and chemotherapy. She continues with annual screening mammograms of the right breast, last done in 11/2018.

## 2019-04-15 NOTE — ASSESSMENT & PLAN NOTE
Chronic issue, stable. Only has pain of left knee but this is improved with periodic steroid injection. Right knee does not bother her since previous TKA in 2015. She will continue to follow with orthopedics.

## 2019-04-22 ENCOUNTER — APPOINTMENT (OUTPATIENT)
Dept: DERMATOLOGY | Facility: IMAGING CENTER | Age: 84
End: 2019-04-22

## 2019-04-24 ENCOUNTER — HOSPITAL ENCOUNTER (OUTPATIENT)
Dept: LAB | Facility: MEDICAL CENTER | Age: 84
End: 2019-04-24
Attending: PHYSICIAN ASSISTANT
Payer: MEDICARE

## 2019-04-24 DIAGNOSIS — R74.8 ELEVATED ALKALINE PHOSPHATASE LEVEL: ICD-10-CM

## 2019-04-24 LAB
ALBUMIN SERPL BCP-MCNC: 4.4 G/DL (ref 3.2–4.9)
ALP SERPL-CCNC: 118 U/L (ref 30–99)
ALT SERPL-CCNC: 16 U/L (ref 2–50)
AST SERPL-CCNC: 25 U/L (ref 12–45)
BILIRUB CONJ SERPL-MCNC: 0.2 MG/DL (ref 0.1–0.5)
BILIRUB INDIRECT SERPL-MCNC: 0.7 MG/DL (ref 0–1)
BILIRUB SERPL-MCNC: 0.9 MG/DL (ref 0.1–1.5)
PROT SERPL-MCNC: 7.4 G/DL (ref 6–8.2)

## 2019-04-24 PROCEDURE — 80076 HEPATIC FUNCTION PANEL: CPT

## 2019-04-24 PROCEDURE — 36415 COLL VENOUS BLD VENIPUNCTURE: CPT

## 2019-04-25 ENCOUNTER — TELEPHONE (OUTPATIENT)
Dept: MEDICAL GROUP | Facility: PHYSICIAN GROUP | Age: 84
End: 2019-04-25

## 2019-04-25 NOTE — TELEPHONE ENCOUNTER
----- Message from Love Lassiter P.A.-C. sent at 4/25/2019 12:20 PM PDT -----  Please inform patient that her alkaline phosphatase level (liver enzyme test) came down significantly from 3 months ago.  It is now only mildly elevated and I am not concerned.  No further evaluation required.  Love Lassiter P.A.-C.

## 2019-05-13 ENCOUNTER — APPOINTMENT (OUTPATIENT)
Dept: DERMATOLOGY | Facility: IMAGING CENTER | Age: 84
End: 2019-05-13

## 2019-05-31 ENCOUNTER — APPOINTMENT (OUTPATIENT)
Dept: DERMATOLOGY | Facility: IMAGING CENTER | Age: 84
End: 2019-05-31

## 2019-06-24 RX ORDER — GEMFIBROZIL 600 MG/1
600 TABLET, FILM COATED ORAL 2 TIMES DAILY
Qty: 180 TAB | Refills: 1 | Status: SHIPPED | OUTPATIENT
Start: 2019-06-24 | End: 2019-09-20

## 2019-06-24 NOTE — TELEPHONE ENCOUNTER
Pt has had OV within the 12 month protocol and lipid panel is current. 6 month supply sent to pharmacy.   Lab Results   Component Value Date/Time    CHOLSTRLTOT 180 01/03/2019 09:38 AM     (H) 01/03/2019 09:38 AM    HDL 62 01/03/2019 09:38 AM    TRIGLYCERIDE 64 01/03/2019 09:38 AM       Lab Results   Component Value Date/Time    SODIUM 141 01/03/2019 09:38 AM    POTASSIUM 3.4 (L) 01/03/2019 09:38 AM    CHLORIDE 104 01/03/2019 09:38 AM    CO2 26 01/03/2019 09:38 AM    GLUCOSE 88 01/03/2019 09:38 AM    BUN 16 01/03/2019 09:38 AM    CREATININE 0.62 01/03/2019 09:38 AM    CREATININE 0.81 02/17/2010 10:19 AM    BUNCREATRAT 23 02/17/2010 10:19 AM    GLOMRATE >59 02/17/2010 10:19 AM     Lab Results   Component Value Date/Time    ALKPHOSPHAT 118 (H) 04/24/2019 08:21 AM    ASTSGOT 25 04/24/2019 08:21 AM    ALTSGPT 16 04/24/2019 08:21 AM    TBILIRUBIN 0.9 04/24/2019 08:21 AM

## 2019-07-26 ENCOUNTER — APPOINTMENT (OUTPATIENT)
Dept: DERMATOLOGY | Facility: IMAGING CENTER | Age: 84
End: 2019-07-26

## 2019-08-07 ENCOUNTER — OFFICE VISIT (OUTPATIENT)
Dept: MEDICAL GROUP | Facility: PHYSICIAN GROUP | Age: 84
End: 2019-08-07
Payer: MEDICARE

## 2019-08-07 VITALS
TEMPERATURE: 98.8 F | DIASTOLIC BLOOD PRESSURE: 70 MMHG | SYSTOLIC BLOOD PRESSURE: 150 MMHG | HEIGHT: 61 IN | OXYGEN SATURATION: 91 % | BODY MASS INDEX: 19.83 KG/M2 | HEART RATE: 98 BPM | WEIGHT: 105 LBS

## 2019-08-07 DIAGNOSIS — L60.8 PINCER NAIL DEFORMITY: ICD-10-CM

## 2019-08-07 PROCEDURE — 99213 OFFICE O/P EST LOW 20 MIN: CPT | Performed by: PHYSICIAN ASSISTANT

## 2019-08-07 NOTE — PROGRESS NOTES
"Subjective:   Lizz Saldana is a 89 y.o. female here today for ingrown toenail. Is an established patient of mine.    HPI:    Patient presents to the office today with complaints of an ingrown toenail of her right 5th toe. She states that when she last had her toenails trimmed at the salon, she was told that her toenails were ingrown. She started noticing some discomfort of her right 5th toe last week. Discomfort is worse when she wears shoes. Denies any actual pain.      Current medicines (including changes today)  Current Outpatient Medications   Medication Sig Dispense Refill   • gemfibrozil (LOPID) 600 MG Tab Take 1 Tab by mouth 2 times a day. 180 Tab 1   • lovastatin (MEVACOR) 20 MG Tab TAKE 1 TABLET DAILY 90 Tab 2   • felodipine (PLENDIL) 5 MG TABLET SR 24 HR TAKE 1 TABLET DAILY FOR BLOOD PRESSURE 90 Tab 1   • Cholecalciferol (VITAMIN D3) 2000 units Tab Take 2,000 Units by mouth every day.     • Multiple Vitamins-Calcium (ONE-A-DAY WOMENS PO) Take  by mouth every day.       No current facility-administered medications for this visit.      She  has a past medical history of Arthritis (2015), Bilateral cataracts, Cancer (HCC) (1990), DJD (degenerative joint disease), High cholesterol, HTN, breast cancer, Hyperlipidemia, Osteoporosis, Pain (7-2015), Pigmented skin lesion (4/30/2015), Postherpetic neuralgia, and Tubular adenoma. She also has no past medical history of Encounter for long-term (current) use of other medications.    ROS  As per HPI.       Objective:     /70 (BP Location: Right arm, Patient Position: Sitting, BP Cuff Size: Small adult)   Pulse 98   Temp 37.1 °C (98.8 °F)   Ht 1.549 m (5' 1\")   Wt 47.6 kg (105 lb)   SpO2 91%  Body mass index is 19.84 kg/m².     Physical Exam:  Constitutional: Alert, well-appearing, very pleasant, no distress.  Skin: Warm, dry, good turgor, no rashes in visible areas.  Eye: Pupils are equal and round, conjunctiva clear, lids normal.  ENMT: Lips without " lesions, moist mucus membranes.  Extremities: Focused exam performed to the right foot. There is mild pincer deformity of the 5th toenail and toenail is thickened. There is no surrounding erythema, edema/fluctuance. No tenderness.     Assessment and Plan:   The following treatment plan was discussed    1. Pincer nail deformity  New problem, uncontrolled.  Explained to patient that she appears to have a pincer deformity of her fifth toenail.  It does not appear ingrown.  There is no evidence of infection.  Recommend that she establish care with podiatry for toenail maintenance.  She is agreeable.  I have ordered referral.  - REFERRAL TO PODIATRY      Followup: Return for f/u with podiatry.    Love Lassiter P.A.-C.

## 2019-08-19 ENCOUNTER — OFFICE VISIT (OUTPATIENT)
Dept: DERMATOLOGY | Facility: IMAGING CENTER | Age: 84
End: 2019-08-19
Payer: MEDICARE

## 2019-08-19 DIAGNOSIS — L82.1 SEBORRHEIC KERATOSES: ICD-10-CM

## 2019-08-19 DIAGNOSIS — L81.4 LENTIGINES: ICD-10-CM

## 2019-08-19 PROCEDURE — 99211 OFF/OP EST MAY X REQ PHY/QHP: CPT | Performed by: DERMATOLOGY

## 2019-08-19 ASSESSMENT — ENCOUNTER SYMPTOMS
FEVER: 0
CHILLS: 0

## 2019-08-19 NOTE — PROGRESS NOTES
Dermatology Return Patient Visit    Chief Complaint   Patient presents with   • Follow-Up   • Skin Lesion       Subjective:     HPI:   Lizz Saldana is a 89 y.o. female presenting for    Follow up spot check, lentigines, SK on face.   Test treatment with cryo last visit. Per patient has improved   No new concerns     Hx: spots on the RT and LT cheeks  Time present: 3-4 years  Painful lesion: No  Itching lesion: No  Enlarging lesion: Yes, slowly over time  Anything make it better or worse? n/a    History of skin cancer: No  History of precancers/actinic keratoses: Yes  History of biopsies:No  History of blistering/severe sunburns:No  Family history of skin cancer:No  Family history of atypical moles:No      Past Medical History:   Diagnosis Date   • Arthritis 2015    left knee   • Bilateral cataracts     bilateral IOL   • Cancer (HCC) 1990    Breast    • DJD (degenerative joint disease)    • High cholesterol    • HTN    • HX: breast cancer    • Hyperlipidemia    • Osteoporosis    • Pain 7-2015    right knee   • Pigmented skin lesion 4/30/2015   • Postherpetic neuralgia    • Tubular adenoma        Current Outpatient Medications on File Prior to Visit   Medication Sig Dispense Refill   • gemfibrozil (LOPID) 600 MG Tab Take 1 Tab by mouth 2 times a day. 180 Tab 1   • lovastatin (MEVACOR) 20 MG Tab TAKE 1 TABLET DAILY 90 Tab 2   • felodipine (PLENDIL) 5 MG TABLET SR 24 HR TAKE 1 TABLET DAILY FOR BLOOD PRESSURE 90 Tab 1   • Cholecalciferol (VITAMIN D3) 2000 units Tab Take 2,000 Units by mouth every day.     • Multiple Vitamins-Calcium (ONE-A-DAY WOMENS PO) Take  by mouth every day.       No current facility-administered medications on file prior to visit.        Allergies   Allergen Reactions   • Codeine Nausea   • Floxin [Ofloxacin] Itching and Swelling   • Lidoderm      rash   • Methylprednisolone Sodium Succ Rash and Itching     Painful skin blistering LE   • Toradol Unspecified     .       Family History   Problem  Relation Age of Onset   • Hypertension Mother    • Other Father    • Other Sister         alzheimer       Social History     Socioeconomic History   • Marital status:      Spouse name: Not on file   • Number of children: 0   • Years of education: Not on file   • Highest education level: Not on file   Occupational History     Employer: RETIRED   Social Needs   • Financial resource strain: Not on file   • Food insecurity:     Worry: Not on file     Inability: Not on file   • Transportation needs:     Medical: Not on file     Non-medical: Not on file   Tobacco Use   • Smoking status: Never Smoker   • Smokeless tobacco: Never Used   Substance and Sexual Activity   • Alcohol use: No     Alcohol/week: 0.0 oz   • Drug use: No   • Sexual activity: Never     Partners: Male   Lifestyle   • Physical activity:     Days per week: Not on file     Minutes per session: Not on file   • Stress: Not on file   Relationships   • Social connections:     Talks on phone: Not on file     Gets together: Not on file     Attends [employer] service: Not on file     Active member of club or organization: Not on file     Attends meetings of clubs or organizations: Not on file     Relationship status: Not on file   • Intimate partner violence:     Fear of current or ex partner: Not on file     Emotionally abused: Not on file     Physically abused: Not on file     Forced sexual activity: Not on file   Other Topics Concern   • Not on file   Social History Narrative   • Not on file       Review of Systems   Constitutional: Negative for chills and fever.   Skin: Negative for itching and rash.   All other systems reviewed and are negative.       Objective:     A focused cutaneous exam was completed including: scalp, hair, ears, face, eyelids, conjunctiva, lips with the following pertinent findings listed below. Remaining above-listed examined areas within normal limits / negative for rashes or lesions.    There were no vitals taken for this  visit.    Physical Exam   Constitutional: She is oriented to person, place, and time and well-developed, well-nourished, and in no distress.   HENT:   Head: Normocephalic and atraumatic.       Eyes: Conjunctivae and lids are normal.   Neck: Normal range of motion.   Pulmonary/Chest: Effort normal.   Neurological: She is alert and oriented to person, place, and time.   Skin: Skin is warm and dry.   Psychiatric: Mood and affect normal.       DATA: none applicable to review    Assessment and Plan:     1. Lentigines  - Benign-appearing nature of lesions discussed. Advised to return to clinic for any new or concerning changes.    2. Macular seborrheic keratoses  - Benign-appearing nature of lesions discussed. Advised to return to clinic for any new or concerning changes.  - ABCDE's of melanoma discussed    Followup: Return in about 1 year (around 8/19/2020), or new/changing lesions.    Priscilla Ramos M.D.

## 2019-08-23 ENCOUNTER — APPOINTMENT (OUTPATIENT)
Dept: DERMATOLOGY | Facility: IMAGING CENTER | Age: 84
End: 2019-08-23

## 2019-09-17 ENCOUNTER — TELEPHONE (OUTPATIENT)
Dept: MEDICAL GROUP | Facility: PHYSICIAN GROUP | Age: 84
End: 2019-09-17

## 2019-09-17 DIAGNOSIS — E78.5 DYSLIPIDEMIA: ICD-10-CM

## 2019-09-17 NOTE — TELEPHONE ENCOUNTER
Pt's  called stating that the [facility] stopped Lovastatin since Gemfibrozil doesn't respond well to it? The pt is running low on Lovastatin and is requesting a refill.  Please advise.

## 2019-09-19 NOTE — TELEPHONE ENCOUNTER
Phone Number Called: [phone] (home)     Call outcome: spoke to patient regarding message below. [facility]. Can she continue to take Lovastatin in the mean time?

## 2019-09-20 RX ORDER — FENOFIBRATE 48 MG/1
48 TABLET, COATED ORAL DAILY
Qty: 90 TAB | Refills: 3 | Status: SHIPPED | OUTPATIENT
Start: 2019-09-20 | End: 2020-04-08 | Stop reason: SDUPTHER

## 2019-09-20 NOTE — TELEPHONE ENCOUNTER
I have sent Rx for Tricor to [facility]. Yes, she can continue to take lovastatin in the meantime.  Love Lassiter P.A.-C.

## 2019-09-25 ENCOUNTER — NON-PROVIDER VISIT (OUTPATIENT)
Dept: MEDICAL GROUP | Facility: PHYSICIAN GROUP | Age: 84
End: 2019-09-25
Payer: MEDICARE

## 2019-09-25 DIAGNOSIS — Z23 NEED FOR VACCINATION: ICD-10-CM

## 2019-09-25 PROCEDURE — 90662 IIV NO PRSV INCREASED AG IM: CPT | Performed by: FAMILY MEDICINE

## 2019-09-25 PROCEDURE — G0008 ADMIN INFLUENZA VIRUS VAC: HCPCS | Performed by: FAMILY MEDICINE

## 2019-09-25 NOTE — PROGRESS NOTES
"Lizz Saldana is a 89 y.o. female here for a non-provider visit for:   FLU    Reason for immunization: Annual Flu Vaccine  Immunization records indicate need for vaccine: Yes, confirmed with [facility]  Minimum interval has been met for this vaccine: Yes  ABN completed: Yes    Order and dose verified by: Dasha SCHUSTER Dated  8/15/19 was given to patient: Yes  All IAC Questionnaire questions were answered \"No.\"    Patient tolerated injection and no adverse effects were observed or reported: Yes    Pt scheduled for next dose in series: Not Indicated    "

## 2019-11-01 ENCOUNTER — APPOINTMENT (OUTPATIENT)
Dept: DERMATOLOGY | Facility: IMAGING CENTER | Age: 84
End: 2019-11-01

## 2019-11-07 ENCOUNTER — OFFICE VISIT (OUTPATIENT)
Dept: MEDICAL GROUP | Facility: PHYSICIAN GROUP | Age: 84
End: 2019-11-07
Payer: MEDICARE

## 2019-11-07 VITALS
BODY MASS INDEX: 19.83 KG/M2 | OXYGEN SATURATION: 95 % | HEIGHT: 61 IN | HEART RATE: 82 BPM | SYSTOLIC BLOOD PRESSURE: 128 MMHG | TEMPERATURE: 98.2 F | DIASTOLIC BLOOD PRESSURE: 62 MMHG | WEIGHT: 105 LBS

## 2019-11-07 DIAGNOSIS — M15.9 PRIMARY OSTEOARTHRITIS INVOLVING MULTIPLE JOINTS: ICD-10-CM

## 2019-11-07 DIAGNOSIS — E78.5 DYSLIPIDEMIA: ICD-10-CM

## 2019-11-07 DIAGNOSIS — B02.29 POSTHERPETIC NEURALGIA: ICD-10-CM

## 2019-11-07 DIAGNOSIS — M81.0 OSTEOPOROSIS, UNSPECIFIED OSTEOPOROSIS TYPE, UNSPECIFIED PATHOLOGICAL FRACTURE PRESENCE: ICD-10-CM

## 2019-11-07 DIAGNOSIS — I10 HTN (HYPERTENSION), BENIGN: ICD-10-CM

## 2019-11-07 PROCEDURE — 99214 OFFICE O/P EST MOD 30 MIN: CPT | Performed by: PHYSICIAN ASSISTANT

## 2019-11-07 RX ORDER — GEMFIBROZIL 600 MG/1
600 TABLET, FILM COATED ORAL 2 TIMES DAILY
COMMUNITY
End: 2019-11-07

## 2019-12-26 NOTE — TELEPHONE ENCOUNTER
*Medications is currently D/C'd on med list*  Was the patient seen in the last year in this department? Yes    Does patient have an active prescription for medications requested? No     Received Request Via: Pharmacy    Pt met protocol?: Yes     Last OV 11/07/19    Lab Results   Component Value Date/Time    CHOLSTRLTOT 180 01/03/2019 0938    TRIGLYCERIDE 64 01/03/2019 0938    HDL 62 01/03/2019 0938     (H) 01/03/2019 0938

## 2019-12-27 RX ORDER — GEMFIBROZIL 600 MG/1
TABLET, FILM COATED ORAL
Qty: 180 TAB | OUTPATIENT
Start: 2019-12-27

## 2020-01-15 ENCOUNTER — HOSPITAL ENCOUNTER (OUTPATIENT)
Dept: LAB | Facility: MEDICAL CENTER | Age: 85
End: 2020-01-15
Attending: PHYSICIAN ASSISTANT
Payer: MEDICARE

## 2020-01-15 DIAGNOSIS — E78.5 DYSLIPIDEMIA: ICD-10-CM

## 2020-01-15 DIAGNOSIS — I10 HTN (HYPERTENSION), BENIGN: ICD-10-CM

## 2020-01-15 LAB
ALBUMIN SERPL BCP-MCNC: 4.4 G/DL (ref 3.2–4.9)
ALBUMIN/GLOB SERPL: 1.6 G/DL
ALP SERPL-CCNC: 71 U/L (ref 30–99)
ALT SERPL-CCNC: 23 U/L (ref 2–50)
ANION GAP SERPL CALC-SCNC: 9 MMOL/L (ref 0–11.9)
AST SERPL-CCNC: 26 U/L (ref 12–45)
BILIRUB SERPL-MCNC: 0.8 MG/DL (ref 0.1–1.5)
BUN SERPL-MCNC: 20 MG/DL (ref 8–22)
CALCIUM SERPL-MCNC: 10.2 MG/DL (ref 8.5–10.5)
CHLORIDE SERPL-SCNC: 104 MMOL/L (ref 96–112)
CHOLEST SERPL-MCNC: 280 MG/DL (ref 100–199)
CO2 SERPL-SCNC: 27 MMOL/L (ref 20–33)
CREAT SERPL-MCNC: 0.63 MG/DL (ref 0.5–1.4)
FASTING STATUS PATIENT QL REPORTED: NORMAL
GLOBULIN SER CALC-MCNC: 2.7 G/DL (ref 1.9–3.5)
GLUCOSE SERPL-MCNC: 88 MG/DL (ref 65–99)
HDLC SERPL-MCNC: 85 MG/DL
LDLC SERPL CALC-MCNC: 172 MG/DL
POTASSIUM SERPL-SCNC: 3.6 MMOL/L (ref 3.6–5.5)
PROT SERPL-MCNC: 7.1 G/DL (ref 6–8.2)
SODIUM SERPL-SCNC: 140 MMOL/L (ref 135–145)
TRIGL SERPL-MCNC: 115 MG/DL (ref 0–149)

## 2020-01-15 PROCEDURE — 80053 COMPREHEN METABOLIC PANEL: CPT

## 2020-01-15 PROCEDURE — 36415 COLL VENOUS BLD VENIPUNCTURE: CPT

## 2020-01-15 PROCEDURE — 80061 LIPID PANEL: CPT

## 2020-02-07 DIAGNOSIS — I10 ESSENTIAL HYPERTENSION: ICD-10-CM

## 2020-02-07 NOTE — TELEPHONE ENCOUNTER
Was the patient seen in the last year in this department? Yes    Does patient have an active prescription for medications requested? No     Received Request Via: Pharmacy    Pt met protocol?: Yes     Last OV 11/07/2019    BP Readings from Last 1 Encounters:   11/07/19 128/62

## 2020-02-10 RX ORDER — FELODIPINE 5 MG/1
TABLET, EXTENDED RELEASE ORAL
Qty: 90 TAB | Refills: 1 | Status: SHIPPED | OUTPATIENT
Start: 2020-02-10 | End: 2020-08-04

## 2020-04-08 DIAGNOSIS — E78.5 DYSLIPIDEMIA: ICD-10-CM

## 2020-04-08 NOTE — TELEPHONE ENCOUNTER
Received request via: Patient    Was the patient seen in the last year in this department? Yes LOV 08/07/2019    Does the patient have an active prescription (recently filled or refills available) for medication(s) requested? No

## 2020-04-09 RX ORDER — FENOFIBRATE 48 MG/1
48 TABLET, COATED ORAL DAILY
Qty: 90 TAB | Refills: 1 | Status: SHIPPED | OUTPATIENT
Start: 2020-04-09 | End: 2020-04-13 | Stop reason: SDUPTHER

## 2020-04-09 NOTE — TELEPHONE ENCOUNTER
*Change in pharmacy*  See MA's notes below, pt has met protocol, OV 11/07/2019    Lab Results   Component Value Date/Time    CHOLSTRLTOT 280 (H) 01/15/2020 0917    TRIGLYCERIDE 115 01/15/2020 0917    HDL 85 01/15/2020 0917     (H) 01/15/2020 0917

## 2020-04-13 DIAGNOSIS — E78.5 DYSLIPIDEMIA: ICD-10-CM

## 2020-04-13 NOTE — TELEPHONE ENCOUNTER
Received request via: Patient    Was the patient seen in the last year in this department? Yes LOV 11/07/2019    Does the patient have an active prescription (recently filled or refills available) for medication(s) requested? No     PHARMACY CHANGE

## 2020-04-14 RX ORDER — FENOFIBRATE 48 MG/1
48 TABLET, COATED ORAL DAILY
Qty: 90 TAB | Refills: 1 | Status: SHIPPED | OUTPATIENT
Start: 2020-04-14 | End: 2020-11-23 | Stop reason: SDUPTHER

## 2020-04-14 NOTE — TELEPHONE ENCOUNTER
*CHANGE IN PHARMACY*  Was the patient seen in the last year in this department? Yes    Does patient have an active prescription for medications requested? YES    Received Request Via: Pharmacy      Pt met protocol?: Yes    LAST OV 11/07/2019    Lab Results   Component Value Date/Time    CHOLSTRLTOT 280 (H) 01/15/2020 09:17 AM     (H) 01/15/2020 09:17 AM    HDL 85 01/15/2020 09:17 AM    TRIGLYCERIDE 115 01/15/2020 09:17 AM       Lab Results   Component Value Date/Time    SODIUM 140 01/15/2020 09:17 AM    POTASSIUM 3.6 01/15/2020 09:17 AM    CHLORIDE 104 01/15/2020 09:17 AM    CO2 27 01/15/2020 09:17 AM    GLUCOSE 88 01/15/2020 09:17 AM    BUN 20 01/15/2020 09:17 AM    CREATININE 0.63 01/15/2020 09:17 AM    CREATININE 0.81 02/17/2010 10:19 AM    BUNCREATRAT 23 02/17/2010 10:19 AM    GLOMRATE >59 02/17/2010 10:19 AM     Lab Results   Component Value Date/Time    ALKPHOSPHAT 71 01/15/2020 09:17 AM    ASTSGOT 26 01/15/2020 09:17 AM    ALTSGPT 23 01/15/2020 09:17 AM    TBILIRUBIN 0.8 01/15/2020 09:17 AM

## 2020-06-12 ENCOUNTER — OFFICE VISIT (OUTPATIENT)
Dept: MEDICAL GROUP | Facility: PHYSICIAN GROUP | Age: 85
End: 2020-06-12
Payer: MEDICARE

## 2020-06-12 VITALS
WEIGHT: 105 LBS | HEART RATE: 94 BPM | OXYGEN SATURATION: 94 % | TEMPERATURE: 97.8 F | BODY MASS INDEX: 19.83 KG/M2 | HEIGHT: 61 IN | SYSTOLIC BLOOD PRESSURE: 144 MMHG | DIASTOLIC BLOOD PRESSURE: 82 MMHG

## 2020-06-12 DIAGNOSIS — Z00.00 MEDICARE ANNUAL WELLNESS VISIT, SUBSEQUENT: ICD-10-CM

## 2020-06-12 DIAGNOSIS — Z85.3 HX: BREAST CANCER: ICD-10-CM

## 2020-06-12 DIAGNOSIS — I10 HTN (HYPERTENSION), BENIGN: ICD-10-CM

## 2020-06-12 DIAGNOSIS — D36.9 TUBULAR ADENOMA: ICD-10-CM

## 2020-06-12 DIAGNOSIS — R32 URINARY INCONTINENCE, UNSPECIFIED TYPE: ICD-10-CM

## 2020-06-12 DIAGNOSIS — B02.29 POSTHERPETIC NEURALGIA: ICD-10-CM

## 2020-06-12 DIAGNOSIS — H35.30 MACULAR DEGENERATION, UNSPECIFIED LATERALITY, UNSPECIFIED TYPE: ICD-10-CM

## 2020-06-12 DIAGNOSIS — M15.9 PRIMARY OSTEOARTHRITIS INVOLVING MULTIPLE JOINTS: ICD-10-CM

## 2020-06-12 DIAGNOSIS — E78.5 DYSLIPIDEMIA: ICD-10-CM

## 2020-06-12 DIAGNOSIS — M81.0 OSTEOPOROSIS, UNSPECIFIED OSTEOPOROSIS TYPE, UNSPECIFIED PATHOLOGICAL FRACTURE PRESENCE: ICD-10-CM

## 2020-06-12 PROCEDURE — G0439 PPPS, SUBSEQ VISIT: HCPCS | Performed by: PHYSICIAN ASSISTANT

## 2020-06-12 RX ORDER — LOVASTATIN 20 MG/1
TABLET ORAL
Qty: 90 TAB | Refills: 3 | Status: SHIPPED | OUTPATIENT
Start: 2020-06-12 | End: 2021-03-04 | Stop reason: SDUPTHER

## 2020-06-12 RX ORDER — GEMFIBROZIL 600 MG/1
600 TABLET, FILM COATED ORAL 2 TIMES DAILY
COMMUNITY
End: 2020-06-12

## 2020-06-12 ASSESSMENT — ACTIVITIES OF DAILY LIVING (ADL): BATHING_REQUIRES_ASSISTANCE: 0

## 2020-06-12 ASSESSMENT — PATIENT HEALTH QUESTIONNAIRE - PHQ9: CLINICAL INTERPRETATION OF PHQ2 SCORE: 0

## 2020-06-12 ASSESSMENT — ENCOUNTER SYMPTOMS: GENERAL WELL-BEING: EXCELLENT

## 2020-06-12 NOTE — ASSESSMENT & PLAN NOTE
Has history of adenomatous polyps on prior colonoscopy but no longer undergoing screenings due to advanced age.

## 2020-06-12 NOTE — ASSESSMENT & PLAN NOTE
Established problem, stable per patient. Takes Ocuvite Preservision supplement. She follows regularly with eye doctor whom she will continue to follow.

## 2020-06-12 NOTE — ASSESSMENT & PLAN NOTE
Established problem, stable per patient. Not overly bothersome, no current medication. Will continue to monitor.

## 2020-06-12 NOTE — PROGRESS NOTES
Chief Complaint   Patient presents with   • Annual Wellness Visit     HC AWV         HPI:  Lizz is a 89 y.o. here for Medicare Annual Wellness Visit. Is an established patient of mine.    HC AWV    Patient Active Problem List    Diagnosis Date Noted   • Macular degeneration 09/07/2012   • Osteoporosis 09/09/2011   • HTN (hypertension), benign 12/17/2009   • Dyslipidemia    • DJD (degenerative joint disease)    • HX: breast cancer    • Tubular adenoma    • Postherpetic neuralgia    • Bladder incontinence        Current Outpatient Medications   Medication Sig Dispense Refill   • Multiple Vitamins-Minerals (OCUVITE PRESERVISION PO) Take 1 Tab by mouth 2 Times a Day.     • lovastatin (MEVACOR) 20 MG Tab TAKE 1 TABLET DAILY 90 Tab 3   • fenofibrate (TRICOR) 48 MG Tab Take 1 Tab by mouth every day. 90 Tab 1   • felodipine (PLENDIL) 5 MG TABLET SR 24 HR TAKE 1 TABLET DAILY FOR BLOOD PRESSURE 90 Tab 1   • Cholecalciferol (VITAMIN D3) 2000 units Tab Take 2,000 Units by mouth every day.     • Multiple Vitamins-Calcium (ONE-A-DAY WOMENS PO) Take  by mouth every day.       No current facility-administered medications for this visit.         Patient is taking medications as noted in medication list.  Current supplements as per medication list.     Allergies: Codeine; Floxin [ofloxacin]; Lidoderm; Methylprednisolone sodium succ; and Toradol    Current social contact/activities: volunteer at the [employer]     Is patient current with immunizations? No, due for TDAP and SHINGRIX (Shingles). Patient is interested in receiving NONE today.    She  reports that she has never smoked. She has never used smokeless tobacco. She reports that she does not drink alcohol or use drugs.  Counseling given: Not Answered        DPA/Advanced directive: Patient has Advanced Directive on file.     ROS:    Gait: Uses no assistive device   Ostomy: No   Other tubes: No   Amputations: No   Chronic oxygen use No   Last eye exam 3/2019   Wears hearing  aids: No   : Denies any urinary leakage during the last 6 months      Screening:        Depression Screening    Little interest or pleasure in doing things?  0 - not at all  Feeling down, depressed, or hopeless? 0 - not at all  Patient Health Questionnaire Score: 0    If depressive symptoms identified deferred to follow up visit unless specifically addressed in assessment and plan.    Interpretation of PHQ-9 Total Score   Score Severity   1-4 No Depression   5-9 Mild Depression   10-14 Moderate Depression   15-19 Moderately Severe Depression   20-27 Severe Depression    Screening for Cognitive Impairment    Three Minute Recall (village, kitchen, baby)  1/3    Tesfaye clock face with all 12 numbers and set the hands to show 10 past 10.  No 4/5  If cognitive concerns identified, deferred for follow up unless specifically addressed in assessment and plan.    Fall Risk Assessment    Has the patient had two or more falls in the last year or any fall with injury in the last year?  No  If fall risk identified, deferred for follow up unless specifically addressed in assessment and plan.    Safety Assessment    Throw rugs on floor.  No  Handrails on all stairs.  Yes  Good lighting in all hallways.  Yes  Difficulty hearing.  No  Patient counseled about all safety risks that were identified.    Functional Assessment ADLs    Are there any barriers preventing you from cooking for yourself or meeting nutritional needs?  No.    Are there any barriers preventing you from driving safely or obtaining transportation?  No.    Are there any barriers preventing you from using a telephone or calling for help?  No.    Are there any barriers preventing you from shopping?  No.    Are there any barriers preventing you from taking care of your own finances?  No.    Are there any barriers preventing you from managing your medications?  No.    Are there any barriers preventing you from showering, bathing or dressing yourself?  No.    Are you  currently engaging in any exercise or physical activity?  Yes.  Bicycle, yard work   What is your perception of your health?  Excellent.    Health Maintenance Summary                IMM DTaP/Tdap/Td Vaccine Overdue 8/4/1946      Done 7/28/2009 Imm Admin: TD Vaccine    IMM ZOSTER VACCINES Overdue 8/4/1977     Annual Wellness Visit Overdue 4/15/2020      Done 4/15/2019 Visit Dx: Medicare annual wellness visit, subsequent     Patient has more history with this topic...    MAMMOGRAM Next Due 11/4/2020      Done 11/4/2019 [id number]     Patient has more history with this topic...          Patient Care Team:  Love Lassiter P.A.-C. as PCP - General (Physician Assistant)  Ashish Serrano M.D. (Orthopaedics)    Social History     Tobacco Use   • Smoking status: Never Smoker   • Smokeless tobacco: Never Used   Substance Use Topics   • Alcohol use: No     Alcohol/week: 0.0 oz   • Drug use: No     Family History   Problem Relation Age of Onset   • Hypertension Mother    • Other Father    • Other Sister         alzheimer     She  has a past medical history of Arthritis (2015), Bilateral cataracts, Cancer (HCC) (1990), DJD (degenerative joint disease), High cholesterol, HTN, breast cancer, Hyperlipidemia, Osteoporosis, Pain (7-2015), Pigmented skin lesion (4/30/2015), Postherpetic neuralgia, and Tubular adenoma. She also has no past medical history of Encounter for long-term (current) use of other medications.   Past Surgical History:   Procedure Laterality Date   • KNEE MANIPULATION Right 10/12/2015    Procedure: KNEE MANIPULATION;  Surgeon: Ashish Serrano M.D.;  Location: SURGERY [facility];  Service:    • KNEE ARTHROPLASTY TOTAL Right 7/13/2015    Procedure: KNEE ARTHROPLASTY TOTAL;  Surgeon: Ashish Serrano M.D.;  Location: SURGERY [facility];  Service:    • CATARACT EXTRACTION WITH IOL  2000    Bilateral   • ROTATOR CUFF REPAIR  1990    Left shoulder   • MASTECTOMY  1990    Left   •  "CYST EXCISION Left 1989    ganglion cyst removal, wrist   • HYSTERECTOMY, TOTAL ABDOMINAL  1970           Exam:     /82 (BP Location: Right arm, Patient Position: Sitting, BP Cuff Size: Adult)   Pulse 94   Temp 36.6 °C (97.8 °F)   Ht 1.549 m (5' 1\")   Wt 47.6 kg (105 lb)   SpO2 94%  Body mass index is 19.84 kg/m².    Hearing good.    Dentition good  Alert, oriented in no acute distress.  Eye contact is good, speech goal directed, affect calm      Assessment and Plan. The following treatment and monitoring plan is recommended:      Tubular Adenoma  Has history of adenomatous polyps on prior colonoscopy but no longer undergoing screenings due to advanced age.    Postherpetic Neuralgia  Established problem, stable per patient. Not overly bothersome, no current medication. Will continue to monitor.    Osteoporosis  Established problem, stable. Previously completed treated with Fosamax and Prolia. Advised to continue calcium/vitamin D supplement.    Macular degeneration  Established problem, stable per patient. Takes Ocuvite Preservision supplement. She follows regularly with eye doctor whom she will continue to follow.    Hx: Breast Cancer  Has history of L breast cancer. S/p unilateral mastectomy and chemotherapy. She is compliant with annual screening mammograms of remaining R breast, last done in 11/2019. Continue current management.    HTN (hypertension), benign  Established problem, well-controlled with felodipine 5 mg daily. BP today in the office is 144/82 which is acceptable for age. Continue current management.    Dyslipidemia  Established problem, well-controlled with medication. Is prescribed fenofibrate 48 mg daily and lovastatin 20 mg daily but it appears she has been taking just fenofibrate and not lovastatin. In addition, believes she's been taking gemfibrozil 600 mg BID--a prescription that was previously discontinued. Last lipid profile in 1/2020 reviewed. Shows elevated TC and LDL but high " HDL of 85. Recommend she re-start the lovastatin. I have refilled this for her.    DJD (degenerative joint disease)  Established problem, stable. Undergoes periodic steroid injection of L knee. Has h/o prior R TKA in 2015. She will continue to follow with orthopedics.    Bladder Incontinence  Established problem, stable per patient. Not overly bothered by symptoms. Will continue to monitor.        Services suggested: No services needed at this time  Health Care Screening recommendations as per orders if indicated.  Referrals offered: PT/OT/Nutrition counseling/Behavioral Health/Smoking cessation as per orders if indicated.    Discussion today about general wellness and lifestyle habits:    · Prevent falls and reduce trip hazards; Cautioned about securing or removing rugs.  · Have a working fire alarm and carbon monoxide detector;   · Engage in regular physical activity and social activities       Follow-up: Return in about 1 year (around 6/12/2021).     Love Lassiter P.A.-C.

## 2020-06-12 NOTE — ASSESSMENT & PLAN NOTE
Established problem, well-controlled with medication. Is prescribed fenofibrate 48 mg daily and lovastatin 20 mg daily but it appears she has been taking just fenofibrate and not lovastatin. In addition, believes she's been taking gemfibrozil 600 mg BID--a prescription that was previously discontinued. Last lipid profile in 1/2020 reviewed. Shows elevated TC and LDL but high HDL of 85. Recommend she re-start the lovastatin. I have refilled this for her.

## 2020-06-12 NOTE — ASSESSMENT & PLAN NOTE
Has history of L breast cancer. S/p unilateral mastectomy and chemotherapy. She is compliant with annual screening mammograms of remaining R breast, last done in 11/2019. Continue current management.

## 2020-06-12 NOTE — ASSESSMENT & PLAN NOTE
Established problem, stable. Undergoes periodic steroid injection of L knee. Has h/o prior R TKA in 2015. She will continue to follow with orthopedics.

## 2020-06-12 NOTE — ASSESSMENT & PLAN NOTE
Established problem, stable. Previously completed treated with Fosamax and Prolia. Advised to continue calcium/vitamin D supplement.

## 2020-06-12 NOTE — ASSESSMENT & PLAN NOTE
Established problem, stable per patient. Not overly bothered by symptoms. Will continue to monitor.

## 2020-06-12 NOTE — ASSESSMENT & PLAN NOTE
Established problem, well-controlled with felodipine 5 mg daily. BP today in the office is 144/82 which is acceptable for age. Continue current management.

## 2020-08-04 DIAGNOSIS — I10 ESSENTIAL HYPERTENSION: ICD-10-CM

## 2020-08-04 RX ORDER — FELODIPINE 5 MG/1
TABLET, EXTENDED RELEASE ORAL
Qty: 90 TAB | Refills: 0 | Status: SHIPPED | OUTPATIENT
Start: 2020-08-04 | End: 2020-11-11 | Stop reason: SDUPTHER

## 2020-09-08 ENCOUNTER — APPOINTMENT (OUTPATIENT)
Dept: DERMATOLOGY | Facility: IMAGING CENTER | Age: 85
End: 2020-09-08

## 2020-10-02 ENCOUNTER — APPOINTMENT (OUTPATIENT)
Dept: DERMATOLOGY | Facility: IMAGING CENTER | Age: 85
End: 2020-10-02

## 2020-10-02 ENCOUNTER — NON-PROVIDER VISIT (OUTPATIENT)
Dept: MEDICAL GROUP | Facility: PHYSICIAN GROUP | Age: 85
End: 2020-10-02
Payer: MEDICARE

## 2020-10-02 DIAGNOSIS — Z23 NEED FOR VACCINATION: ICD-10-CM

## 2020-10-02 PROCEDURE — 90662 IIV NO PRSV INCREASED AG IM: CPT | Performed by: PHYSICIAN ASSISTANT

## 2020-10-02 PROCEDURE — G0008 ADMIN INFLUENZA VIRUS VAC: HCPCS | Performed by: PHYSICIAN ASSISTANT

## 2020-10-02 NOTE — PROGRESS NOTES
"Lizz Saldana is a 89 y.o. female here for a non-provider visit for:   FLU    Reason for immunization: Annual Flu Vaccine  Immunization records indicate need for vaccine: Yes, confirmed with Epic  Minimum interval has been met for this vaccine: Yes  ABN completed: Not Indicated    Order and dose verified by: Melissa SCHUSTER was given to patient: Yes  All IAC Questionnaire questions were answered \"No.\"    Patient tolerated injection and no adverse effects were observed or reported: Yes    Pt scheduled for next dose in series: Not Indicated  "

## 2020-11-11 DIAGNOSIS — I10 ESSENTIAL HYPERTENSION: ICD-10-CM

## 2020-11-12 RX ORDER — FELODIPINE 5 MG/1
5 TABLET, EXTENDED RELEASE ORAL DAILY
Qty: 90 TAB | Refills: 1 | Status: SHIPPED | OUTPATIENT
Start: 2020-11-12 | End: 2020-11-23 | Stop reason: SDUPTHER

## 2020-11-23 DIAGNOSIS — E78.5 DYSLIPIDEMIA: ICD-10-CM

## 2020-11-23 DIAGNOSIS — I10 ESSENTIAL HYPERTENSION: ICD-10-CM

## 2020-11-23 RX ORDER — FELODIPINE 5 MG/1
5 TABLET, EXTENDED RELEASE ORAL DAILY
Qty: 90 TAB | Refills: 1 | Status: SHIPPED | OUTPATIENT
Start: 2020-11-23 | End: 2021-02-25

## 2020-11-23 RX ORDER — FENOFIBRATE 48 MG/1
48 TABLET, COATED ORAL DAILY
Qty: 90 TAB | Refills: 1 | Status: SHIPPED | OUTPATIENT
Start: 2020-11-23 | End: 2020-12-21

## 2020-12-21 ENCOUNTER — OFFICE VISIT (OUTPATIENT)
Dept: MEDICAL GROUP | Facility: PHYSICIAN GROUP | Age: 85
End: 2020-12-21
Payer: MEDICARE

## 2020-12-21 VITALS
OXYGEN SATURATION: 94 % | SYSTOLIC BLOOD PRESSURE: 164 MMHG | WEIGHT: 150.1 LBS | TEMPERATURE: 98.3 F | DIASTOLIC BLOOD PRESSURE: 72 MMHG | HEART RATE: 97 BPM | BODY MASS INDEX: 28.34 KG/M2 | HEIGHT: 61 IN

## 2020-12-21 DIAGNOSIS — Z23 NEED FOR VACCINATION: ICD-10-CM

## 2020-12-21 DIAGNOSIS — E78.5 DYSLIPIDEMIA: ICD-10-CM

## 2020-12-21 DIAGNOSIS — I10 HTN (HYPERTENSION), BENIGN: ICD-10-CM

## 2020-12-21 PROCEDURE — 99204 OFFICE O/P NEW MOD 45 MIN: CPT | Mod: 25 | Performed by: INTERNAL MEDICINE

## 2020-12-21 PROCEDURE — 90471 IMMUNIZATION ADMIN: CPT | Performed by: INTERNAL MEDICINE

## 2020-12-21 PROCEDURE — 90715 TDAP VACCINE 7 YRS/> IM: CPT | Performed by: INTERNAL MEDICINE

## 2020-12-21 RX ORDER — AMOXICILLIN 500 MG/1
CAPSULE ORAL
COMMUNITY
End: 2021-06-21

## 2020-12-21 NOTE — PROGRESS NOTES
New Patient to establish    Chief Complaint   Patient presents with   • Establish Care       Subjective:     History of Present Illness: Patient is a 89 y.o. female who is here today to establish primary care      2. Dyslipidemia  3. HTN (hypertension), benign  Chronic, today blood pressure is high, patient simply check blood pressure at home with normal range, mention is compliant with felodipine 5 mg daily, as well as lovastatin 20 mg daily follow-up, denies related symptoms    >> She is also taking amoxicillin as needed when she see dentist for primary prevention of knee joint secondary to arthroplasty        Current Outpatient Medications on File Prior to Visit   Medication Sig Dispense Refill   • amoxicillin (AMOXIL) 500 MG Cap amoxicillin 500 mg capsule     • felodipine (PLENDIL) 5 MG TABLET SR 24 HR Take 1 Tab by mouth every day. FOR BLOOD PRESSURE 90 Tab 1   • Multiple Vitamins-Minerals (OCUVITE PRESERVISION PO) Take 1 Tab by mouth 2 Times a Day. PreserVision AREDS 2     • lovastatin (MEVACOR) 20 MG Tab TAKE 1 TABLET DAILY 90 Tab 3   • Cholecalciferol (VITAMIN D3) 2000 units Tab Take 5,000 Units by mouth every day.     • Multiple Vitamins-Calcium (ONE-A-DAY WOMENS PO) Take  by mouth every day.     • diclofenac sodium 1 % Gel diclofenac 1 % topical gel     • mupirocin (BACTROBAN) 2 % Ointment mupirocin 2 % topical ointment       No current facility-administered medications on file prior to visit.      Allergies   Allergen Reactions   • Codeine Nausea   • Floxin [Ofloxacin] Itching and Swelling   • Lidoderm      rash   • Methylprednisolone Sodium Succ Rash and Itching     Painful skin blistering LE   • Toradol Unspecified     .     Patient Active Problem List    Diagnosis Date Noted   • Macular degeneration 09/07/2012   • Osteoporosis 09/09/2011   • HTN (hypertension), benign 12/17/2009   • Dyslipidemia    • DJD (degenerative joint disease)    • HX: breast cancer    • Tubular adenoma    • Postherpetic  neuralgia    • Bladder incontinence      Past Medical History:   Diagnosis Date   • Arthritis 2015    left knee   • Bilateral cataracts     bilateral IOL   • Cancer (HCC) 1990    Breast    • DJD (degenerative joint disease)    • High cholesterol    • HTN    • HX: breast cancer    • Hyperlipidemia    • Osteoporosis    • Pain 7-2015    right knee   • Pigmented skin lesion 4/30/2015   • Postherpetic neuralgia    • Tubular adenoma      Past Surgical History:   Procedure Laterality Date   • KNEE MANIPULATION Right 10/12/2015    Procedure: KNEE MANIPULATION;  Surgeon: Ashish Serrano M.D.;  Location: SURGERY [facility];  Service:    • KNEE ARTHROPLASTY TOTAL Right 7/13/2015    Procedure: KNEE ARTHROPLASTY TOTAL;  Surgeon: Ashish Serrano M.D.;  Location: SURGERY [facility];  Service:    • CATARACT EXTRACTION WITH IOL  2000    Bilateral   • ROTATOR CUFF REPAIR  1990    Left shoulder   • MASTECTOMY  1990    Left   • CYST EXCISION Left 1989    ganglion cyst removal, wrist   • HYSTERECTOMY, TOTAL ABDOMINAL  1970     Family History   Problem Relation Age of Onset   • Hypertension Mother    • Other Father    • Other Sister         alzheimer     Social History     Tobacco Use   • Smoking status: Never Smoker   • Smokeless tobacco: Never Used   Substance Use Topics   • Alcohol use: No     Alcohol/week: 0.0 oz   • Drug use: No         ROS:     - Constitutional: Negative for fever, chills,    - Eye: Negative for blurry vision    -ENT: Negative for ear pain    - Respiratory: Negative for cough, hemoptysis    - Cardiovascular: Negative for chest pain     - Gastrointestinal: Negative for abdominal pain    - Genitourinary: Negative for dysuria    - Musculoskeletal: Negative for joint swelling    - Skin: Negative for itching    - Neurological: Negative for focal weakness     - Psychiatric/Behavioral: Negative for depression        Physical Exam:     BP (!) 164/72 (BP Location: Left arm, Patient Position: Sitting, BP  "Cuff Size: Adult)   Pulse 97   Temp 36.8 °C (98.3 °F) (Temporal)   Ht 1.549 m (5' 1\")   Wt 68.1 kg (150 lb 1.6 oz)   SpO2 94%   BMI 28.36 kg/m²   General: Normal appearing. No distress.  ENT: oropharynx without exudates.    Eyes: conjunctiva clear lids without ptosis  Pulmonary: Clear to ausculation.  Normal effort.   Cardiovascular: Regular rate and rhythm  Abdomen: Soft, nontender,  Lymph: No cervical or supraclavicular palpable lymph nodes  Psych: Normal mood and affect.     I have reviewed pertinent labs and diagnostic tests associated with this visit with specific comments listed under the assessment and plan below      Assessment and Plan:     1. Need for vaccination  - Tdap Vaccine =>8YO IM    2. Dyslipidemia  3. HTN (hypertension), benign  Continue blood pressure medication as above, continue statin    -discussion in details on target blood pressure goal, advised monitoring BP closely at home.  Have BP log to present at follow-up visit or send through my chart.   -Advised low-salt diet, healthy dietary option include plenty of vegetable, reduce carb and sugar, regular exercise as tolerated, healthy fat/protein, also avoid alcohol, no NSAIDs  If symptoms worsen or persist patient can return to clinic for reevaluation.  Red flags and strict emergency room precautions discussed.  Discussed side effects of medication. Patient confirmed understanding of information.        Follow Up:      Return in about 4 weeks (around 1/18/2021) for follow up, Hypertension management.       Please note that this dictation was created using voice recognition software. I have made every reasonable attempt to correct obvious errors, but I expect that there are errors of grammar and possibly content that I did not discover before finalizing the note.    Signed by: Cristofer Cleveland M.D.    "

## 2021-01-11 DIAGNOSIS — Z23 NEED FOR VACCINATION: ICD-10-CM

## 2021-02-06 ENCOUNTER — APPOINTMENT (OUTPATIENT)
Dept: FAMILY PLANNING/WOMEN'S HEALTH CLINIC | Facility: IMMUNIZATION CENTER | Age: 86
End: 2021-02-06
Attending: INTERNAL MEDICINE
Payer: MEDICARE

## 2021-02-06 DIAGNOSIS — Z23 NEED FOR VACCINATION: ICD-10-CM

## 2021-02-06 PROCEDURE — 0011A MODERNA SARS-COV-2 VACCINE: CPT

## 2021-02-06 PROCEDURE — 91301 MODERNA SARS-COV-2 VACCINE: CPT

## 2021-02-08 ENCOUNTER — IMMUNIZATION (OUTPATIENT)
Dept: FAMILY PLANNING/WOMEN'S HEALTH CLINIC | Facility: IMMUNIZATION CENTER | Age: 86
End: 2021-02-08
Payer: MEDICARE

## 2021-02-08 DIAGNOSIS — Z23 ENCOUNTER FOR VACCINATION: Primary | ICD-10-CM

## 2021-02-25 ENCOUNTER — OFFICE VISIT (OUTPATIENT)
Dept: MEDICAL GROUP | Facility: PHYSICIAN GROUP | Age: 86
End: 2021-02-25
Payer: MEDICARE

## 2021-02-25 VITALS
SYSTOLIC BLOOD PRESSURE: 152 MMHG | WEIGHT: 107.7 LBS | OXYGEN SATURATION: 94 % | TEMPERATURE: 98.2 F | HEART RATE: 87 BPM | BODY MASS INDEX: 20.33 KG/M2 | HEIGHT: 61 IN | DIASTOLIC BLOOD PRESSURE: 58 MMHG

## 2021-02-25 DIAGNOSIS — I10 HTN (HYPERTENSION), BENIGN: ICD-10-CM

## 2021-02-25 DIAGNOSIS — E78.5 DYSLIPIDEMIA: ICD-10-CM

## 2021-02-25 DIAGNOSIS — R73.01 IMPAIRED FASTING GLUCOSE: ICD-10-CM

## 2021-02-25 PROCEDURE — 99214 OFFICE O/P EST MOD 30 MIN: CPT | Performed by: INTERNAL MEDICINE

## 2021-02-25 RX ORDER — FELODIPINE 10 MG/1
10 TABLET, EXTENDED RELEASE ORAL DAILY
Qty: 90 TABLET | Refills: 1 | Status: SHIPPED | OUTPATIENT
Start: 2021-02-25 | End: 2021-04-29 | Stop reason: SDUPTHER

## 2021-02-25 ASSESSMENT — PATIENT HEALTH QUESTIONNAIRE - PHQ9: CLINICAL INTERPRETATION OF PHQ2 SCORE: 0

## 2021-02-25 NOTE — PROGRESS NOTES
Established Patient    Chief Complaint   Patient presents with   • Follow-Up       Subjective:     HPI:   Lizz presents today with the following.    1. HTN (hypertension), benign  2. Dyslipidemia  Patient check blood pressure at home with normal range, few times systolic blood pressure in 140s, denied having related symptoms, patient she is compliant with felodipine 5 mg daily SR, she is also taking lovastatin 20 mg daily, otherwise denied having other symptoms    Patient Active Problem List    Diagnosis Date Noted   • Macular degeneration 09/07/2012   • Osteoporosis 09/09/2011   • HTN (hypertension), benign 12/17/2009   • Dyslipidemia    • DJD (degenerative joint disease)    • HX: breast cancer    • Tubular adenoma    • Postherpetic neuralgia    • Bladder incontinence        Current Outpatient Medications on File Prior to Visit   Medication Sig Dispense Refill   • felodipine (PLENDIL) 5 MG TABLET SR 24 HR Take 1 Tab by mouth every day. FOR BLOOD PRESSURE 90 Tab 1   • Multiple Vitamins-Minerals (OCUVITE PRESERVISION PO) Take 1 Tab by mouth 2 Times a Day. PreserVision AREDS 2     • lovastatin (MEVACOR) 20 MG Tab TAKE 1 TABLET DAILY 90 Tab 3   • Cholecalciferol (VITAMIN D3) 2000 units Tab Take 5,000 Units by mouth every day.     • Multiple Vitamins-Calcium (ONE-A-DAY WOMENS PO) Take  by mouth every day.     • amoxicillin (AMOXIL) 500 MG Cap amoxicillin 500 mg capsule     • diclofenac sodium 1 % Gel diclofenac 1 % topical gel     • mupirocin (BACTROBAN) 2 % Ointment mupirocin 2 % topical ointment       No current facility-administered medications on file prior to visit.       Allergies, past medical history, past surgical history, family history, social history reviewed and updated    ROS:  All other systems reviewed and are negative except as stated in the HPI     Physical Exam:     BP (!) 164/70 (BP Location: Left arm, Patient Position: Sitting, BP Cuff Size: Adult)   Pulse 87   Temp 36.8 °C (98.2 °F) (Temporal)    "Ht 1.549 m (5' 1\")   Wt 48.9 kg (107 lb 11.2 oz)   SpO2 94%   BMI 20.35 kg/m²   General: Normal appearing. No distress.  ENT: oropharynx without exudates.    Eyes: conjunctiva clear lids without ptosis  Pulmonary: Clear to ausculation.  Normal effort.   Cardiovascular: Regular rate and rhythm  Abdomen: Soft, nontender,  Lymph: No cervical or supraclavicular palpable lymph nodes  Psych: Normal mood and affect.     I have reviewed pertinent labs and diagnostic tests associated with this visit with specific comments listed under the assessment and plan below      Assessment and Plan:     89 y.o. female with the following issues.    1. HTN (hypertension), benign  2. Dyslipidemia  - Comp Metabolic Panel; Future  - MAGNESIUM; Future  - VITAMIN D,25 HYDROXY; Future  - VITAMIN B12; Future  - TSH WITH REFLEX TO FT4; Future  - Lipid Profile; Future  >> Change felodipine from 5 mg to 10 mg as blood pressure was consistently high in the office at the end of the visit as well with 150 systolic    -discussion in details on target blood pressure goal, advised monitoring BP closely at home.  Have BP log to present at follow-up visit or send through my chart.   -Advised low-salt diet, healthy dietary option include plenty of vegetable, reduce carb and sugar, regular exercise as tolerated, healthy fat/protein, also avoid alcohol, no NSAIDs      3. Impaired fasting glucose  - HEMOGLOBIN A1C; Future      Follow Up:      Return in about 3 months (around 5/25/2021) for follow up.  Labs, hypertension,    Please note that this dictation was created using voice recognition software. I have made every reasonable attempt to correct obvious errors, but I expect that there are errors of grammar and possibly content that I did not discover before finalizing the note.    Signed by: Cristofer Cleveland M.D.    "

## 2021-03-04 DIAGNOSIS — E78.5 DYSLIPIDEMIA: ICD-10-CM

## 2021-03-04 RX ORDER — LOVASTATIN 20 MG/1
TABLET ORAL
Qty: 90 TABLET | Refills: 0 | Status: SHIPPED | OUTPATIENT
Start: 2021-03-04 | End: 2021-08-25 | Stop reason: SDUPTHER

## 2021-03-05 ENCOUNTER — IMMUNIZATION (OUTPATIENT)
Dept: FAMILY PLANNING/WOMEN'S HEALTH CLINIC | Facility: IMMUNIZATION CENTER | Age: 86
End: 2021-03-05
Attending: INTERNAL MEDICINE
Payer: MEDICARE

## 2021-03-05 DIAGNOSIS — Z23 ENCOUNTER FOR VACCINATION: Primary | ICD-10-CM

## 2021-03-05 PROCEDURE — 0012A MODERNA SARS-COV-2 VACCINE: CPT | Performed by: INTERNAL MEDICINE

## 2021-03-05 PROCEDURE — 91301 MODERNA SARS-COV-2 VACCINE: CPT | Performed by: INTERNAL MEDICINE

## 2021-04-29 DIAGNOSIS — I10 HTN (HYPERTENSION), BENIGN: ICD-10-CM

## 2021-04-29 RX ORDER — FELODIPINE 10 MG/1
10 TABLET, EXTENDED RELEASE ORAL DAILY
Qty: 90 TABLET | Refills: 0 | Status: SHIPPED | OUTPATIENT
Start: 2021-04-29 | End: 2021-07-08 | Stop reason: SDUPTHER

## 2021-06-07 ENCOUNTER — HOSPITAL ENCOUNTER (OUTPATIENT)
Dept: LAB | Facility: MEDICAL CENTER | Age: 86
End: 2021-06-07
Attending: INTERNAL MEDICINE
Payer: MEDICARE

## 2021-06-07 DIAGNOSIS — R73.01 IMPAIRED FASTING GLUCOSE: ICD-10-CM

## 2021-06-07 DIAGNOSIS — E78.5 DYSLIPIDEMIA: ICD-10-CM

## 2021-06-07 DIAGNOSIS — I10 HTN (HYPERTENSION), BENIGN: ICD-10-CM

## 2021-06-07 LAB
ALBUMIN SERPL BCP-MCNC: 4.1 G/DL (ref 3.2–4.9)
ALBUMIN/GLOB SERPL: 1.5 G/DL
ALP SERPL-CCNC: 90 U/L (ref 30–99)
ALT SERPL-CCNC: 22 U/L (ref 2–50)
ANION GAP SERPL CALC-SCNC: 10 MMOL/L (ref 7–16)
AST SERPL-CCNC: 26 U/L (ref 12–45)
BILIRUB SERPL-MCNC: 0.6 MG/DL (ref 0.1–1.5)
BUN SERPL-MCNC: 23 MG/DL (ref 8–22)
CALCIUM SERPL-MCNC: 9.5 MG/DL (ref 8.5–10.5)
CHLORIDE SERPL-SCNC: 103 MMOL/L (ref 96–112)
CHOLEST SERPL-MCNC: 224 MG/DL (ref 100–199)
CO2 SERPL-SCNC: 25 MMOL/L (ref 20–33)
CREAT SERPL-MCNC: 0.54 MG/DL (ref 0.5–1.4)
EST. AVERAGE GLUCOSE BLD GHB EST-MCNC: 114 MG/DL
FASTING STATUS PATIENT QL REPORTED: NORMAL
GLOBULIN SER CALC-MCNC: 2.8 G/DL (ref 1.9–3.5)
GLUCOSE SERPL-MCNC: 92 MG/DL (ref 65–99)
HBA1C MFR BLD: 5.6 % (ref 4–5.6)
HDLC SERPL-MCNC: 58 MG/DL
LDLC SERPL CALC-MCNC: 140 MG/DL
MAGNESIUM SERPL-MCNC: 2.1 MG/DL (ref 1.5–2.5)
POTASSIUM SERPL-SCNC: 3.8 MMOL/L (ref 3.6–5.5)
PROT SERPL-MCNC: 6.9 G/DL (ref 6–8.2)
SODIUM SERPL-SCNC: 138 MMOL/L (ref 135–145)
TRIGL SERPL-MCNC: 128 MG/DL (ref 0–149)
TSH SERPL DL<=0.005 MIU/L-ACNC: 1.9 UIU/ML (ref 0.38–5.33)
VIT B12 SERPL-MCNC: 640 PG/ML (ref 211–911)

## 2021-06-07 PROCEDURE — 82607 VITAMIN B-12: CPT

## 2021-06-07 PROCEDURE — 83735 ASSAY OF MAGNESIUM: CPT

## 2021-06-07 PROCEDURE — 82306 VITAMIN D 25 HYDROXY: CPT | Mod: GA

## 2021-06-07 PROCEDURE — 80061 LIPID PANEL: CPT

## 2021-06-07 PROCEDURE — 84443 ASSAY THYROID STIM HORMONE: CPT

## 2021-06-07 PROCEDURE — 80053 COMPREHEN METABOLIC PANEL: CPT

## 2021-06-07 PROCEDURE — 36415 COLL VENOUS BLD VENIPUNCTURE: CPT

## 2021-06-07 PROCEDURE — 83036 HEMOGLOBIN GLYCOSYLATED A1C: CPT | Mod: GA

## 2021-06-09 LAB — 25(OH)D3 SERPL-MCNC: 52 NG/ML (ref 30–80)

## 2021-06-21 ENCOUNTER — OFFICE VISIT (OUTPATIENT)
Dept: MEDICAL GROUP | Facility: PHYSICIAN GROUP | Age: 86
End: 2021-06-21
Payer: MEDICARE

## 2021-06-21 VITALS
TEMPERATURE: 98.8 F | HEART RATE: 94 BPM | DIASTOLIC BLOOD PRESSURE: 50 MMHG | HEIGHT: 61 IN | SYSTOLIC BLOOD PRESSURE: 122 MMHG | WEIGHT: 105.6 LBS | BODY MASS INDEX: 19.94 KG/M2 | OXYGEN SATURATION: 93 %

## 2021-06-21 DIAGNOSIS — Z00.00 MEDICARE ANNUAL WELLNESS VISIT, SUBSEQUENT: Primary | ICD-10-CM

## 2021-06-21 DIAGNOSIS — D36.9 TUBULAR ADENOMA: ICD-10-CM

## 2021-06-21 PROCEDURE — G0439 PPPS, SUBSEQ VISIT: HCPCS | Performed by: INTERNAL MEDICINE

## 2021-06-21 ASSESSMENT — ACTIVITIES OF DAILY LIVING (ADL): BATHING_REQUIRES_ASSISTANCE: 0

## 2021-06-21 ASSESSMENT — PATIENT HEALTH QUESTIONNAIRE - PHQ9: CLINICAL INTERPRETATION OF PHQ2 SCORE: 0

## 2021-06-21 ASSESSMENT — ENCOUNTER SYMPTOMS: GENERAL WELL-BEING: FAIR

## 2021-06-21 NOTE — PROGRESS NOTES
Tubular Adenoma  Has history of adenomatous polyps on prior colonoscopy but no longer undergoing screenings due to advanced age.     Postherpetic Neuralgia  Established problem, stable per patient. Not overly bothersome, no current medication. Will continue to monitor.     Osteoporosis  Established problem, stable. Previously completed treated with Fosamax and Prolia. Advised to continue calcium/vitamin D supplement.     Macular degeneration  Established problem, stable per patient. Takes Ocuvite Preservision supplement. She follows regularly with eye doctor whom she will continue to follow.     Hx: Breast Cancer  Has history of L breast cancer. S/p unilateral mastectomy and chemotherapy. She is compliant with annual screening mammograms of remaining R breast, last done in 11/2019. Continue current management.     HTN (hypertension), benign  Established problem, well-controlled with felodipine 5 mg daily. BP today in the office is 144/82 which is acceptable for age. Continue current management.     Dyslipidemia  Established problem, well-controlled with medication. Is prescribed fenofibrate 48 mg daily and lovastatin 20 mg daily but it appears she has been taking just fenofibrate and not lovastatin. In addition, believes she's been taking gemfibrozil 600 mg BID--a prescription that was previously discontinued. Last lipid profile in 1/2020 reviewed. Shows elevated TC and LDL but high HDL of 85. Recommend she re-start the lovastatin. I have refilled this for her.     DJD (degenerative joint disease)  Established problem, stable. Undergoes periodic steroid injection of L knee. Has h/o prior R TKA in 2015. She will continue to follow with orthopedics.     Bladder Incontinence  Established problem, stable per patient. Not overly bothered by symptoms. Will continue to monitor.

## 2021-06-21 NOTE — PATIENT INSTRUCTIONS
Natural vitamins from whole foods (fruit and vegetable)  Vitamin B complex supplement (methylcobalamin B12, methyl folate B9).   Magnesium glycinate supplement 200 to 400 mg daily  Vitamin D3 with K2 supplement   Essential oil supplement (cod liver oil)  Turmeric, mar, Boswellia, black pepper, Cinnamon combination supplement       Advance Directive    Advance directives are legal documents that let you make choices ahead of time about your health care and medical treatment in case you become unable to communicate for yourself. Advance directives are a way for you to communicate your wishes to family, friends, and health care providers. This can help convey your decisions about end-of-life care if you become unable to communicate.  Discussing and writing advance directives should happen over time rather than all at once. Advance directives can be changed depending on your situation and what you want, even after you have signed the advance directives.  If you do not have an advance directive, some states assign family decision makers to act on your behalf based on how closely you are related to them. Each state has its own laws regarding advance directives. You may want to check with your health care provider, , or state representative about the laws in your state. There are different types of advance directives, such as:  · Medical power of .  · Living will.  · Do not resuscitate (DNR) or do not attempt resuscitation (DNAR) order.  Health care proxy and medical power of   A health care proxy, also called a health care agent, is a person who is appointed to make medical decisions for you in cases in which you are unable to make the decisions yourself. Generally, people choose someone they know well and trust to represent their preferences. Make sure to ask this person for an agreement to act as your proxy. A proxy may have to exercise judgment in the event of a medical decision for which  your wishes are not known.  A medical power of  is a legal document that names your health care proxy. Depending on the laws in your state, after the document is written, it may also need to be:  · Signed.  · Notarized.  · Dated.  · Copied.  · Witnessed.  · Incorporated into your medical record.  You may also want to appoint someone to manage your financial affairs in a situation in which you are unable to do so. This is called a durable power of  for finances. It is a separate legal document from the durable power of  for health care. You may choose the same person or someone different from your health care proxy to act as your agent in financial matters.  If you do not appoint a proxy, or if there is a concern that the proxy is not acting in your best interests, a court-appointed guardian may be designated to act on your behalf.  Living will  A living will is a set of instructions documenting your wishes about medical care when you cannot express them yourself. Health care providers should keep a copy of your living will in your medical record. You may want to give a copy to family members or friends. To alert caregivers in case of an emergency, you can place a card in your wallet to let them know that you have a living will and where they can find it. A living will is used if you become:  · Terminally ill.  · Incapacitated.  · Unable to communicate or make decisions.  Items to consider in your living will include:  · The use or non-use of life-sustaining equipment, such as dialysis machines and breathing machines (ventilators).  · A DNR or DNAR order, which is the instruction not to use cardiopulmonary resuscitation (CPR) if breathing or heartbeat stops.  · The use or non-use of tube feeding.  · Withholding of food and fluids.  · Comfort (palliative) care when the goal becomes comfort rather than a cure.  · Organ and tissue donation.  A living will does not give instructions for  distributing your money and property if you should pass away. It is recommended that you seek the advice of a  when writing a will. Decisions about taxes, beneficiaries, and asset distribution will be legally binding. This process can relieve your family and friends of any concerns surrounding disputes or questions that may come up about the distribution of your assets.  DNR or DNAR  A DNR or DNAR order is a request not to have CPR in the event that your heart stops beating or you stop breathing. If a DNR or DNAR order has not been made and shared, a health care provider will try to help any patient whose heart has stopped or who has stopped breathing. If you plan to have surgery, talk with your health care provider about how your DNR or DNAR order will be followed if problems occur.  Summary  · Advance directives are the legal documents that allow you to make choices ahead of time about your health care and medical treatment in case you become unable to communicate for yourself.  · The process of discussing and writing advance directives should happen over time. You can change the advance directives, even after you have signed them.  · Advance directives include DNR or DNAR orders, living rodriguez, and designating an agent as your medical power of .  This information is not intended to replace advice given to you by your health care provider. Make sure you discuss any questions you have with your health care provider.  Document Released: 03/26/2009 Document Revised: 01/22/2020 Document Reviewed: 11/06/2017  [employer] Patient Education © 2020 [employer]

## 2021-06-21 NOTE — PROGRESS NOTES
Chief Complaint   Patient presents with   • Annual Wellness Visit         HPI:  Lizz is a 89 y.o. here for Medicare Annual Wellness Visit    Patient Active Problem List    Diagnosis Date Noted   • Macular degeneration 09/07/2012   • Osteoporosis 09/09/2011   • HTN (hypertension), benign 12/17/2009   • Dyslipidemia    • DJD (degenerative joint disease)    • HX: breast cancer    • Tubular adenoma    • Postherpetic neuralgia    • Bladder incontinence        Current Outpatient Medications   Medication Sig Dispense Refill   • felodipine (PLENDIL) 10 MG TABLET SR 24 HR Take 1 tablet by mouth every day. 90 tablet 0   • lovastatin (MEVACOR) 20 MG Tab TAKE 1 TABLET DAILY 90 tablet 0   • diclofenac sodium 1 % Gel diclofenac 1 % topical gel     • Multiple Vitamins-Minerals (OCUVITE PRESERVISION PO) Take 1 Tab by mouth 2 Times a Day. PreserVision AREDS 2     • Cholecalciferol (VITAMIN D3) 2000 units Tab Take 5,000 Units by mouth every day.     • Multiple Vitamins-Calcium (ONE-A-DAY WOMENS PO) Take  by mouth every day.     • amoxicillin (AMOXIL) 500 MG Cap amoxicillin 500 mg capsule (Patient not taking: Reported on 6/21/2021)     • mupirocin (BACTROBAN) 2 % Ointment mupirocin 2 % topical ointment (Patient not taking: Reported on 6/21/2021)       No current facility-administered medications for this visit.        Patient is taking medications as noted in medication list.  Current supplements as per medication list.     Allergies: Codeine, Floxin [ofloxacin], Lidoderm, Methylprednisolone sodium succ, and Toradol    Current social contact/activities: shopping, gardening, yard work.     Is patient current with immunizations? No, due for SHINGRIX (Shingles). Patient is interested in receiving a prescription for SHINGRIX today.    She  reports that she has never smoked. She has never used smokeless tobacco. She reports that she does not drink alcohol and does not use drugs.  Counseling given: Not Answered        DPA/Advanced  directive: Patient does not have an Advanced Directive.  A packet and workshop information was given on Advanced Directives.    ROS:    Gait: Uses no assistive device   Ostomy: No   Other tubes: No   Amputations: No   Chronic oxygen use No   Last eye exam 2/2021   Wears hearing aids: No   : Denies any urinary leakage during the last 6 months      Screening:        Depression Screening    Little interest or pleasure in doing things?  0 - not at all  Feeling down, depressed, or hopeless? 0 - not at all  Patient Health Questionnaire Score: 0    If depressive symptoms identified deferred to follow up visit unless specifically addressed in assessment and plan.    Interpretation of PHQ-9 Total Score   Score Severity   1-4 No Depression   5-9 Mild Depression   10-14 Moderate Depression   15-19 Moderately Severe Depression   20-27 Severe Depression    Screening for Cognitive Impairment    Three Minute Recall (captain, garden, picture)  2/3    Tesfaye clock face with all 12 numbers and set the hands to show 5 past 8.  No 4/5  If cognitive concerns identified, deferred for follow up unless specifically addressed in assessment and plan.    Fall Risk Assessment    Has the patient had two or more falls in the last year or any fall with injury in the last year?  No  If fall risk identified, deferred for follow up unless specifically addressed in assessment and plan.    Safety Assessment    Throw rugs on floor.  Yes  Handrails on all stairs.  Yes  Good lighting in all hallways.  Yes  Difficulty hearing.  No  Patient counseled about all safety risks that were identified.    Functional Assessment ADLs    Are there any barriers preventing you from cooking for yourself or meeting nutritional needs?  No.    Are there any barriers preventing you from driving safely or obtaining transportation?  No.    Are there any barriers preventing you from using a telephone or calling for help?  No.    Are there any barriers preventing you from  shopping?  No.    Are there any barriers preventing you from taking care of your own finances?  Yes.  takes care of it.  Are there any barriers preventing you from managing your medications?  No.    Are there any barriers preventing you from showering, bathing or dressing yourself?  No.    Are you currently engaging in any exercise or physical activity?  Yes.     What is your perception of your health?  Fair.    Health Maintenance Summary                IMM ZOSTER VACCINES Overdue 8/4/1977     Annual Wellness Visit Overdue 6/13/2021      Done 6/12/2020 SUBSEQUENT ANNUAL WELLNESS VISIT-INCLUDES PPPS ()     Patient has more history with this topic...    MAMMOGRAM Next Due 11/3/2021      Done 11/3/2020 [id number]     Patient has more history with this topic...    IMM DTaP/Tdap/Td Vaccine Next Due 12/21/2030      Done 12/21/2020 Imm Admin: Tdap Vaccine     Patient has more history with this topic...          Patient Care Team:  Cristofer Cleveland M.D. as PCP - General (Internal Medicine)  Ashish Serrano M.D. (Orthopaedics)    Social History     Tobacco Use   • Smoking status: Never Smoker   • Smokeless tobacco: Never Used   Substance Use Topics   • Alcohol use: No     Alcohol/week: 0.0 oz   • Drug use: No     Family History   Problem Relation Age of Onset   • Hypertension Mother    • Other Father    • Other Sister         alzheimer     She  has a past medical history of Arthritis (2015), Bilateral cataracts, Cancer (HCC) (1990), DJD (degenerative joint disease), High cholesterol, HTN, breast cancer, Hyperlipidemia, Osteoporosis, Pain (7-2015), Pigmented skin lesion (4/30/2015), Postherpetic neuralgia, and Tubular adenoma. She also has no past medical history of Encounter for long-term (current) use of other medications.   Past Surgical History:   Procedure Laterality Date   • KNEE MANIPULATION Right 10/12/2015    Procedure: KNEE MANIPULATION;  Surgeon: Ashish Serrano M.D.;  Location:  "SURGERY [facility];  Service:    • KNEE ARTHROPLASTY TOTAL Right 7/13/2015    Procedure: KNEE ARTHROPLASTY TOTAL;  Surgeon: Ashish Serrano M.D.;  Location: [facility];  Service:    • CATARACT EXTRACTION WITH IOL  2000    Bilateral   • ROTATOR CUFF REPAIR  1990    Left shoulder   • MASTECTOMY  1990    Left   • CYST EXCISION Left 1989    ganglion cyst removal, wrist   • HYSTERECTOMY, TOTAL ABDOMINAL  1970           Exam:     /50 (BP Location: Right arm, Patient Position: Sitting, BP Cuff Size: Adult)   Pulse 94   Temp 37.1 °C (98.8 °F) (Temporal)   Ht 1.549 m (5' 1\")   Wt 47.9 kg (105 lb 9.6 oz)   SpO2 93%  Body mass index is 19.95 kg/m².    Hearing good.    Dentition fair  Alert, oriented in no acute distress.  Eye contact is good, speech goal directed, affect calm      Assessment and Plan.     Tubular Adenoma  - history of adenomatous polyps on prior colonoscopy but no longer undergoing screenings due to advanced age.     Osteoporosis   stable.  Per previous note, previously completed treated with Fosamax and Prolia. Advised to continue calcium/vitamin D supplement.     Macular degeneration  Chronic, stable . Takes Ocuvite Preservision supplement. She follows regularly with eye doctor whom she will continue to follow.     Hx: Breast Cancer  Has history of left breast cancer many years ago. S/p unilateral mastectomy and chemotherapy. She is compliant with annual screening mammograms of remaining R breast, continue mammogram surveillance     HTN (hypertension), benign  Chronic,, well-controlled with felodipine 5 mg daily.  Continue same management, denied having related symptoms     Dyslipidemia  Chronic, stable, continue lovastatin 20 mg daily,     Bladder Incontinence  Chronic, stable per patient. Not overly bothered by symptoms. Will continue to monitor.    Services suggested: No services needed at this time  Health Care Screening recommendations as per orders if " indicated.  Referrals offered: PT/OT/Nutrition counseling/Behavioral Health/Smoking cessation as per orders if indicated.    Discussion today about general wellness and lifestyle habits:    · Prevent falls and reduce trip hazards; Cautioned about securing or removing rugs.  · Have a working fire alarm and carbon monoxide detector;   · Engage in regular physical activity and social activities       Follow-up: Return in about 1 year (around 6/21/2022) for follow up.

## 2021-07-08 DIAGNOSIS — I10 HTN (HYPERTENSION), BENIGN: ICD-10-CM

## 2021-07-09 RX ORDER — FELODIPINE 10 MG/1
10 TABLET, EXTENDED RELEASE ORAL DAILY
Qty: 90 TABLET | Refills: 3 | Status: SHIPPED | OUTPATIENT
Start: 2021-07-09 | End: 2022-02-22 | Stop reason: SDUPTHER

## 2021-08-10 DIAGNOSIS — E78.5 DYSLIPIDEMIA: ICD-10-CM

## 2021-08-10 DIAGNOSIS — I10 HTN (HYPERTENSION), BENIGN: ICD-10-CM

## 2021-08-10 NOTE — TELEPHONE ENCOUNTER
Received request via: Pharmacy    Was the patient seen in the last year in this department? Yes    Does the patient have an active prescription (recently filled or refills available) for medication(s) requested? No     Requesting a 90 days supply for Felodipine

## 2021-08-12 RX ORDER — FELODIPINE 10 MG/1
10 TABLET, EXTENDED RELEASE ORAL DAILY
Qty: 90 TABLET | Refills: 3 | OUTPATIENT
Start: 2021-08-12

## 2021-08-13 RX ORDER — FENOFIBRATE 48 MG/1
48 TABLET, COATED ORAL DAILY
Qty: 90 TABLET | Refills: 1 | Status: SHIPPED | OUTPATIENT
Start: 2021-08-13 | End: 2022-02-10

## 2021-08-25 DIAGNOSIS — E78.5 DYSLIPIDEMIA: ICD-10-CM

## 2021-08-26 RX ORDER — LOVASTATIN 20 MG/1
TABLET ORAL
Qty: 90 TABLET | Refills: 0 | Status: SHIPPED | OUTPATIENT
Start: 2021-08-26 | End: 2021-11-08

## 2021-08-26 NOTE — TELEPHONE ENCOUNTER
Pt has had OV within the 12 month protocol and lipid panel is current. 6 month supply sent to pharmacy. Pt to restart statin.   Lab Results   Component Value Date/Time    CHOLSTRLTOT 224 (H) 06/07/2021 09:37 AM     (H) 06/07/2021 09:37 AM    HDL 58 06/07/2021 09:37 AM    TRIGLYCERIDE 128 06/07/2021 09:37 AM       Lab Results   Component Value Date/Time    SODIUM 138 06/07/2021 09:37 AM    POTASSIUM 3.8 06/07/2021 09:37 AM    CHLORIDE 103 06/07/2021 09:37 AM    CO2 25 06/07/2021 09:37 AM    GLUCOSE 92 06/07/2021 09:37 AM    BUN 23 (H) 06/07/2021 09:37 AM    CREATININE 0.54 06/07/2021 09:37 AM    CREATININE 0.81 02/17/2010 10:19 AM    BUNCREATRAT 23 02/17/2010 10:19 AM    GLOMRATE >59 02/17/2010 10:19 AM     Lab Results   Component Value Date/Time    ALKPHOSPHAT 90 06/07/2021 09:37 AM    ASTSGOT 26 06/07/2021 09:37 AM    ALTSGPT 22 06/07/2021 09:37 AM    TBILIRUBIN 0.6 06/07/2021 09:37 AM

## 2021-09-30 ENCOUNTER — NON-PROVIDER VISIT (OUTPATIENT)
Dept: MEDICAL GROUP | Facility: PHYSICIAN GROUP | Age: 86
End: 2021-09-30
Payer: MEDICARE

## 2021-09-30 DIAGNOSIS — Z23 NEED FOR VACCINATION: ICD-10-CM

## 2021-09-30 PROCEDURE — G0008 ADMIN INFLUENZA VIRUS VAC: HCPCS | Performed by: NURSE PRACTITIONER

## 2021-09-30 PROCEDURE — 90662 IIV NO PRSV INCREASED AG IM: CPT | Performed by: NURSE PRACTITIONER

## 2021-09-30 NOTE — PROGRESS NOTES
"Lizz Saldana is a 89 y.o. female here for a non-provider visit for:   FLU    Reason for immunization: Annual Flu Vaccine  Immunization records indicate need for vaccine: Yes, confirmed with Epic  Minimum interval has been met for this vaccine: Yes  ABN completed: Not Indicated    VIS Dated  8/6/21 was given to patient: Yes  All IAC Questionnaire questions were answered \"No.\"    Patient tolerated injection and no adverse effects were observed or reported: Yes    Pt scheduled for next dose in series: Not Indicated    "

## 2021-10-01 ENCOUNTER — APPOINTMENT (OUTPATIENT)
Dept: RADIOLOGY | Facility: MEDICAL CENTER | Age: 86
End: 2021-10-01
Attending: EMERGENCY MEDICINE
Payer: MEDICARE

## 2021-10-01 ENCOUNTER — HOSPITAL ENCOUNTER (EMERGENCY)
Facility: MEDICAL CENTER | Age: 86
End: 2021-10-01
Attending: EMERGENCY MEDICINE
Payer: MEDICARE

## 2021-10-01 VITALS
DIASTOLIC BLOOD PRESSURE: 62 MMHG | HEART RATE: 94 BPM | HEIGHT: 60 IN | OXYGEN SATURATION: 94 % | TEMPERATURE: 97.2 F | SYSTOLIC BLOOD PRESSURE: 141 MMHG | BODY MASS INDEX: 21.01 KG/M2 | WEIGHT: 107 LBS | RESPIRATION RATE: 16 BRPM

## 2021-10-01 DIAGNOSIS — S46.911A STRAIN OF RIGHT SHOULDER, INITIAL ENCOUNTER: ICD-10-CM

## 2021-10-01 DIAGNOSIS — S52.501A CLOSED FRACTURE OF DISTAL END OF RIGHT RADIUS, UNSPECIFIED FRACTURE MORPHOLOGY, INITIAL ENCOUNTER: ICD-10-CM
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
## 2021-10-01 PROCEDURE — 73010 X-RAY EXAM OF SHOULDER BLADE: CPT | Mod: RT

## 2021-10-01 PROCEDURE — 99284 EMERGENCY DEPT VISIT MOD MDM: CPT

## 2021-10-01 PROCEDURE — 29125 APPL SHORT ARM SPLINT STATIC: CPT

## 2021-10-01 PROCEDURE — 73110 X-RAY EXAM OF WRIST: CPT | Mod: RT

## 2021-10-01 PROCEDURE — 302874 HCHG BANDAGE ACE 2 OR 3""

## 2021-10-01 NOTE — ED TRIAGE NOTES
Chief Complaint   Patient presents with   • GLF     Pt tripped in [place] and landed on R wrist.  Swelling noted to R wrist. +CSM to wrist.     Did not hit head, does not take blood thinners.  Received 50 mcg fentanyl pre-hospital.

## 2021-10-01 NOTE — ED NOTES
Discharge instructions given to pt including follow up with orthopedic surgeon or returning if no improvement of symptoms or to return if worse. Questions answered by RN. Denies any new complaints. Discharged w/stable vitals and able wheeled to the lobby by RN also accompanied by friend. Friend to drive pt home.

## 2021-10-01 NOTE — ED PROVIDER NOTES
ED Provider Note    CHIEF COMPLAINT   Chief Complaint   Patient presents with   • GLF     Pt tripped in [place] and landed on R wrist.  Swelling noted to R wrist. +CSM to wrist.       HPI   Lizz Saldana is a 89 y.o. female who presents with right distal forearm and wrist pain.  She tripped while gardening.  Patient complains of moderate to severe right distal forearm and wrist pain.  She states her right scapula is sore.  She denies head or neck injury.  No numbness or weakness.  No syncope or dizziness involved in the fall she states.  Pain in the right wrist worse with movement.  No associated numbness.    REVIEW OF SYSTEMS   Musculoskeletal: Right wrist pain, right scapular pain  Neurologic: No head injury  Skin: Swelling right wrist      PAST MEDICAL HISTORY   Past Medical History:   Diagnosis Date   • Arthritis 2015    left knee   • Bilateral cataracts     bilateral IOL   • Cancer (HCC) 1990    Breast    • DJD (degenerative joint disease)    • High cholesterol    • HTN    • HX: breast cancer    • Hyperlipidemia    • Osteoporosis    • Pain 7-2015    right knee   • Pigmented skin lesion 4/30/2015   • Postherpetic neuralgia    • Tubular adenoma        FAMILY HISTORY  Family History   Problem Relation Age of Onset   • Hypertension Mother    • Other Father    • Other Sister         alzheimer       SOCIAL HISTORY  Social History     Socioeconomic History   • Marital status:      Spouse name: Not on file   • Number of children: 0   • Years of education: Not on file   • Highest education level: Not on file   Occupational History     Employer: RETIRED   Tobacco Use   • Smoking status: Never Smoker   • Smokeless tobacco: Never Used   Substance and Sexual Activity   • Alcohol use: No     Alcohol/week: 0.0 oz   • Drug use: No   • Sexual activity: Never     Partners: Male   Other Topics Concern   • Not on file   Social History Narrative   • Not on file     Social Determinants of Health     Financial  Resource Strain:    • Difficulty of Paying Living Expenses:    Food Insecurity:    • Worried About Running Out of Food in the Last Year:    • Ran Out of Food in the Last Year:    Transportation Needs:    • Lack of Transportation (Medical):    • Lack of Transportation (Non-Medical):    Physical Activity:    • Days of Exercise per Week:    • Minutes of Exercise per Session:    Stress:    • Feeling of Stress :    Social Connections:    • Frequency of Communication with Friends and Family:    • Frequency of Social Gatherings with Friends and Family:    • Attends Christian Services:    • Active Member of Clubs or Organizations:    • Attends Club or Organization Meetings:    • Marital Status:    Intimate Partner Violence:    • Fear of Current or Ex-Partner:    • Emotionally Abused:    • Physically Abused:    • Sexually Abused:        SURGICAL HISTORY  Past Surgical History:   Procedure Laterality Date   • KNEE MANIPULATION Right 10/12/2015    Procedure: KNEE MANIPULATION;  Surgeon: Ashish Serrano M.D.;  Location: SURGERY [facility];  Service:    • KNEE ARTHROPLASTY TOTAL Right 7/13/2015    Procedure: KNEE ARTHROPLASTY TOTAL;  Surgeon: Ashish Serrano M.D.;  Location: SURGERY [facility];  Service:    • CATARACT EXTRACTION WITH IOL  2000    Bilateral   • ROTATOR CUFF REPAIR  1990    Left shoulder   • MASTECTOMY  1990    Left   • CYST EXCISION Left 1989    ganglion cyst removal, wrist   • HYSTERECTOMY, TOTAL ABDOMINAL  1970       CURRENT MEDICATIONS   Home Medications     Reviewed by Beth Shepherd R.N. (Registered Nurse) on 10/01/21 at 1213  Med List Status: Not Addressed   Medication Last Dose Status   Cholecalciferol (VITAMIN D3) 2000 units Tab  Active   diclofenac sodium 1 % Gel  Active   felodipine (PLENDIL) 10 MG TABLET SR 24 HR  Active   fenofibrate (TRICOR) 48 MG Tab  Active   lovastatin (MEVACOR) 20 MG Tab  Active   Multiple Vitamins-Calcium (ONE-A-DAY WOMENS PO)  Active   Multiple  Vitamins-Minerals (OCUVITE PRESERVISION PO)  Active                ALLERGIES   Allergies   Allergen Reactions   • Codeine Nausea   • Floxin [Ofloxacin] Itching and Swelling   • Lidoderm      rash   • Methylprednisolone Sodium Succ Rash and Itching     Painful skin blistering LE   • Toradol Unspecified     .       PHYSICAL EXAM  VITAL SIGNS: /74   Pulse 90   Resp 16   Ht 1.524 m (5')   Wt 48.5 kg (107 lb)   SpO2 95%   BMI 20.90 kg/m²   Skin: No laceration, no abrasion.  Swollen area over the dorsum of the right wrist..   Vascular: Intact distal capillary refill.   Musculoskeletal: Tenderness distal right radius.  Dorsal wrist.  No snuffbox tenderness.  Scapula is tender without deformity.  Shoulder joint, humerus, right elbow, and spine are nontender.  Neurologic: Sensation normal.  Moves all extremities well.    RADIOLOGY/PROCEDURES  DX-SCAPULA RIGHT   Final Result      No evidence of fracture or dislocation.      DX-WRIST-COMPLETE 3+ RIGHT   Final Result      Distal radial metaphyseal fracture with mild impaction and dorsal angulation.               COURSE & MEDICAL DECISION MAKING  Pertinent Labs & Imaging studies reviewed. (See chart for details)  Patient with a fall injury, pain in the right scapula, right distal radius and wrist region.  X-ray demonstrates impaction fracture right distal radius.  Is placed in a volar OCL splint, sling, advised not to use the hand until cleared to do so by her doctor.  She is referred to on-call orthopedist for follow-up and definitive care.  After discussion of pain medication, patient agreeable to using Tylenol for pain.  A neighbor has come to pick her up and help take her home.    FINAL IMPRESSION     1. Closed fracture of distal end of right radius, unspecified fracture morphology, initial encounter     2. Strain of right shoulder, initial encounter     3. Fall, initial encounter               Electronically signed by: Yinka Delacruz M.D., 10/1/2021 12:35  PM

## 2021-10-01 NOTE — ED NOTES
Pt ambulatory to restroom with 1 RN standby assist and back to room with steady, upright gait. Patient updated on pending testing, resting in no distress, call light within reach. Xray at bedside.

## 2021-10-28 ENCOUNTER — TELEPHONE (OUTPATIENT)
Dept: MEDICAL GROUP | Facility: PHYSICIAN GROUP | Age: 86
End: 2021-10-28

## 2021-11-01 DIAGNOSIS — Z12.31 ENCOUNTER FOR SCREENING MAMMOGRAM FOR MALIGNANT NEOPLASM OF BREAST: ICD-10-CM

## 2021-11-01 DIAGNOSIS — Z85.3 HX: BREAST CANCER: ICD-10-CM

## 2021-11-03 DIAGNOSIS — N64.59 ABNORMAL BREAST TISSUE: ICD-10-CM

## 2021-11-05 ENCOUNTER — HOSPITAL ENCOUNTER (EMERGENCY)
Facility: MEDICAL CENTER | Age: 86
End: 2021-11-05
Attending: EMERGENCY MEDICINE
Payer: MEDICARE

## 2021-11-05 VITALS
RESPIRATION RATE: 16 BRPM | BODY MASS INDEX: 21.01 KG/M2 | TEMPERATURE: 98.5 F | SYSTOLIC BLOOD PRESSURE: 160 MMHG | OXYGEN SATURATION: 94 % | WEIGHT: 107 LBS | DIASTOLIC BLOOD PRESSURE: 70 MMHG | HEIGHT: 60 IN | HEART RATE: 85 BPM

## 2021-11-05 DIAGNOSIS — Z04.9 OBSERVATION AND EVALUATION FOR SUSPECTED CONDITIONS NOT FOUND: ICD-10-CM

## 2021-11-05 LAB
APPEARANCE UR: CLEAR
BACTERIA #/AREA URNS HPF: NEGATIVE /HPF
BILIRUB UR QL STRIP.AUTO: NEGATIVE
COLOR UR: YELLOW
EPI CELLS #/AREA URNS HPF: NEGATIVE /HPF
GLUCOSE UR STRIP.AUTO-MCNC: NEGATIVE MG/DL
HCG UR QL: NEGATIVE
HYALINE CASTS #/AREA URNS LPF: NORMAL /LPF
KETONES UR STRIP.AUTO-MCNC: NEGATIVE MG/DL
LEUKOCYTE ESTERASE UR QL STRIP.AUTO: ABNORMAL
MICRO URNS: ABNORMAL
NITRITE UR QL STRIP.AUTO: NEGATIVE
PH UR STRIP.AUTO: 6 [PH] (ref 5–8)
PROT UR QL STRIP: NEGATIVE MG/DL
RBC # URNS HPF: NORMAL /HPF
RBC UR QL AUTO: NEGATIVE
SP GR UR STRIP.AUTO: 1.01
UROBILINOGEN UR STRIP.AUTO-MCNC: 1 MG/DL
WBC #/AREA URNS HPF: NORMAL /HPF

## 2021-11-05 PROCEDURE — 99284 EMERGENCY DEPT VISIT MOD MDM: CPT

## 2021-11-05 PROCEDURE — 81025 URINE PREGNANCY TEST: CPT

## 2021-11-05 PROCEDURE — 81001 URINALYSIS AUTO W/SCOPE: CPT

## 2021-11-05 NOTE — TELEPHONE ENCOUNTER
Patient spouse requesting mammo  
Phone call unable to leave message   
Relayed message below  
Return call placed to patient spouse, Nabil, to notify him that order has been placed for Mammogram. Patient spouse states that she also needs an order for an ultrasound due to patient's dense tissue.   
[de-identified] : MRI brain with and without contrast 5/4/21:\par Impression:\par 1. Suboccipital craniotomy with resection cavity in the left cerebellum with mild associated gliosis. Minimal enhancement in the superior aspect of the cavity likely represents choroid plexus rather than tumor although comparison to prior imaging can be performed if made available.\par 2. Moderately prominent 4th ventricle and mildly prominent lateral and 3rd ventricles and mildly prominent cerebral aqueduct with a right frontal approach ventricular drain in the right lateral ventricle. Comparison to prior imaging can be performed if made available. Clinical correlation is recommended.\par 3. Mild white matter disease likely mild chronic microvascular ischemic disease among other etiologies.

## 2021-11-06 NOTE — ED NOTES
"SUBJECTIVE:                                                    Darcy Cesar is a 9 year old female who presents to clinic today with father because of:    Chief Complaint   Patient presents with     Pharyngitis     Ear Problem     Right      HPI:  ENT/Cough Symptoms    Problem started: 1 days ago  Fever: no  Runny nose: YES  Congestion: no  Sore Throat: YES  Cough: no  Eye discharge/redness:  no  Ear problem: YES  Wheeze: no   Sick contacts: School;  Strep exposure: School;  Therapies Tried: none    No abdominal pain, no nausea, no vomiting, no diarrhea.  Patient voiding/stooling wnl.   History recurrent AOM as younger child, s/p tympanoplasty.  No recent ENT issues.     +exposure to strep at school.       ROS:  Negative for constitutional, eye, ear, nose, throat, skin, respiratory, cardiac, and gastrointestinal other than those outlined in the HPI.    PROBLEM LIST:  Patient Active Problem List    Diagnosis Date Noted     Tympanic membrane perforation, RIGHT 10/21/2016     Priority: Medium     S/p patch closure       Nut allergy 02/20/2013     Priority: Medium     Sees Allergist; pecan and walnut only.  Has epi pen and action plan.        MEDICATIONS:  Current Outpatient Prescriptions   Medication Sig Dispense Refill     EPINEPHrine (EPIPEN JR 2-BEVERLY) 0.15 MG/0.3ML injection 2-pack Inject 0.3 mLs (0.15 mg) into the muscle as needed 0.6 mL 3     Pediatric Multiple Vit-C-FA (CHILDRENS MULTIVITAMIN PO) Take  by mouth.        ALLERGIES:  Allergies   Allergen Reactions     Nuts      Pecans and walnuts       Problem list and histories reviewed & adjusted, as indicated.    OBJECTIVE:                                                      /65 (BP Location: Left arm, Patient Position: Chair, Cuff Size: Child)  Pulse 94  Ht 4' 2.59\" (1.285 m)  Wt 58 lb 3.2 oz (26.4 kg)  BMI 15.99 kg/m2   Blood pressure percentiles are 83 % systolic and 71 % diastolic based on NHBPEP's 4th Report. Blood pressure percentile " Pt able to transfer to , Pt taken to [facility], Pt returned to [facility] wo incident. Pt reconnected to Pulse ox and BP.    targets: 90: 112/73, 95: 116/77, 99 + 5 mmH/89.    GENERAL: Active, alert, in no acute distress.  SKIN: Clear. No significant rash, abnormal pigmentation or lesions  HEAD: Normocephalic.  EYES:  No discharge or erythema. Normal pupils and EOM.  EARS: Normal canals. R TM with white/yellow purulent effusion, TM erythematous.  L TM clear/grey.   NOSE: Normal without discharge.  MOUTH/THROAT: Clear. No oral lesions. Posterior pharynx with mild erythema, no exudate.  Uvula midline.  tonsils 2+/equal with injection.   NECK: Supple, no masses.  LYMPH NODES: No adenopathy  LUNGS: Clear. No rales, rhonchi, wheezing or retractions  HEART: Regular rhythm. Normal S1/S2. No murmurs.  ABDOMEN: Soft, non-tender, not distended, no masses or hepatosplenomegaly. Bowel sounds normal.     DIAGNOSTICS: Rapid strep Ag:  negative    ASSESSMENT/PLAN:                                                    1. Right acute otitis media  Supportive care reviewed:   Increased fluid hydration  Acetaminophen/ibuprofen as needed for pain, fever.   Nasal saline as needed for nasal congestion  Humidifier/vaporizer/moist steam suggested.      - amoxicillin (AMOXIL) 400 MG/5ML suspension; Take 12.5 mLs (1,000 mg) by mouth 2 times daily for 10 days  Dispense: 250 mL; Refill: 0    2. Throat pain  Likely viral etiology. Supportive care as above.   - Rapid strep screen negative, awaiting throat culture.     FOLLOW UP: Return to clinic as needed for persistent/worsening symptoms, reviewed.       ANTONETTE Ang CNP

## 2021-11-06 NOTE — ED PROVIDER NOTES
ED Provider Note    CHIEF COMPLAINT  Chief Complaint   Patient presents with   • Painful Urination     Patient reports two days of urinary pain and frequency. No other complaints. Right wrist in cast, reports no pain.       HPI  Lizz Saldana is a 89 y.o. female who was brought in by ambulance after her  called EMS because she could not get out of the tub.  Patient states she was simply unable to get out of the tub due to her right forearm and wrist being in a cast.  She denies having any symptoms and simply stated that because of the cast on her arm she got tired trying to get out of the tub and could not.  Her , who was 89 years old, also does not have the physical capability of lifting her out of the tub so he called 911 to help get her out of the tub.  He did not know they were going to transport her and apparently she told EMS that she urinates often.  This is not new or changed for her.  She notes no recent fatigue, malaise, and specifically denies burning with urination or blood in the urine.  She has had no nausea, vomiting, chest pain, or shortness of breath.    REVIEW OF SYSTEMS  Constitutional: No fevers or chills  Skin: No rashes, abrasions, lacerations  HEENT: No sore throat, runny nose  Neck: No neck pain  Chest: No pain   Pulm: No shortness of breath, cough  Gastrointestinal: No nausea, vomiting, diarrhea,  or abdominal pain.  Genitourinary: No dysuria or hematuria  Musculoskeletal: Mild right hand swelling, right forearm and wrist in cast.  No other extremity pain.  Neurologic: No numbness, tingling, or focal motor weakness. No confusion or disorientation.  Heme: No bleeding or bruising problems.   Immuno: No hx of recurrent infections    PAST FAM HISTORY  Family History   Problem Relation Age of Onset   • Hypertension Mother    • Other Father    • Other Sister         alzheimer       PAST MEDICAL HISTORY   has a past medical history of Arthritis (2015), Bilateral cataracts, Cancer  (HCC) (1990), DJD (degenerative joint disease), High cholesterol, HTN, breast cancer, Hyperlipidemia, Osteoporosis, Pain (7-2015), Pigmented skin lesion (4/30/2015), Postherpetic neuralgia, and Tubular adenoma.    SOCIAL HISTORY  Social History     Tobacco Use   • Smoking status: Never Smoker   • Smokeless tobacco: Never Used   Substance and Sexual Activity   • Alcohol use: No     Alcohol/week: 0.0 oz   • Drug use: No   • Sexual activity: Never     Partners: Male       SURGICAL HISTORY   has a past surgical history that includes rotator cuff repair (1990); cataract extraction with iol (2000); cyst excision (Left, 1989); mastectomy (1990); hysterectomy, total abdominal (1970); knee arthroplasty total (Right, 7/13/2015); and knee manipulation (Right, 10/12/2015).    CURRENT MEDICATIONS  Home Medications     Reviewed by Ludwin Marks R.N. (Registered Nurse) on 11/05/21 at 1842  Med List Status: Partial   Medication Last Dose Status   Cholecalciferol (VITAMIN D3) 2000 units Tab  Active   diclofenac sodium 1 % Gel  Active   felodipine (PLENDIL) 10 MG TABLET SR 24 HR  Active   fenofibrate (TRICOR) 48 MG Tab  Active   lovastatin (MEVACOR) 20 MG Tab  Active   Multiple Vitamins-Calcium (ONE-A-DAY WOMENS PO)  Active   Multiple Vitamins-Minerals (OCUVITE PRESERVISION PO)  Active                ALLERGIES  Allergies   Allergen Reactions   • Codeine Nausea   • Floxin [Ofloxacin] Itching and Swelling   • Lidoderm      rash   • Methylprednisolone Sodium Succ Rash and Itching     Painful skin blistering LE   • Toradol Unspecified     .       PHYSICAL EXAM  VITAL SIGNS: /70   Pulse 99   Temp 37.2 °C (98.9 °F) (Temporal)   Resp 18   Ht 1.524 m (5')   Wt 48.5 kg (107 lb)   SpO2 93%   BMI 20.90 kg/m²    Gen: Alert in no apparent distress.  HEENT: No signs of trauma, Bilateral external ears normal, Nose normal. Conjunctiva normal, Non-icteric.   Cardiovascular: Regular rate and rhythm.  Capillary refill less than 3  seconds to all extremities, 2+ distal pulses to all extremities with the exception of right upper extremity which cannot be measured due to a cast.  Thorax & Lungs: Normal breath sounds, No respiratory distress, No wheezing bilateral chest rise  Abdomen: Bowel sounds normal, Soft, No tenderness, No masses, No pulsatile masses. No Guarding or rebound  Skin: Warm, Dry  Extremities: Intact distal pulses, No edema.  Right fingers and thumb are very mildly edematous, nonpitting.  Patient is able to wiggle all fingers and thumb on the side and had sensation intact to light touch.  Neurologic: Alert , no facial droop, grossly normal coordination and strength  Psychiatric: Affect pleasant      LABS  Results for orders placed or performed during the hospital encounter of 11/05/21   URINALYSIS CULTURE, IF INDICATED    Specimen: Urine   Result Value Ref Range    Color Yellow     Character Clear     Specific Gravity 1.007 <1.035    Ph 6.0 5.0 - 8.0    Glucose Negative Negative mg/dL    Ketones Negative Negative mg/dL    Protein Negative Negative mg/dL    Bilirubin Negative Negative    Urobilinogen, Urine 1.0 Negative    Nitrite Negative Negative    Leukocyte Esterase Trace (A) Negative    Occult Blood Negative Negative    Micro Urine Req Microscopic    HCG QUALITATIVE UR   Result Value Ref Range    Beta-Hcg Urine Negative Negative   URINE MICROSCOPIC (W/UA)   Result Value Ref Range    WBC 2-5 /hpf    RBC 0-2 /hpf    Bacteria Negative None /hpf    Epithelial Cells Negative /hpf    Hyaline Cast 0-2 /lpf       COURSE & MEDICAL DECISION MAKING  With patient and her  note that they feel there was a miscommunication with EMS and note that her inability to get out of the tub was due to repeated attempts using her left hand only and tired her out.  She was otherwise feeling normal during the bath and has not been ill recently.  She has no symptoms currently and simply wants to go home.  She is happy that her urinalysis is normal  however.  At this point I find no evidence for an emergent issue and feel they are safe to go home with watchful waiting.    FINAL IMPRESSION  1. Observation and evaluation for suspected conditions not found        Electronically signed by: Stanley Barber M.D., 11/5/2021 8:34 PM

## 2021-11-06 NOTE — ED TRIAGE NOTES
Chief Complaint   Patient presents with   • Painful Urination     Patient reports two days of urinary pain and frequency. No other complaints. Right wrist in cast, reports no pain.     Patient BIB [facility] from home for above complaint. Patient did have some mild weakness when ambulating to the bed, otherwise has no other complaints. VSS.    Blood Pressure : 110/75, Pulse: 95, Respiration: (!) 25, Temperature: 37.2 °C (98.9 °F), Height: 152.4 cm (5'), Weight: 48.5 kg (107 lb), BMI (Calculated): 20.9, BSA (Calculated): 1.4, Pulse Oximetry: 95 %, O2 Delivery Device: None - Room Air

## 2021-11-06 NOTE — ED NOTES
Discharge instructions discussed with pt, verbalizes understanding.  PIV removed. All belongings with pt when leaving unit. Pt to lobby by WC.

## 2021-11-08 DIAGNOSIS — E78.5 DYSLIPIDEMIA: ICD-10-CM

## 2021-11-08 RX ORDER — LOVASTATIN 20 MG/1
TABLET ORAL
Qty: 90 TABLET | Refills: 2 | Status: SHIPPED | OUTPATIENT
Start: 2021-11-08 | End: 2022-08-05

## 2021-11-10 ENCOUNTER — TELEPHONE (OUTPATIENT)
Dept: MEDICAL GROUP | Facility: PHYSICIAN GROUP | Age: 86
End: 2021-11-10

## 2021-11-10 NOTE — TELEPHONE ENCOUNTER
Phone Number Called: [phone]    Call outcome: Spoke to patient regarding message below.    Message: Called to follow up with the patient after her ED visit on 11/5/2021 for urinary frequency. Patient does not have any symptoms at this time. Advised patient of recommended follow up visit after ED visit. Patient not scheduled for an appointment.

## 2021-11-23 PROBLEM — R92.30 DENSE BREAST TISSUE ON MAMMOGRAM: Status: ACTIVE | Noted: 2021-11-23

## 2022-02-08 DIAGNOSIS — E78.5 DYSLIPIDEMIA: ICD-10-CM

## 2022-02-10 RX ORDER — FENOFIBRATE 48 MG/1
TABLET, COATED ORAL
Qty: 90 TABLET | Refills: 3 | Status: SHIPPED | OUTPATIENT
Start: 2022-02-10 | End: 2023-02-06

## 2022-02-22 DIAGNOSIS — I10 HTN (HYPERTENSION), BENIGN: ICD-10-CM

## 2022-02-23 RX ORDER — FELODIPINE 10 MG/1
10 TABLET, EXTENDED RELEASE ORAL DAILY
Qty: 90 TABLET | Refills: 3 | Status: SHIPPED | OUTPATIENT
Start: 2022-02-23 | End: 2023-02-27

## 2022-02-25 NOTE — NON-PROVIDER
"Lizz Saldana is a 89 y.o. female here for a non-provider visit for:   FLU    Reason for immunization: Annual Flu Vaccine  Immunization records indicate need for vaccine: Yes, confirmed with Epic  Minimum interval has been met for this vaccine: Yes  ABN completed: Not Indicated    Order and dose verified by: Jessenia SCHUSTER Dated  08/07/2015 was given to patient: Yes  All IAC Questionnaire questions were answered \"No.\"    Patient tolerated injection and no adverse effects were observed or reported: Yes    Pt scheduled for next dose in series: No    " PAST MEDICAL HISTORY:  CAD in native artery stents 9/2019    HTN (hypertension)     Hypercholesterolemia

## 2022-08-05 DIAGNOSIS — E78.5 DYSLIPIDEMIA: ICD-10-CM

## 2022-08-08 RX ORDER — LOVASTATIN 20 MG/1
TABLET ORAL
Qty: 90 TABLET | Refills: 0 | Status: SHIPPED | OUTPATIENT
Start: 2022-08-08 | End: 2022-11-07

## 2022-09-16 ENCOUNTER — NON-PROVIDER VISIT (OUTPATIENT)
Dept: MEDICAL GROUP | Facility: PHYSICIAN GROUP | Age: 87
End: 2022-09-16
Payer: MEDICARE

## 2022-09-16 DIAGNOSIS — Z23 NEED FOR VACCINATION: ICD-10-CM

## 2022-09-16 PROCEDURE — 90662 IIV NO PRSV INCREASED AG IM: CPT | Performed by: STUDENT IN AN ORGANIZED HEALTH CARE EDUCATION/TRAINING PROGRAM

## 2022-09-16 PROCEDURE — G0008 ADMIN INFLUENZA VIRUS VAC: HCPCS | Performed by: STUDENT IN AN ORGANIZED HEALTH CARE EDUCATION/TRAINING PROGRAM

## 2022-10-05 ENCOUNTER — TELEPHONE (OUTPATIENT)
Dept: HEALTH INFORMATION MANAGEMENT | Facility: OTHER | Age: 87
End: 2022-10-05

## 2022-10-05 NOTE — TELEPHONE ENCOUNTER
Outcome: Left Message for patient to schedule an AWV    Please transfer to [facility] at [phone] when patient returns call.      Attempt # 1

## 2022-10-24 ENCOUNTER — OFFICE VISIT (OUTPATIENT)
Dept: MEDICAL GROUP | Facility: PHYSICIAN GROUP | Age: 87
End: 2022-10-24
Payer: MEDICARE

## 2022-10-24 VITALS
HEIGHT: 60 IN | TEMPERATURE: 96.6 F | SYSTOLIC BLOOD PRESSURE: 124 MMHG | WEIGHT: 100 LBS | DIASTOLIC BLOOD PRESSURE: 74 MMHG | OXYGEN SATURATION: 96 % | HEART RATE: 74 BPM | BODY MASS INDEX: 19.63 KG/M2 | RESPIRATION RATE: 14 BRPM

## 2022-10-24 DIAGNOSIS — S52.501S CLOSED FRACTURE OF DISTAL END OF RIGHT RADIUS, UNSPECIFIED FRACTURE MORPHOLOGY, SEQUELA: ICD-10-CM

## 2022-10-24 DIAGNOSIS — M25.641 STIFFNESS OF HAND JOINT, RIGHT: ICD-10-CM

## 2022-10-24 PROBLEM — S52.501A CLOSED FRACTURE OF DISTAL END OF RIGHT RADIUS: Status: ACTIVE | Noted: 2022-10-24

## 2022-10-24 PROCEDURE — 99214 OFFICE O/P EST MOD 30 MIN: CPT | Performed by: INTERNAL MEDICINE

## 2022-10-24 ASSESSMENT — PATIENT HEALTH QUESTIONNAIRE - PHQ9: CLINICAL INTERPRETATION OF PHQ2 SCORE: 0

## 2022-10-24 NOTE — PROGRESS NOTES
Established Patient    Chief Complaint   Patient presents with    Finger Pain       Subjective:     HPI:   Lizz presents today with the following.    Patient Active Problem List    Diagnosis Date Noted    Closed fracture of distal end of right radius 10/24/2022    Dense breast tissue on mammogram 11/23/2021    Macular degeneration 09/07/2012    Osteoporosis 09/09/2011    HTN (hypertension), benign 12/17/2009    Dyslipidemia     DJD (degenerative joint disease)     HX: breast cancer     Tubular adenoma     Postherpetic neuralgia     Bladder incontinence        Current Outpatient Medications on File Prior to Visit   Medication Sig Dispense Refill    lovastatin (MEVACOR) 20 MG Tab TAKE 1 TABLET DAILY 90 Tablet 0    felodipine ER (PLENDIL) 10 MG TABLET SR 24 HR Take 1 Tablet by mouth every day. 90 Tablet 3    fenofibrate (TRICOR) 48 MG Tab TAKE 1 TABLET DAILY 90 Tablet 3    diclofenac sodium 1 % Gel diclofenac 1 % topical gel      Multiple Vitamins-Minerals (OCUVITE PRESERVISION PO) Take 1 Tab by mouth 2 Times a Day. PreserVision AREDS 2      Cholecalciferol (VITAMIN D3) 2000 units Tab Take 5,000 Units by mouth every day.      Multiple Vitamins-Calcium (ONE-A-DAY WOMENS PO) Take  by mouth every day.       No current facility-administered medications on file prior to visit.       Allergies, past medical history, past surgical history, family history, social history reviewed and updated    ROS:  All other systems reviewed and are negative except as stated in the HPI       Physical Exam:     /74 (BP Location: Left arm, Patient Position: Sitting, BP Cuff Size: Adult)   Pulse 74   Temp 35.9 °C (96.6 °F) (Temporal)   Resp 14   Ht 1.524 m (5')   Wt 45.4 kg (100 lb)   SpO2 96%   BMI 19.53 kg/m²   General: Normal appearing. No distress.  Pulmonary: Clear to ausculation.  Normal effort.   Cardiovascular: Regular rate and rhythm    Assessment and Plan:     89 y.o. female with the following issues.    1. Closed  fracture of distal end of right radius, unspecified fracture morphology, sequela  - Referral to Physical Therapy    2. Stiffness of hand joint, right  - Referral to Physical Therapy    She has fracture of the right hand last year, was following up with orthopedic, currently she has some limitation of range of movement with stiffness of the right hand, denied having recent trauma or injury, on examination there is no tenderness, there is some limitation of total flexion of the fingers, educated in detail regarding conservative management as well    Follow Up:   6 months to 1 year as needed    Please note that this dictation was created using voice recognition software. I have made every reasonable attempt to correct obvious errors, but I expect that there are errors of grammar and possibly content that I did not discover before finalizing the note.    Signed by: Cristofer Cleveland M.D.

## 2022-11-06 DIAGNOSIS — E78.5 DYSLIPIDEMIA: ICD-10-CM

## 2022-11-07 RX ORDER — LOVASTATIN 20 MG/1
TABLET ORAL
Qty: 90 TABLET | Refills: 3 | Status: SHIPPED | OUTPATIENT
Start: 2022-11-07 | End: 2023-08-29

## 2022-12-12 NOTE — ED NOTES
Assist RN  Pt updated about the plan of care on getting splint application.  ED tech notified   [facility] PHYSICIAN [facility]  [address] [facility] MEDICINE  [address] [facility] [address] [address]  Dept: [phone]  Dept Fax: [phone]  Loc: [phone]    Marco Masterson is a 58 y.o. female who presents today for her medical conditions/complaints as noted below. Marco Masterson is c/o of Leg Pain (Leg leg hurts and doesn't want to bend. ) and Arm Pain        HPI:   She is a patient of Dr. Toño Morris that presents with complaints of right leg and right arm pain when she woke up this morning. She also reported numbness in the arm. She states the right leg is more pain and swelling. She states the right leg hurts and \"doesn't want to bend\". She also reports arm pain this morning but it is fine now. She reports waking up this morning with numbness and pain on right side of her body and this has happened before. She reports right leg pain and swelling. States she did not notice or have trouble with thinking, mouth or eye drooping, and slurred speech. Neurosurgeon Dr. Khadijah Ramirez, last saw him over a year ago. Declines muscle relaxer. States she has taken steroid packs in the past with no complications. HPI   Chief Complaint   Patient presents with    Leg Pain     Leg leg hurts and doesn't want to bend.      Arm Pain     Past Medical History:   Diagnosis Date    Anal fissure     Anxiety 9/15/2017    C. difficile colitis     Cervical radiculopathy 9/15/2017    Chronic back pain     Chronic kidney disease     Class 1 obesity due to excess calories with serious comorbidity and body mass index (BMI) of 32.0 to 32.9 in adult 9/15/2017    Clostridium difficile colitis 2/16/2020    Cyst of kidney, acquired 9/15/2017    Depression 9/15/2017    History of oral surgery     Hyperlipidemia     Hypertension     IBS (irritable bowel syndrome) 9/15/2017    Irregular heartbeat     Obesity 9/15/2017    Osteoarthritis     PONV (postoperative nausea and vomiting)     Retinal detachment       Past Surgical History:   Procedure Laterality Date    APPENDECTOMY       SECTION      EYE SURGERY      HYSTERECTOMY (CERVIX STATUS UNKNOWN)  1984    HYSTERECTOMY, TOTAL ABDOMINAL (CERVIX REMOVED)      LUMBAR FUSION N/A 2020    TLIF OF L4/5. L5-S1 performed by Suhail Dominique DO at [address] Bilateral     age 25    RETINAL DETACHMENT SURGERY         Vitals 2022 10/17/2022 10/6/2022 6/15/2022 2022    SYSTOLIC 176 256 [phone] 838   DIASTOLIC 72 84 76 76 88 88   Site - - - - - -   Position - - - - - -   Pulse 68 60 70 65 60 -   Temp 98.3 97.7 98 97.6 97.4 -   Resp - - 18 - - -   SpO2 96 95 95 96 97 -   Weight 242 lb 6.4 oz 241 lb 4 oz 242 lb 233 lb 4 oz 231 lb -   Height - 5' 8\" 5' 8\" 5' 8\" 5' 8\" -   Body mass index - 36.68 kg/m2 36.79 kg/m2 35.46 kg/m2 35.12 kg/m2 -   Some recent data might be hidden       Family History   Problem Relation Age of Onset    Diabetes Mother     High Blood Pressure Mother     Heart Attack Mother     Kidney Disease Father     Stroke Father     Other Father         renal failure    Alcohol Abuse Sister     Cirrhosis Sister     Diabetes Maternal Grandmother     Heart Disease Maternal Grandfather     Colon Cancer Paternal Grandmother     Stroke Paternal Aunt     Colon Cancer Maternal Cousin        Social History     Tobacco Use    Smoking status: Never    Smokeless tobacco: Never   Substance Use Topics    Alcohol use: No      Current Outpatient Medications on File Prior to Visit   Medication Sig Dispense Refill    TIADYLT  MG extended release capsule TAKE 1 CAPSULE BY MOUTH EVERY DAY 90 capsule 3    dicyclomine (BENTYL) 20 MG tablet Take 1 tablet by mouth 3 times daily (before meals) 270 tablet 1    metoprolol (LOPRESSOR) 100 MG tablet TAKE 1 TABLET BY MOUTH TWICE A  tablet 3    FLUoxetine (PROZAC) 10 MG capsule TAKE 1 CAPSULE BY MOUTH EVERY DAY 90 capsule 3    hyoscyamine (ANASPAZ;LEVSIN) 125 MCG tablet TAKE 1 TABLET BY MOUTH EVERY 6HRS AS NEEDED FOR CRAMPING OR DIARRHEA 90 tablet 3    triamterene-hydroCHLOROthiazide (MAXZIDE-25) 37.5-25 MG per tablet TAKE 1 TABLET BY MOUTH EVERY DAY 90 tablet 3    atorvastatin (LIPITOR) 20 MG tablet Take 1 tablet by mouth daily 90 tablet 3    dilTIAZem (CARDIZEM CD) 240 MG extended release capsule TAKE 1 CAPSULE BY MOUTH ONE TIME DAILY 90 capsule 3    Probiotic Product (PROBIOTIC-10 PO) Take 1 capsule by mouth daily      Estradiol-Estriol-Progesterone (BIEST/PROGESTERONE TD) Take 1 capsule by mouth nightly      Multiple Vitamins-Minerals (WOMENS MULTIVITAMIN PO) Take 1 tablet by mouth daily       No current facility-administered medications on file prior to visit. Allergies   Allergen Reactions    Latex Rash    Amoxicillin Shortness Of Breath and Nausea And Vomiting    Anaprox [Naproxen Sodium] Shortness Of Breath and Nausea And Vomiting    Aloe Vera      Other reaction(s): Unknown    Nortriptyline Hcl      Doesn't remember      Augmentin [Amoxicillin-Pot Clavulanate] Nausea And Vomiting           Buspar [Buspirone] Nausea And Vomiting    Vibramycin [Doxycycline Calcium] Nausea And Vomiting       Health Maintenance   Topic Date Due    Colorectal Cancer Screen  Never done    COVID-19 Vaccine (3 - Booster for Moderna series) 08/09/2021    Shingles vaccine (1 of 2) 04/29/2023 (Originally 10/12/2010)    Hepatitis C screen  04/29/2023 (Originally 10/12/1978)    HIV screen  04/29/2023 (Originally 10/12/1975)    A1C test (Diabetic or Prediabetic)  10/17/2023    Depression Monitoring  10/17/2023    Breast cancer screen  06/21/2024    DTaP/Tdap/Td vaccine (2 - Td or Tdap) 05/06/2026    Lipids  10/17/2027    Flu vaccine  Completed    Hepatitis A vaccine  Aged Out    Hib vaccine  Aged Out    Meningococcal (ACWY) vaccine  Aged Out    Pneumococcal 0-64 years Vaccine  Aged Out       Subjective   SUBJECTIVE/OBJECTIVE:  @HPI@    Review of Systems   Constitutional: Negative. HENT: Negative. Eyes: Negative.     Respiratory: Negative. Cardiovascular: Negative. Gastrointestinal: Negative. Endocrine: Negative. Genitourinary: Negative. Musculoskeletal:  Positive for arthralgias (leg and arm pain) and joint swelling (right knee). Skin: Negative. Allergic/Immunologic: Negative. Neurological: Negative. Hematological: Negative. Psychiatric/Behavioral: Negative. Objective   Physical Exam  Vitals and nursing note reviewed. Constitutional:       Appearance: Normal appearance. She is obese. HENT:      Head: Normocephalic. Nose: Nose normal.   Eyes:      General:         Right eye: No discharge. Left eye: No discharge. Cardiovascular:      Rate and Rhythm: Normal rate and regular rhythm. Pulses: Normal pulses. Heart sounds: Normal heart sounds. Pulmonary:      Effort: Pulmonary effort is normal.      Breath sounds: Normal breath sounds. No wheezing or rhonchi. Abdominal:      General: Abdomen is flat. Bowel sounds are normal.      Palpations: Abdomen is soft. Tenderness: There is no abdominal tenderness. Musculoskeletal:      Cervical back: Normal range of motion. Right knee: Swelling present. Decreased range of motion (due to pain). Tenderness present. Right lower le+ Edema present. Left lower leg: No edema. Skin:     General: Skin is warm and dry. Neurological:      Mental Status: She is alert and oriented to person, place, and time. Psychiatric:         Mood and Affect: Mood normal.         Behavior: Behavior normal.         Thought Content: Thought content normal.         Judgment: Judgment normal.          ASSESSMENT/PLAN:  1. Pain and swelling of right lower extremity    Return for keep follow up with PCP. More than 50% of the time was spent counseling and coordinating care for a total time of 20-25 min face to face.   Increase water intake  Elevate feet and legs  Prednisone 10mg bid x 5 days  Patient deferred muscle relaxer  Advised to go to ER if she has numbness or tingling that returns or persists. PDMP Monitoring:    Last PDMP Reji Castellon as Reviewed:  Review User Review Instant Review Result            Urine Drug Screenings (1 yr)       POCT Rapid Drug Screen  Collected: 6/15/2022 (Final result)              POCT Rapid Drug Screen  Collected: 3/30/2021 (Edited Result - FINAL)              POCT Rapid Drug Screen  Collected: 9/12/2019  4:55 PM (Final result)                  Medication Contract and Consent for Opioid Use Documents Filed       Patient Documents       Type of Document Status Date Received Received By Description    Medication Contract Received 12/17/2018  9:34 AM Celester Paola Signed    Medication Contract Received 3/30/2021  4:21 PM [address] [facility] [place], [address] [facility]  3/30/21 med contract    Medication Contract Received 6/15/2022  3:49 PM TOMMY IRWIN 6/15/22 med contract                     Patient given educational materials -see patient instructions. Discussed use, benefit, and side effects of prescribed medications. All patient questions answered. Pt voiced understanding. Reviewed health maintenance. Instructed to continue currentmedications, diet and exercise. Patient agreed with treatment plan. Follow up as directed. MEDICATIONS:  Orders Placed This Encounter   Medications    dexamethasone (DECADRON) injection 5 mg    predniSONE (DELTASONE) 10 MG tablet     Sig: Take 1 tablet by mouth 2 times daily for 5 days     Dispense:  10 tablet     Refill:  0         ORDERS:  No orders of the defined types were placed in this encounter. Follow-up:  Return for keep follow up with PCP. PATIENT INSTRUCTIONS:  Patient Instructions   Increase water intake  Elevate feet and legs  Prednisone 10mg bid x 5 days  Advised to go to ER if she has numbness or tingling that returns.    Electronically signed by SORIN Early on 12/12/2022 at 3:27 PM    EMR Dragon/transcription disclaimer:  Much of thisencounter note is electronic transcription/translation of spoken language to printed texts. The electronic translation of spoken language may be erroneous, or at times, nonsensical words or phrases may be inadvertentlytranscribed.   Although I have reviewed the note for such errors, some may still exist.

## 2023-01-13 ENCOUNTER — TELEPHONE (OUTPATIENT)
Dept: MEDICAL GROUP | Facility: PHYSICIAN GROUP | Age: 88
End: 2023-01-13
Payer: MEDICARE

## 2023-01-13 NOTE — TELEPHONE ENCOUNTER
Pt's spouse LVM requesting orders to be reinstated for PT on the wrist. Please order is appropriate.

## 2023-01-17 DIAGNOSIS — S52.501S CLOSED FRACTURE OF DISTAL END OF RIGHT RADIUS, UNSPECIFIED FRACTURE MORPHOLOGY, SEQUELA: ICD-10-CM

## 2023-01-26 DIAGNOSIS — S52.501S CLOSED FRACTURE OF DISTAL END OF RIGHT RADIUS, UNSPECIFIED FRACTURE MORPHOLOGY, SEQUELA: ICD-10-CM

## 2023-02-05 DIAGNOSIS — E78.5 DYSLIPIDEMIA: ICD-10-CM

## 2023-02-06 RX ORDER — FENOFIBRATE 48 MG/1
TABLET, COATED ORAL
Qty: 90 TABLET | Refills: 3 | Status: SHIPPED | OUTPATIENT
Start: 2023-02-06 | End: 2024-02-22

## 2023-02-26 DIAGNOSIS — I10 HTN (HYPERTENSION), BENIGN: ICD-10-CM

## 2023-02-27 RX ORDER — FELODIPINE 10 MG/1
TABLET, EXTENDED RELEASE ORAL
Qty: 90 TABLET | Refills: 3 | Status: ON HOLD | OUTPATIENT
Start: 2023-02-27 | End: 2024-02-26

## 2023-04-16 NOTE — TELEPHONE ENCOUNTER
Will switch gemfibrozil to Tricor which has lower risk of muscle side effects when taken with a statin medication. Does she want me to send this to [facility] or local pharmacy? Should still have 2 90-day refills left of the lovastatin.  Love Lassiter P.A.-C.     None

## 2023-05-17 NOTE — ASSESSMENT & PLAN NOTE
Chronic issue, stable per patient. Endorses ongoing leakage and urinary frequency but isn't significantly bothered by symptoms to want medication. We discussed that Kegal exercises can be helpful. Will continue to monitor.   Pt is making gains however spouse is concerned about the level of assist potentially needed on dc, and is now preferring pt continue with inpat services at a subacute setting  Anticipate dc within the week to selected facility

## 2023-06-14 ENCOUNTER — TELEPHONE (OUTPATIENT)
Dept: HEALTH INFORMATION MANAGEMENT | Facility: OTHER | Age: 88
End: 2023-06-14
Payer: MEDICARE

## 2023-07-24 NOTE — PROGRESS NOTES
"Lizz Saldana is a 89 y.o. female here for a non-provider visit for:   Flu vaccine      Reason for immunization: Annual Flu Vaccine  Immunization records indicate need for vaccine: Yes, confirmed with Epic  Minimum interval has been met for this vaccine: Yes  ABN completed: No    VIS Dated  09/16/2022 was given to patient: Yes  All IAC Questionnaire questions were answered \"No.\"    Patient tolerated injection and no adverse effects were observed or reported: Yes    Pt scheduled for next dose in series: Not Indicated   " Problem: MOBILITY - ADULT  Goal: Maintain or return to baseline ADL function  Description: INTERVENTIONS:  -  Assess patient's ability to carry out ADLs; assess patient's baseline for ADL function and identify physical deficits which impact ability to perform ADLs (bathing, care of mouth/teeth, toileting, grooming, dressing, etc.)  - Assess/evaluate cause of self-care deficits   - Assess range of motion  - Assess patient's mobility; develop plan if impaired  - Assess patient's need for assistive devices and provide as appropriate  - Encourage maximum independence but intervene and supervise when necessary  - Involve family in performance of ADLs  - Assess for home care needs following discharge   - Consider OT consult to assist with ADL evaluation and planning for discharge  - Provide patient education as appropriate  Outcome: Progressing  Goal: Maintains/Returns to pre admission functional level  Description: INTERVENTIONS:  - Perform BMAT or MOVE assessment daily.   - Set and communicate daily mobility goal to care team and patient/family/caregiver. - Collaborate with rehabilitation services on mobility goals if consulted  - Perform Range of Motion 3 times a day. - Reposition patient every 2 hours.   - Dangle patient 3 times a day  - Stand patient 3 times a day  - Ambulate patient 3 times a day  - Out of bed to chair 3 times a day   - Out of bed for meals 3 times a day  - Out of bed for toileting  - Record patient progress and toleration of activity level   Outcome: Progressing     Problem: PAIN - ADULT  Goal: Verbalizes/displays adequate comfort level or baseline comfort level  Description: Interventions:  - Encourage patient to monitor pain and request assistance  - Assess pain using appropriate pain scale  - Administer analgesics based on type and severity of pain and evaluate response  - Implement non-pharmacological measures as appropriate and evaluate response  - Consider cultural and social influences on pain and pain management  - Notify physician/advanced practitioner if interventions unsuccessful or patient reports new pain  Outcome: Progressing     Problem: INFECTION - ADULT  Goal: Absence or prevention of progression during hospitalization  Description: INTERVENTIONS:  - Assess and monitor for signs and symptoms of infection  - Monitor lab/diagnostic results  - Monitor all insertion sites, i.e. indwelling lines, tubes, and drains  - Monitor endotracheal if appropriate and nasal secretions for changes in amount and color  - Lawrence appropriate cooling/warming therapies per order  - Administer medications as ordered  - Instruct and encourage patient and family to use good hand hygiene technique  - Identify and instruct in appropriate isolation precautions for identified infection/condition  Outcome: Progressing     Problem: DISCHARGE PLANNING  Goal: Discharge to home or other facility with appropriate resources  Description: INTERVENTIONS:  - Identify barriers to discharge w/patient and caregiver  - Arrange for needed discharge resources and transportation as appropriate  - Identify discharge learning needs (meds, wound care, etc.)  - Arrange for interpretive services to assist at discharge as needed  - Refer to Case Management Department for coordinating discharge planning if the patient needs post-hospital services based on physician/advanced practitioner order or complex needs related to functional status, cognitive ability, or social support system  Outcome: Progressing     Problem: Knowledge Deficit  Goal: Patient/family/caregiver demonstrates understanding of disease process, treatment plan, medications, and discharge instructions  Description: Complete learning assessment and assess knowledge base.   Interventions:  - Provide teaching at level of understanding  - Provide teaching via preferred learning methods  Outcome: Progressing     Problem: CARDIOVASCULAR - ADULT  Goal: Maintains optimal cardiac output and hemodynamic stability  Description: INTERVENTIONS:  - Monitor I/O, vital signs and rhythm  - Monitor for S/S and trends of decreased cardiac output  - Administer and titrate ordered vasoactive medications to optimize hemodynamic stability  - Assess quality of pulses, skin color and temperature  - Assess for signs of decreased coronary artery perfusion  - Instruct patient to report change in severity of symptoms  Outcome: Progressing  Goal: Absence of cardiac dysrhythmias or at baseline rhythm  Description: INTERVENTIONS:  - Continuous cardiac monitoring, vital signs, obtain 12 lead EKG if ordered  - Administer antiarrhythmic and heart rate control medications as ordered  - Monitor electrolytes and administer replacement therapy as ordered  Outcome: Progressing     Problem: RESPIRATORY - ADULT  Goal: Achieves optimal ventilation and oxygenation  Description: INTERVENTIONS:  - Assess for changes in respiratory status  - Assess for changes in mentation and behavior  - Position to facilitate oxygenation and minimize respiratory effort  - Oxygen administered by appropriate delivery if ordered  - Initiate smoking cessation education as indicated  - Encourage broncho-pulmonary hygiene including cough, deep breathe, Incentive Spirometry  - Assess the need for suctioning and aspirate as needed  - Assess and instruct to report SOB or any respiratory difficulty  - Respiratory Therapy support as indicated  Outcome: Progressing     Problem: SAFETY,RESTRAINT: NV/NON-SELF DESTRUCTIVE BEHAVIOR  Goal: Remains free of harm/injury (restraint for non violent/non self-detsructive behavior)  Description: INTERVENTIONS:  - Instruct patient/family regarding restraint use   - Assess and monitor physiologic and psychological status   - Provide interventions and comfort measures to meet assessed patient needs   - Identify and implement measures to help patient regain control  - Assess readiness for release of restraint   Outcome: Progressing  Goal: Returns to optimal restraint-free functioning  Description: INTERVENTIONS:  - Assess the patient's behavior and symptoms that indicate continued need for restraint  - Identify and implement measures to help patient regain control  - Assess readiness for release of restraint   Outcome: Progressing     Problem: Prexisting or High Potential for Compromised Skin Integrity  Goal: Skin integrity is maintained or improved  Description: INTERVENTIONS:  - Identify patients at risk for skin breakdown  - Assess and monitor skin integrity  - Assess and monitor nutrition and hydration status  - Monitor labs   - Assess for incontinence   - Turn and reposition patient  - Assist with mobility/ambulation  - Relieve pressure over bony prominences  - Avoid friction and shearing  - Provide appropriate hygiene as needed including keeping skin clean and dry  - Evaluate need for skin moisturizer/barrier cream  - Collaborate with interdisciplinary team   - Patient/family teaching  - Consider wound care consult   Outcome: Progressing     Problem: Nutrition/Hydration-ADULT  Goal: Nutrient/Hydration intake appropriate for improving, restoring or maintaining nutritional needs  Description: Monitor and assess patient's nutrition/hydration status for malnutrition. Collaborate with interdisciplinary team and initiate plan and interventions as ordered. Monitor patient's weight and dietary intake as ordered or per policy. Utilize nutrition screening tool and intervene as necessary. Determine patient's food preferences and provide high-protein, high-caloric foods as appropriate.      INTERVENTIONS:  - Monitor oral intake, urinary output, labs, and treatment plans  - Assess nutrition and hydration status and recommend course of action  - Evaluate amount of meals eaten  - Assist patient with eating if necessary   - Allow adequate time for meals  - Recommend/ encourage appropriate diets, oral nutritional supplements, and vitamin/mineral supplements  - Order, calculate, and assess calorie counts as needed  - Recommend, monitor, and adjust tube feedings and TPN/PPN based on assessed needs  - Assess need for intravenous fluids  - Provide specific nutrition/hydration education as appropriate  - Include patient/family/caregiver in decisions related to nutrition  Outcome: Progressing     Problem: Potential for Falls  Goal: Patient will remain free of falls  Description: INTERVENTIONS:  - Educate patient/family on patient safety including physical limitations  - Instruct patient to call for assistance with activity   - Consult OT/PT to assist with strengthening/mobility   - Keep Call bell within reach  - Keep bed low and locked with side rails adjusted as appropriate  - Keep care items and personal belongings within reach  - Initiate and maintain comfort rounds  - Make Fall Risk Sign visible to staff  - Offer Toileting every x Hours, in advance of need  - Initiate/Maintain xalarm  - Obtain necessary fall risk management equipment: x  - Apply yellow socks and bracelet for high fall risk patients  - Consider moving patient to room near nurses station  Outcome: Progressing

## 2023-08-25 ENCOUNTER — HOSPITAL ENCOUNTER (OUTPATIENT)
Dept: LAB | Facility: MEDICAL CENTER | Age: 88
End: 2023-08-25
Payer: MEDICARE

## 2023-08-25 DIAGNOSIS — Z11.1 SCREENING-PULMONARY TB: ICD-10-CM

## 2023-08-29 ENCOUNTER — APPOINTMENT (OUTPATIENT)
Dept: LAB | Facility: MEDICAL CENTER | Age: 88
End: 2023-08-29
Payer: MEDICARE

## 2023-08-29 ENCOUNTER — OFFICE VISIT (OUTPATIENT)
Dept: MEDICAL GROUP | Facility: PHYSICIAN GROUP | Age: 88
End: 2023-08-29
Payer: MEDICARE

## 2023-08-29 VITALS
OXYGEN SATURATION: 93 % | HEART RATE: 97 BPM | BODY MASS INDEX: 17.87 KG/M2 | DIASTOLIC BLOOD PRESSURE: 64 MMHG | WEIGHT: 91 LBS | SYSTOLIC BLOOD PRESSURE: 138 MMHG | RESPIRATION RATE: 16 BRPM | TEMPERATURE: 98.3 F | HEIGHT: 60 IN

## 2023-08-29 DIAGNOSIS — Z02.89 ENCOUNTER FOR COMPLETION OF FORM WITH PATIENT: ICD-10-CM

## 2023-08-29 DIAGNOSIS — Z13.0 SCREENING FOR DEFICIENCY ANEMIA: ICD-10-CM

## 2023-08-29 DIAGNOSIS — E78.5 DYSLIPIDEMIA: ICD-10-CM

## 2023-08-29 DIAGNOSIS — H35.30 MACULAR DEGENERATION, UNSPECIFIED LATERALITY, UNSPECIFIED TYPE: ICD-10-CM

## 2023-08-29 DIAGNOSIS — I10 HTN (HYPERTENSION), BENIGN: ICD-10-CM

## 2023-08-29 DIAGNOSIS — Z76.89 ENCOUNTER TO ESTABLISH CARE: ICD-10-CM

## 2023-08-29 DIAGNOSIS — M81.0 OSTEOPOROSIS, UNSPECIFIED OSTEOPOROSIS TYPE, UNSPECIFIED PATHOLOGICAL FRACTURE PRESENCE: ICD-10-CM

## 2023-08-29 PROBLEM — S52.501A CLOSED FRACTURE OF DISTAL END OF RIGHT RADIUS: Status: RESOLVED | Noted: 2022-10-24 | Resolved: 2023-08-29

## 2023-08-29 PROCEDURE — 99214 OFFICE O/P EST MOD 30 MIN: CPT

## 2023-08-29 PROCEDURE — 3075F SYST BP GE 130 - 139MM HG: CPT

## 2023-08-29 PROCEDURE — 3078F DIAST BP <80 MM HG: CPT

## 2023-08-29 ASSESSMENT — PATIENT HEALTH QUESTIONNAIRE - PHQ9: CLINICAL INTERPRETATION OF PHQ2 SCORE: 0

## 2023-08-29 ASSESSMENT — ENCOUNTER SYMPTOMS: FALLS: 1

## 2023-08-29 NOTE — PROGRESS NOTES
Subjective:     CC: Diagnoses of HTN (hypertension), benign, Dyslipidemia, Macular degeneration, unspecified laterality, unspecified type, Osteoporosis, unspecified osteoporosis type, unspecified pathological fracture presence, Screening for deficiency anemia, Encounter for completion of form with patient, and Encounter to establish care were pertinent to this visit.    Patient presents today to establish care with me. Prior PCP Cristofer.   She is accompanied by her spouse AJ and close family friend/caregiver.  Her and AJ are looking to moving into a memory care facility.  Requesting paperwork to be filled out and physical exam.    HPI:   Lizz presents today with    Problem   Encounter to Establish Care   Macular Degeneration    Dr. Yash Dyer     Osteoporosis    DEXA 2013  L-spine T score of -1.7   left femur  T score of -2.4  denosumab 1.28.15 after approx 5 yrs oral treatment with fosamax per review of EPIC, pt does not recall.     Htn (Hypertension), Benign    Managed with felodipine 5 mg daily.     Dyslipidemia    Managed with lovastatin 20 mg and gemfibrozil 600 mg daily.     Closed Fracture of Distal End of Right Radius (Resolved)    2021, was seen by orthopedic       ROS:  Review of Systems   Musculoskeletal:  Positive for falls (last fall 2 years ago. using walker/cane to assist/prevent falls) and joint pain (bilateral knees, arthritis).   All other systems reviewed and are negative.      Objective:     Exam:  /64 (BP Location: Right arm, Patient Position: Sitting, BP Cuff Size: Small adult)   Pulse 97   Temp 36.8 °C (98.3 °F) (Temporal)   Resp 16   Ht 1.524 m (5')   Wt 41.3 kg (91 lb)   SpO2 93%   BMI 17.77 kg/m²  Body mass index is 17.77 kg/m².    Physical Exam  Vitals reviewed.   Constitutional:       General: She is not in acute distress.     Appearance: Normal appearance. She is underweight. She is not ill-appearing.   HENT:      Head: Normocephalic and atraumatic.      Right Ear: Tympanic  membrane, ear canal and external ear normal.      Left Ear: Tympanic membrane, ear canal and external ear normal.   Eyes:      Extraocular Movements: Extraocular movements intact.      Pupils: Pupils are equal, round, and reactive to light.   Cardiovascular:      Rate and Rhythm: Normal rate and regular rhythm.      Pulses: Normal pulses.      Heart sounds: Normal heart sounds. No murmur heard.  Pulmonary:      Effort: Pulmonary effort is normal. No respiratory distress.      Breath sounds: Normal breath sounds. No wheezing.   Abdominal:      General: Bowel sounds are normal. There is no distension.      Palpations: Abdomen is soft.      Tenderness: There is no abdominal tenderness. There is no guarding.   Musculoskeletal:         General: Swelling present.      Right knee: Swelling present. Decreased range of motion.      Left knee: Swelling present. Decreased range of motion.      Right lower leg: Edema (trace) present.      Left lower leg: Edema (trace) present.   Skin:     General: Skin is warm.      Capillary Refill: Capillary refill takes less than 2 seconds.   Neurological:      General: No focal deficit present.      Mental Status: She is alert and oriented to person, place, and time.      Cranial Nerves: No cranial nerve deficit.      Sensory: No sensory deficit.      Motor: Weakness present.      Gait: Gait abnormal (ambulates with cane for support/stability).   Psychiatric:         Mood and Affect: Mood normal.         Behavior: Behavior normal.       Assessment & Plan:     89 y.o. female with the following -     Problem List Items Addressed This Visit       Dyslipidemia     Chronic, stable.  Continue fenofibrate 48 mg daily         Relevant Orders    Lipid Profile    HTN (hypertension), benign     Chronic, stable.  Continue felodipine 10 mg daily         Relevant Orders    Comp Metabolic Panel    Osteoporosis     Chronic, continues to take vitamin D and multivitamin with calcium daily         Relevant  Orders    DS-BONE DENSITY STUDY (DEXA)    VITAMIN D,25 HYDROXY (DEFICIENCY)    Macular degeneration     Chronic, stable.  Continue to follow-up with ophthalmology as needed.  Ocuvite 1 tablet twice daily         Encounter to establish care     Other Visit Diagnoses       Screening for deficiency anemia        Relevant Orders    CBC WITHOUT DIFFERENTIAL    Encounter for completion of form with patient              Patient was educated in proper administration of medication(s) ordered today including safety, possible SE, risks, benefits, rationale and alternatives to therapy.   Supportive care, differential diagnoses, and indications for immediate follow-up discussed with patient.    Pathogenesis of diagnosis discussed including typical length and natural progression.    Instructed to return to clinic or nearest emergency department for any change in condition, further concerns, or worsening of symptoms.  Patient states understanding of the plan of care and discharge instructions.    Return in about 3 months (around 11/29/2023) for Annual Wellness, Labs.    Please note that this dictation was created using voice recognition software. I have made every reasonable attempt to correct obvious errors, but I expect that there are errors of grammar and possibly content that I did not discover before finalizing the note.

## 2023-08-29 NOTE — ASSESSMENT & PLAN NOTE
Chronic, stable.  Continue to follow-up with ophthalmology as needed.  Ocuvite 1 tablet twice daily

## 2023-08-30 ENCOUNTER — HOSPITAL ENCOUNTER (OUTPATIENT)
Dept: LAB | Facility: MEDICAL CENTER | Age: 88
End: 2023-08-30
Payer: MEDICARE

## 2023-08-30 DIAGNOSIS — Z11.1 SCREENING-PULMONARY TB: ICD-10-CM

## 2023-08-30 DIAGNOSIS — Z13.0 SCREENING FOR DEFICIENCY ANEMIA: ICD-10-CM

## 2023-08-30 DIAGNOSIS — M81.0 OSTEOPOROSIS, UNSPECIFIED OSTEOPOROSIS TYPE, UNSPECIFIED PATHOLOGICAL FRACTURE PRESENCE: ICD-10-CM

## 2023-08-30 DIAGNOSIS — I10 HTN (HYPERTENSION), BENIGN: ICD-10-CM

## 2023-08-30 DIAGNOSIS — E78.5 DYSLIPIDEMIA: ICD-10-CM

## 2023-08-30 LAB
25(OH)D3 SERPL-MCNC: 142 NG/ML (ref 30–100)
ALBUMIN SERPL BCP-MCNC: 4.6 G/DL (ref 3.2–4.9)
ALBUMIN/GLOB SERPL: 1.7 G/DL
ALP SERPL-CCNC: 89 U/L (ref 30–99)
ALT SERPL-CCNC: 41 U/L (ref 2–50)
ANION GAP SERPL CALC-SCNC: 12 MMOL/L (ref 7–16)
AST SERPL-CCNC: 41 U/L (ref 12–45)
BILIRUB SERPL-MCNC: 0.9 MG/DL (ref 0.1–1.5)
BUN SERPL-MCNC: 22 MG/DL (ref 8–22)
CALCIUM ALBUM COR SERPL-MCNC: 9.2 MG/DL (ref 8.5–10.5)
CALCIUM SERPL-MCNC: 9.7 MG/DL (ref 8.5–10.5)
CHLORIDE SERPL-SCNC: 102 MMOL/L (ref 96–112)
CHOLEST SERPL-MCNC: 237 MG/DL (ref 100–199)
CO2 SERPL-SCNC: 26 MMOL/L (ref 20–33)
CREAT SERPL-MCNC: 0.51 MG/DL (ref 0.5–1.4)
ERYTHROCYTE [DISTWIDTH] IN BLOOD BY AUTOMATED COUNT: 48.4 FL (ref 35.9–50)
GFR SERPLBLD CREATININE-BSD FMLA CKD-EPI: 85 ML/MIN/1.73 M 2
GLOBULIN SER CALC-MCNC: 2.7 G/DL (ref 1.9–3.5)
GLUCOSE SERPL-MCNC: 92 MG/DL (ref 65–99)
HCT VFR BLD AUTO: 44.9 % (ref 37–47)
HDLC SERPL-MCNC: 77 MG/DL
HGB BLD-MCNC: 14.7 G/DL (ref 12–16)
LDLC SERPL CALC-MCNC: 141 MG/DL
MCH RBC QN AUTO: 30.4 PG (ref 27–33)
MCHC RBC AUTO-ENTMCNC: 32.7 G/DL (ref 32.2–35.5)
MCV RBC AUTO: 92.8 FL (ref 81.4–97.8)
PLATELET # BLD AUTO: 316 K/UL (ref 164–446)
PMV BLD AUTO: 10.2 FL (ref 9–12.9)
POTASSIUM SERPL-SCNC: 4.4 MMOL/L (ref 3.6–5.5)
PROT SERPL-MCNC: 7.3 G/DL (ref 6–8.2)
RBC # BLD AUTO: 4.84 M/UL (ref 4.2–5.4)
SODIUM SERPL-SCNC: 140 MMOL/L (ref 135–145)
TRIGL SERPL-MCNC: 93 MG/DL (ref 0–149)
WBC # BLD AUTO: 7 K/UL (ref 4.8–10.8)

## 2023-08-30 PROCEDURE — 80053 COMPREHEN METABOLIC PANEL: CPT

## 2023-08-30 PROCEDURE — 80061 LIPID PANEL: CPT

## 2023-08-30 PROCEDURE — 36415 COLL VENOUS BLD VENIPUNCTURE: CPT

## 2023-08-30 PROCEDURE — 86480 TB TEST CELL IMMUN MEASURE: CPT

## 2023-08-30 PROCEDURE — 82306 VITAMIN D 25 HYDROXY: CPT

## 2023-08-30 PROCEDURE — 85027 COMPLETE CBC AUTOMATED: CPT

## 2023-09-01 LAB
GAMMA INTERFERON BACKGROUND BLD IA-ACNC: 0.1 IU/ML
M TB IFN-G BLD-IMP: NEGATIVE
M TB IFN-G CD4+ BCKGRND COR BLD-ACNC: -0.05 IU/ML
MITOGEN IGNF BCKGRD COR BLD-ACNC: >10 IU/ML
QFT TB2 - NIL TBQ2: -0.03 IU/ML

## 2023-09-05 ENCOUNTER — TELEPHONE (OUTPATIENT)
Dept: MEDICAL GROUP | Facility: PHYSICIAN GROUP | Age: 88
End: 2023-09-05
Payer: MEDICARE

## 2023-09-05 NOTE — TELEPHONE ENCOUNTER
----- Message from TREVA Mcmillan sent at 9/1/2023  5:19 PM PDT -----  Too much vitamin D, reduce dose.  We will go over everything else at next visit.

## 2023-09-05 NOTE — TELEPHONE ENCOUNTER
Phone Number Called: [phone] (home)     Call outcome: Spoke to patient regarding message below.    Message: Vitamin D levels were high, she needs to reduce dose.

## 2023-11-14 ENCOUNTER — OFFICE VISIT (OUTPATIENT)
Dept: MEDICAL GROUP | Facility: PHYSICIAN GROUP | Age: 88
End: 2023-11-14
Payer: MEDICARE

## 2023-11-14 VITALS
TEMPERATURE: 96.4 F | HEART RATE: 78 BPM | OXYGEN SATURATION: 96 % | SYSTOLIC BLOOD PRESSURE: 118 MMHG | DIASTOLIC BLOOD PRESSURE: 64 MMHG | BODY MASS INDEX: 17.87 KG/M2 | RESPIRATION RATE: 20 BRPM | HEIGHT: 60 IN | WEIGHT: 91 LBS

## 2023-11-14 DIAGNOSIS — B07.0 PLANTAR WART, RIGHT FOOT: ICD-10-CM

## 2023-11-14 DIAGNOSIS — M81.0 OSTEOPOROSIS, UNSPECIFIED OSTEOPOROSIS TYPE, UNSPECIFIED PATHOLOGICAL FRACTURE PRESENCE: ICD-10-CM

## 2023-11-14 DIAGNOSIS — I10 HTN (HYPERTENSION), BENIGN: ICD-10-CM

## 2023-11-14 DIAGNOSIS — H35.30 MACULAR DEGENERATION, UNSPECIFIED LATERALITY, UNSPECIFIED TYPE: ICD-10-CM

## 2023-11-14 DIAGNOSIS — E78.5 DYSLIPIDEMIA: ICD-10-CM

## 2023-11-14 PROBLEM — Z76.89 ENCOUNTER TO ESTABLISH CARE: Status: RESOLVED | Noted: 2023-08-29 | Resolved: 2023-11-14

## 2023-11-14 PROCEDURE — 3078F DIAST BP <80 MM HG: CPT

## 2023-11-14 PROCEDURE — 3074F SYST BP LT 130 MM HG: CPT

## 2023-11-14 PROCEDURE — G0439 PPPS, SUBSEQ VISIT: HCPCS

## 2023-11-14 ASSESSMENT — PATIENT HEALTH QUESTIONNAIRE - PHQ9: CLINICAL INTERPRETATION OF PHQ2 SCORE: 0

## 2023-11-14 ASSESSMENT — ENCOUNTER SYMPTOMS: GENERAL WELL-BEING: GOOD

## 2023-11-14 ASSESSMENT — ACTIVITIES OF DAILY LIVING (ADL)
BATHING_REQUIRES_ASSISTANCE: 0
SHOPPING_COMMENTS: PT DOESN'T DRIVE

## 2023-11-14 ASSESSMENT — FIBROSIS 4 INDEX: FIB4 SCORE: 1.95

## 2023-11-14 NOTE — PROGRESS NOTES
Chief Complaint   Patient presents with    Annual Exam     Lizz Presents today accompanied by her  CHRISTIAN and friend Bri.    HPI:  Lizz Saldana is a 89 y.o. here for Medicare Annual Wellness Visit     Patient Active Problem List    Diagnosis Date Noted    Plantar wart, right foot 11/14/2023    Dense breast tissue on mammogram 11/23/2021    Macular degeneration 09/07/2012    Osteoporosis 09/09/2011    HTN (hypertension), benign 12/17/2009    Dyslipidemia     DJD (degenerative joint disease)     HX: breast cancer     Tubular adenoma     Postherpetic neuralgia     Bladder incontinence        Current Outpatient Medications   Medication Sig Dispense Refill    felodipine ER (PLENDIL) 10 MG TABLET SR 24 HR TAKE 1 TABLET DAILY 90 Tablet 3    fenofibrate (TRICOR) 48 MG Tab TAKE 1 TABLET DAILY 90 Tablet 3    Multiple Vitamins-Minerals (OCUVITE PRESERVISION PO) Take 1 Tab by mouth 2 Times a Day. PreserVision AREDS 2      Cholecalciferol (VITAMIN D3) 2000 units Tab Take 5,000 Units by mouth every day.      Multiple Vitamins-Calcium (ONE-A-DAY WOMENS PO) Take  by mouth every day.       No current facility-administered medications for this visit.          Current supplements as per medication list.     Allergies: Codeine, Floxin [ofloxacin], Lidoderm, Methylprednisolone sodium succ, and Toradol    Current social contact/activities: living at senior living facility; feeling bored, nothing to do.      She  reports that she has never smoked. She has never used smokeless tobacco. She reports that she does not drink alcohol and does not use drugs.  Counseling given: Not Answered    ROS:    Gait: Uses no assistive device  Ostomy: No  Other tubes: No  Amputations: No  Chronic oxygen use: No  Last eye exam: Pt doesn't remember but about 1 yr ago  Wears hearing aids: No   : Denies any urinary leakage during the last 6 months    Screening:    Depression Screening  Little interest or pleasure in doing things?  0 0 - not at  all  Feeling down, depressed , or hopeless? 00 - not at all  Patient Health Questionnaire Score:  0    If depressive symptoms identified deferred to follow up visit unless specifically addressed in assessment and plan.    Interpretation of PHQ-9 Total Score   Score Severity   1-4 No Depression   5-9 Mild Depression   10-14 Moderate Depression   15-19 Moderately Severe Depression   20-27 Severe Depression    Screening for Cognitive Impairment  Do you or any of your friends or family members have any concern about your memory? Nono  Three Minute Recall (Banana, Sunrise, Chair)  3/3    Tesfaye clock face with all 12 numbers and set the hands to show 20 past 8.   yes    Cognitive concerns identified deferred for follow up unless specifically addressed in assessment and plan. No concerns    Fall Risk Assessment  Has the patient had two or more falls in the last year or any fall with injury in the last year?  noNo    Safety Assessment  Do you always wear your seatbelt? yes No  Any changes to home needed to function safely? No/ [facility]  Difficulty hearing.  Nono  Patient counseled about all safety risks that were identified.    Functional Assessment ADLs  Are there any barriers preventing you from cooking for yourself or meeting nutritional needs?  NoProvided by facility   Are there any barriers preventing you from driving safely or obtaining transportation?  Nono    Are there any barriers preventing you from using a telephone or calling for help?  No no   Are there any barriers preventing you from shopping?  Nono Pt doesn't drive   Are there any barriers preventing you from taking care of your own finances?  No no   Are there any barriers preventing you from managing your medications?  No no   Are there any barriers preventing you from showering, bathing or dressing yourself? Nono    Are there any barriers preventing you from doing housework or laundry? No no   Are there any barriers preventing you from  using the toilet?Nono    Are you currently engaging in any exercise or physical activity?  Yes.yes, gym in facility      Self-Assessment of Health  What is your perception of your health? Goodgood    Do you sleep more than six hours a night? Yes No; 3-4 at night, napping during dayPt states sometimes   In the past 7 days, how much did pain keep you from doing your normal work? None 0   Do you spend quality time with family or friends (virtually or in person)? Yes yes   Do you usually eat a heart healthy diet that constists of a variety of fruits, vegetables, whole grains and fiber? Yesyes    Do you eat foods high in fat and/or Fast Food more than three times per week? No no   How concerned are you that your medical conditions are not being well managed? Not at all no       Advance Care Planning  Do you have an Advance Directive, Living Will, Durable Power of , or POLST? YesPolst discussed filled out 11/14/23      Durable Power of    is on file      Health Maintenance Summary            Ordered - Bone Density Scan (Every 5 Years) Ordered on 8/29/2023 06/17/2016  DS-BONE DENSITY STUDY (DEXA)    10/16/2013  DS-BONE DENSITY STUDY (DEXA)    03/25/2010  DS-BONE DENSITY STUDY (DEXA)    07/10/2007  DS-BONE DENSITY STUDY (DEXA)    07/01/2007  DS-BONE DENSITY STUDY (DEXA)              Overdue - Annual Wellness Visit (Every 366 Days) Overdue since 6/22/2022 06/21/2021  Level of Service: [place] ANNUAL WELLNESS VISIT-INCLUDES PPPS SUBSEQUE*    06/21/2021  Visit Dx: Medicare annual wellness visit, subsequent    06/12/2020  Subsequent Annual Wellness Visit - Includes PPPS ()    06/12/2020  Visit Dx: Medicare annual wellness visit, subsequent    04/15/2019  Subsequent Annual Wellness Visit - Includes PPPS ()    Only the first 5 history entries have been loaded, but more history exists.              Postponed - Zoster (Shingles) Vaccines (1 of 2) Postponed until 1/29/2024      No completion history  exists for this topic.              COVID-19 Vaccine (2 - Pfizer series) Next due on 2/23/2024      10/23/2023  Outside Immunization: COVID-19(MOD) 12yrs \T\ up    10/10/2022  Imm Admin: MODERNA BIVALENT BOOSTER SARS-COV-2 VACCINE (6+)    04/05/2022  Imm Admin: MODERNA SARS-COV-2 VACCINE (12+)    11/29/2021  Imm Admin: MODERNA SARS-COV-2 VACCINE (12+)    03/05/2021  Imm Admin: MODERNA SARS-COV-2 VACCINE (12+)    Only the first 5 history entries have been loaded, but more history exists.              IMM DTaP/Tdap/Td Vaccine (2 - Td or Tdap) Next due on 12/21/2030 12/21/2020  Imm Admin: Tdap Vaccine    07/28/2009  Imm Admin: TD Vaccine              Pneumococcal Vaccine: 65+ Years (Series Information) Completed      08/20/2018  Imm Admin: Pneumococcal Conjugate Vaccine (Prevnar/PCV-13)    11/08/2013  Imm Admin: Pneumococcal polysaccharide vaccine (PPSV-23)              Influenza Vaccine (Series Information) Completed      09/19/2023  Outside Immunization: Fluzone High-Dose Quad    09/16/2022  Imm Admin: Influenza Vaccine Adult HD    09/30/2021  Imm Admin: Influenza Vaccine Adult HD    10/02/2020  Imm Admin: Influenza Vaccine Adult HD    09/25/2019  Imm Admin: Influenza Vaccine Adult HD    Only the first 5 history entries have been loaded, but more history exists.              Hepatitis A Vaccine (Hep A) (Series Information) Aged Out      No completion history exists for this topic.              Hepatitis B Vaccine (Hep B) (Series Information) Aged Out      No completion history exists for this topic.              HPV Vaccines (Series Information) Aged Out      No completion history exists for this topic.              Polio Vaccine (Inactivated Polio) (Series Information) Aged Out      No completion history exists for this topic.              Meningococcal Immunization (Series Information) Aged Out      No completion history exists for this topic.              Discontinued - Mammogram  Discontinued         Frequency changed to Never automatically (Topic No Longer Applies)    11/25/2022  MA-SCREENING MAMMO BILAT W/TOMOSYNTHESIS W/CAD    11/22/2021  MA-SCREENING MAMMO BILAT W/TOMOSYNTHESIS W/CAD    11/03/2020  [id number]    11/04/2019  [id number]    Only the first 5 history entries have been loaded, but more history exists.              Discontinued - Colorectal Cancer Screening  Discontinued        Frequency changed to Never automatically (Topic No Longer Applies)    12/07/2012  Colonoscopy (Patient Declined)                    Patient Care Team:  TREVA Mcmillan as PCP - General (Nurse Practitioner Family)  Ashish Serrano M.D. (Orthopedic Surgery)      Social History     Tobacco Use    Smoking status: Never    Smokeless tobacco: Never   Vaping Use    Vaping Use: Never used   Substance Use Topics    Alcohol use: No     Alcohol/week: 0.0 oz    Drug use: No     Family History   Problem Relation Age of Onset    Hypertension Mother     Other Father     Other Sister         alzheimer    Cancer Neg Hx     Diabetes Neg Hx     Heart Disease Neg Hx     Hyperlipidemia Neg Hx     Stroke Neg Hx      She  has a past medical history of Arthritis (2015), Bilateral cataracts, Cancer (HCC) (1990), Closed fracture of distal end of right radius (10/24/2022), DJD (degenerative joint disease), Encounter to establish care (08/29/2023), High cholesterol, HTN, breast cancer, Hyperlipidemia, Osteoporosis, Pain (07/2015), Pigmented skin lesion (04/30/2015), Postherpetic neuralgia, and Tubular adenoma.   Past Surgical History:   Procedure Laterality Date    KNEE MANIPULATION Right 10/12/2015    Procedure: KNEE MANIPULATION;  Surgeon: Ashish Serrano M.D.;  Location: SURGERY [facility];  Service:     KNEE ARTHROPLASTY TOTAL Right 7/13/2015    Procedure: KNEE ARTHROPLASTY TOTAL;  Surgeon: Ashish Serrano M.D.;  Location: SURGERY [facility];  Service:     CATARACT EXTRACTION WITH IOL   2000    Bilateral    ROTATOR CUFF REPAIR  1990    Left shoulder    MASTECTOMY  1990    Left    CYST EXCISION Left 1989    ganglion cyst removal, wrist    HYSTERECTOMY, TOTAL ABDOMINAL  1970       Exam:   /64 (BP Location: Right arm, Patient Position: Sitting, BP Cuff Size: Adult)   Pulse 78   Temp (!) 35.8 °C (96.4 °F) (Temporal)   Resp 20   Ht 1.524 m (5')   Wt 41.3 kg (91 lb)   SpO2 96%  Body mass index is 17.77 kg/m².    Hearing excellent.    Dentition good  Alert, oriented in no acute distress.  Eye contact is good, speech goal directed, affect calm    Assessment and Plan. The following treatment and monitoring plan is recommended:   Problem List Items Addressed This Visit       Dyslipidemia     Chronic, stable. Continue fenofibrate 48 mg daily.         HTN (hypertension), benign     Chronic, stable. Continue felodipine er 10 mg daily         Osteoporosis     Chronic, stable. Continue vit d3 daily         Macular degeneration     Chronic, stable. Continue preservision vitamin daily         Plantar wart, right foot     Chronic, noticed by . Wart to plantar aspect of foot. Not bothersome or painful. Discussed treatment options; declines today             Services suggested: No services needed at this time  Health Care Screening: Age-appropriate preventive services recommended by USPTF and ACIP covered by Medicare were discussed today. Services ordered if indicated and agreed upon by the patient.  Referrals offered: Community-based lifestyle interventions to reduce health risks and promote self-management and wellness, fall prevention, nutrition, physical activity, tobacco-use cessation, weight loss, and mental health services as per orders if indicated.    Discussion today about general wellness and lifestyle habits:    Prevent falls and reduce trip hazards; Cautioned about securing or removing rugs.  Have a working fire alarm and carbon monoxide detector;   Engage in regular physical  activity and social activities     Follow-up: Return in about 6 months (around 5/14/2024), or if symptoms worsen or fail to improve, for wellness.

## 2023-11-14 NOTE — ASSESSMENT & PLAN NOTE
Chronic, noticed by . Wart to plantar aspect of foot. Not bothersome or painful. Discussed treatment options; declines today

## 2023-11-15 ENCOUNTER — TELEPHONE (OUTPATIENT)
Dept: MEDICAL GROUP | Facility: PHYSICIAN GROUP | Age: 88
End: 2023-11-15
Payer: MEDICARE

## 2023-11-15 NOTE — TELEPHONE ENCOUNTER
Got a call from [facility] stating that they need plans of care from Occ Therapy from 10/10/23 and Physical Therapy 10/19/23 and faxed to [phone]. I was looking at the patients chart and I dont see anything for these dates. More then happy to get paper work squared away so we can send it out.

## 2024-02-22 ENCOUNTER — APPOINTMENT (OUTPATIENT)
Dept: RADIOLOGY | Facility: MEDICAL CENTER | Age: 89
DRG: 291 | End: 2024-02-22
Attending: EMERGENCY MEDICINE
Payer: MEDICARE

## 2024-02-22 ENCOUNTER — HOSPITAL ENCOUNTER (INPATIENT)
Facility: MEDICAL CENTER | Age: 89
LOS: 4 days | DRG: 291 | End: 2024-02-26
Attending: EMERGENCY MEDICINE | Admitting: STUDENT IN AN ORGANIZED HEALTH CARE EDUCATION/TRAINING PROGRAM
Payer: MEDICARE

## 2024-02-22 DIAGNOSIS — R05.1 ACUTE COUGH: ICD-10-CM

## 2024-02-22 DIAGNOSIS — R21 RASH: ICD-10-CM

## 2024-02-22 DIAGNOSIS — J90 BILATERAL PLEURAL EFFUSION: ICD-10-CM

## 2024-02-22 DIAGNOSIS — R91.8 BILATERAL PULMONARY INFILTRATES: ICD-10-CM

## 2024-02-22 DIAGNOSIS — M79.89 LEG SWELLING: ICD-10-CM

## 2024-02-22 DIAGNOSIS — I50.20 SYSTOLIC CONGESTIVE HEART FAILURE, UNSPECIFIED HF CHRONICITY (HCC): ICD-10-CM

## 2024-02-22 DIAGNOSIS — R54 AGE-RELATED PHYSICAL DEBILITY: ICD-10-CM

## 2024-02-22 DIAGNOSIS — R09.02 HYPOXIA: ICD-10-CM

## 2024-02-22 PROBLEM — E87.70 VOLUME OVERLOAD: Status: ACTIVE | Noted: 2024-02-22

## 2024-02-22 LAB
ALBUMIN SERPL BCP-MCNC: 3.3 G/DL (ref 3.2–4.9)
ALBUMIN/GLOB SERPL: 1.3 G/DL
ALP SERPL-CCNC: 121 U/L (ref 30–99)
ALT SERPL-CCNC: 52 U/L (ref 2–50)
ANION GAP SERPL CALC-SCNC: 12 MMOL/L (ref 7–16)
AST SERPL-CCNC: 40 U/L (ref 12–45)
BASOPHILS # BLD AUTO: 0.2 % (ref 0–1.8)
BASOPHILS # BLD: 0.02 K/UL (ref 0–0.12)
BILIRUB SERPL-MCNC: 1 MG/DL (ref 0.1–1.5)
BUN SERPL-MCNC: 26 MG/DL (ref 8–22)
CALCIUM ALBUM COR SERPL-MCNC: 9.2 MG/DL (ref 8.5–10.5)
CALCIUM SERPL-MCNC: 8.6 MG/DL (ref 8.5–10.5)
CHLORIDE SERPL-SCNC: 106 MMOL/L (ref 96–112)
CO2 SERPL-SCNC: 24 MMOL/L (ref 20–33)
CREAT SERPL-MCNC: 0.56 MG/DL (ref 0.5–1.4)
D DIMER PPP IA.FEU-MCNC: 1.51 UG/ML (FEU) (ref 0–0.5)
EKG IMPRESSION: NORMAL
EOSINOPHIL # BLD AUTO: 0.77 K/UL (ref 0–0.51)
EOSINOPHIL NFR BLD: 9.4 % (ref 0–6.9)
ERYTHROCYTE [DISTWIDTH] IN BLOOD BY AUTOMATED COUNT: 45.1 FL (ref 35.9–50)
FLUAV RNA SPEC QL NAA+PROBE: NEGATIVE
FLUBV RNA SPEC QL NAA+PROBE: NEGATIVE
GFR SERPLBLD CREATININE-BSD FMLA CKD-EPI: 83 ML/MIN/1.73 M 2
GLOBULIN SER CALC-MCNC: 2.6 G/DL (ref 1.9–3.5)
GLUCOSE SERPL-MCNC: 112 MG/DL (ref 65–99)
HCT VFR BLD AUTO: 41 % (ref 37–47)
HGB BLD-MCNC: 13.8 G/DL (ref 12–16)
IMM GRANULOCYTES # BLD AUTO: 0.04 K/UL (ref 0–0.11)
IMM GRANULOCYTES NFR BLD AUTO: 0.5 % (ref 0–0.9)
LACTATE SERPL-SCNC: 1.3 MMOL/L (ref 0.5–2)
LYMPHOCYTES # BLD AUTO: 1.54 K/UL (ref 1–4.8)
LYMPHOCYTES NFR BLD: 18.7 % (ref 22–41)
MCH RBC QN AUTO: 30.3 PG (ref 27–33)
MCHC RBC AUTO-ENTMCNC: 33.7 G/DL (ref 32.2–35.5)
MCV RBC AUTO: 89.9 FL (ref 81.4–97.8)
MONOCYTES # BLD AUTO: 0.73 K/UL (ref 0–0.85)
MONOCYTES NFR BLD AUTO: 8.9 % (ref 0–13.4)
NEUTROPHILS # BLD AUTO: 5.12 K/UL (ref 1.82–7.42)
NEUTROPHILS NFR BLD: 62.3 % (ref 44–72)
NRBC # BLD AUTO: 0 K/UL
NRBC BLD-RTO: 0 /100 WBC (ref 0–0.2)
NT-PROBNP SERPL IA-MCNC: 1856 PG/ML (ref 0–125)
PLATELET # BLD AUTO: 358 K/UL (ref 164–446)
PMV BLD AUTO: 9.4 FL (ref 9–12.9)
POTASSIUM SERPL-SCNC: 3.7 MMOL/L (ref 3.6–5.5)
PROCALCITONIN SERPL-MCNC: <0.05 NG/ML
PROT SERPL-MCNC: 5.9 G/DL (ref 6–8.2)
RBC # BLD AUTO: 4.56 M/UL (ref 4.2–5.4)
RSV RNA SPEC QL NAA+PROBE: NEGATIVE
SARS-COV-2 RNA RESP QL NAA+PROBE: NOTDETECTED
SODIUM SERPL-SCNC: 142 MMOL/L (ref 135–145)
TROPONIN T SERPL-MCNC: 24 NG/L (ref 6–19)
TROPONIN T SERPL-MCNC: 24 NG/L (ref 6–19)
TROPONIN T SERPL-MCNC: 25 NG/L (ref 6–19)
WBC # BLD AUTO: 8.2 K/UL (ref 4.8–10.8)

## 2024-02-22 PROCEDURE — 71045 X-RAY EXAM CHEST 1 VIEW: CPT

## 2024-02-22 PROCEDURE — 83880 ASSAY OF NATRIURETIC PEPTIDE: CPT

## 2024-02-22 PROCEDURE — 96365 THER/PROPH/DIAG IV INF INIT: CPT

## 2024-02-22 PROCEDURE — 96375 TX/PRO/DX INJ NEW DRUG ADDON: CPT

## 2024-02-22 PROCEDURE — 0241U HCHG SARS-COV-2 COVID-19 NFCT DS RESP RNA 4 TRGT ED POC: CPT

## 2024-02-22 PROCEDURE — 700102 HCHG RX REV CODE 250 W/ 637 OVERRIDE(OP): Performed by: EMERGENCY MEDICINE

## 2024-02-22 PROCEDURE — 99497 ADVNCD CARE PLAN 30 MIN: CPT | Performed by: STUDENT IN AN ORGANIZED HEALTH CARE EDUCATION/TRAINING PROGRAM

## 2024-02-22 PROCEDURE — 80053 COMPREHEN METABOLIC PANEL: CPT

## 2024-02-22 PROCEDURE — 700117 HCHG RX CONTRAST REV CODE 255: Performed by: EMERGENCY MEDICINE

## 2024-02-22 PROCEDURE — 71275 CT ANGIOGRAPHY CHEST: CPT

## 2024-02-22 PROCEDURE — 93005 ELECTROCARDIOGRAM TRACING: CPT | Performed by: EMERGENCY MEDICINE

## 2024-02-22 PROCEDURE — 36415 COLL VENOUS BLD VENIPUNCTURE: CPT

## 2024-02-22 PROCEDURE — 85379 FIBRIN DEGRADATION QUANT: CPT

## 2024-02-22 PROCEDURE — A9270 NON-COVERED ITEM OR SERVICE: HCPCS | Performed by: EMERGENCY MEDICINE

## 2024-02-22 PROCEDURE — 93005 ELECTROCARDIOGRAM TRACING: CPT

## 2024-02-22 PROCEDURE — 84484 ASSAY OF TROPONIN QUANT: CPT

## 2024-02-22 PROCEDURE — 83605 ASSAY OF LACTIC ACID: CPT

## 2024-02-22 PROCEDURE — 700111 HCHG RX REV CODE 636 W/ 250 OVERRIDE (IP): Mod: JZ | Performed by: EMERGENCY MEDICINE

## 2024-02-22 PROCEDURE — 99285 EMERGENCY DEPT VISIT HI MDM: CPT

## 2024-02-22 PROCEDURE — 770020 HCHG ROOM/CARE - TELE (206)

## 2024-02-22 PROCEDURE — 85025 COMPLETE CBC W/AUTO DIFF WBC: CPT

## 2024-02-22 PROCEDURE — 93970 EXTREMITY STUDY: CPT

## 2024-02-22 PROCEDURE — 87040 BLOOD CULTURE FOR BACTERIA: CPT

## 2024-02-22 PROCEDURE — 99223 1ST HOSP IP/OBS HIGH 75: CPT | Mod: 25,AI | Performed by: STUDENT IN AN ORGANIZED HEALTH CARE EDUCATION/TRAINING PROGRAM

## 2024-02-22 PROCEDURE — 84145 PROCALCITONIN (PCT): CPT

## 2024-02-22 RX ORDER — ONDANSETRON 2 MG/ML
4 INJECTION INTRAMUSCULAR; INTRAVENOUS EVERY 4 HOURS PRN
Status: DISCONTINUED | OUTPATIENT
Start: 2024-02-22 | End: 2024-02-26 | Stop reason: HOSPADM

## 2024-02-22 RX ORDER — ACETAMINOPHEN 325 MG/1
650 TABLET ORAL EVERY 6 HOURS PRN
Status: DISCONTINUED | OUTPATIENT
Start: 2024-02-22 | End: 2024-02-26 | Stop reason: HOSPADM

## 2024-02-22 RX ORDER — FUROSEMIDE 10 MG/ML
20 INJECTION INTRAMUSCULAR; INTRAVENOUS
Status: DISPENSED | OUTPATIENT
Start: 2024-02-22 | End: 2024-02-25

## 2024-02-22 RX ORDER — LORATADINE 10 MG/1
10 TABLET ORAL ONCE
Status: COMPLETED | OUTPATIENT
Start: 2024-02-22 | End: 2024-02-22

## 2024-02-22 RX ORDER — HYDRALAZINE HYDROCHLORIDE 20 MG/ML
10 INJECTION INTRAMUSCULAR; INTRAVENOUS EVERY 4 HOURS PRN
Status: DISCONTINUED | OUTPATIENT
Start: 2024-02-22 | End: 2024-02-26 | Stop reason: HOSPADM

## 2024-02-22 RX ORDER — FENOFIBRATE 48 MG/1
48 TABLET, COATED ORAL DAILY
Status: DISCONTINUED | OUTPATIENT
Start: 2024-02-23 | End: 2024-02-22

## 2024-02-22 RX ORDER — CEFTRIAXONE 2 G/1
2000 INJECTION, POWDER, FOR SOLUTION INTRAMUSCULAR; INTRAVENOUS ONCE
Status: COMPLETED | OUTPATIENT
Start: 2024-02-22 | End: 2024-02-22

## 2024-02-22 RX ORDER — ONDANSETRON 4 MG/1
4 TABLET, ORALLY DISINTEGRATING ORAL EVERY 4 HOURS PRN
Status: DISCONTINUED | OUTPATIENT
Start: 2024-02-22 | End: 2024-02-26 | Stop reason: HOSPADM

## 2024-02-22 RX ORDER — AZITHROMYCIN 500 MG/1
500 INJECTION, POWDER, LYOPHILIZED, FOR SOLUTION INTRAVENOUS ONCE
Status: COMPLETED | OUTPATIENT
Start: 2024-02-22 | End: 2024-02-22

## 2024-02-22 RX ORDER — AZITHROMYCIN 500 MG/5ML
500 INJECTION, POWDER, LYOPHILIZED, FOR SOLUTION INTRAVENOUS ONCE
Status: DISCONTINUED | OUTPATIENT
Start: 2024-02-22 | End: 2024-02-22

## 2024-02-22 RX ADMIN — LORATADINE 10 MG: 10 TABLET ORAL at 19:23

## 2024-02-22 RX ADMIN — IOHEXOL 70 ML: 350 INJECTION, SOLUTION INTRAVENOUS at 21:30

## 2024-02-22 RX ADMIN — AZITHROMYCIN 500 MG: 500 INJECTION, POWDER, LYOPHILIZED, FOR SOLUTION INTRAVENOUS at 20:18

## 2024-02-22 RX ADMIN — CEFTRIAXONE SODIUM 2000 MG: 2 INJECTION, POWDER, FOR SOLUTION INTRAMUSCULAR; INTRAVENOUS at 20:17

## 2024-02-22 ASSESSMENT — COGNITIVE AND FUNCTIONAL STATUS - GENERAL
STANDING UP FROM CHAIR USING ARMS: A LITTLE
CLIMB 3 TO 5 STEPS WITH RAILING: A LITTLE
WALKING IN HOSPITAL ROOM: A LITTLE
SUGGESTED CMS G CODE MODIFIER MOBILITY: CJ
DAILY ACTIVITIY SCORE: 23
SUGGESTED CMS G CODE MODIFIER DAILY ACTIVITY: CI
MOBILITY SCORE: 21
TOILETING: A LITTLE

## 2024-02-22 ASSESSMENT — LIFESTYLE VARIABLES
HOW MANY TIMES IN THE PAST YEAR HAVE YOU HAD 5 OR MORE DRINKS IN A DAY: 0
HAVE YOU EVER FELT YOU SHOULD CUT DOWN ON YOUR DRINKING: NO
EVER HAD A DRINK FIRST THING IN THE MORNING TO STEADY YOUR NERVES TO GET RID OF A HANGOVER: NO
TOTAL SCORE: 0
ON A TYPICAL DAY WHEN YOU DRINK ALCOHOL HOW MANY DRINKS DO YOU HAVE: 0
EVER FELT BAD OR GUILTY ABOUT YOUR DRINKING: NO
CONSUMPTION TOTAL: NEGATIVE
TOTAL SCORE: 0
AVERAGE NUMBER OF DAYS PER WEEK YOU HAVE A DRINK CONTAINING ALCOHOL: 0
TOTAL SCORE: 0
HAVE PEOPLE ANNOYED YOU BY CRITICIZING YOUR DRINKING: NO
DOES PATIENT WANT TO STOP DRINKING: NO
ALCOHOL_USE: NO

## 2024-02-22 ASSESSMENT — PATIENT HEALTH QUESTIONNAIRE - PHQ9
SUM OF ALL RESPONSES TO PHQ9 QUESTIONS 1 AND 2: 0
1. LITTLE INTEREST OR PLEASURE IN DOING THINGS: NOT AT ALL
2. FEELING DOWN, DEPRESSED, IRRITABLE, OR HOPELESS: NOT AT ALL

## 2024-02-22 ASSESSMENT — FIBROSIS 4 INDEX: FIB4 SCORE: 1.95

## 2024-02-22 NOTE — ED TRIAGE NOTES
Pt to triage via w/c with family friend c/o swelling to bilateral legs red itchy rash to back and cough. Pt denies fever. Pt swabbed for covid and back for EKG. Nad.

## 2024-02-23 ENCOUNTER — APPOINTMENT (OUTPATIENT)
Dept: RADIOLOGY | Facility: MEDICAL CENTER | Age: 89
DRG: 291 | End: 2024-02-23
Attending: STUDENT IN AN ORGANIZED HEALTH CARE EDUCATION/TRAINING PROGRAM
Payer: MEDICARE

## 2024-02-23 PROBLEM — R60.0 BILATERAL LEG EDEMA: Status: ACTIVE | Noted: 2024-02-23

## 2024-02-23 PROBLEM — Z71.89 ACP (ADVANCE CARE PLANNING): Status: ACTIVE | Noted: 2023-08-29

## 2024-02-23 PROBLEM — J96.01 ACUTE RESPIRATORY FAILURE WITH HYPOXIA (HCC): Status: ACTIVE | Noted: 2024-02-23

## 2024-02-23 PROBLEM — R54 AGE-RELATED PHYSICAL DEBILITY: Status: ACTIVE | Noted: 2024-02-23

## 2024-02-23 PROBLEM — I50.9 CHF (CONGESTIVE HEART FAILURE) (HCC): Status: ACTIVE | Noted: 2024-02-23

## 2024-02-23 PROBLEM — R79.89 ELEVATED TROPONIN: Status: ACTIVE | Noted: 2024-02-23

## 2024-02-23 LAB
ALBUMIN SERPL BCP-MCNC: 3.2 G/DL (ref 3.2–4.9)
ALBUMIN/GLOB SERPL: 1.2 G/DL
ALP SERPL-CCNC: 123 U/L (ref 30–99)
ALT SERPL-CCNC: 54 U/L (ref 2–50)
ANION GAP SERPL CALC-SCNC: 13 MMOL/L (ref 7–16)
APPEARANCE UR: CLEAR
AST SERPL-CCNC: 43 U/L (ref 12–45)
BASOPHILS # BLD AUTO: 0.4 % (ref 0–1.8)
BASOPHILS # BLD: 0.03 K/UL (ref 0–0.12)
BILIRUB SERPL-MCNC: 0.8 MG/DL (ref 0.1–1.5)
BILIRUB UR QL STRIP.AUTO: NEGATIVE
BUN SERPL-MCNC: 19 MG/DL (ref 8–22)
CALCIUM ALBUM COR SERPL-MCNC: 8.8 MG/DL (ref 8.5–10.5)
CALCIUM SERPL-MCNC: 8.2 MG/DL (ref 8.5–10.5)
CHLORIDE SERPL-SCNC: 105 MMOL/L (ref 96–112)
CO2 SERPL-SCNC: 24 MMOL/L (ref 20–33)
COLOR UR: YELLOW
CORTIS SERPL-MCNC: 17.1 UG/DL (ref 0–23)
CREAT SERPL-MCNC: 0.47 MG/DL (ref 0.5–1.4)
EKG IMPRESSION: NORMAL
EKG IMPRESSION: NORMAL
EOSINOPHIL # BLD AUTO: 0.86 K/UL (ref 0–0.51)
EOSINOPHIL NFR BLD: 11.7 % (ref 0–6.9)
ERYTHROCYTE [DISTWIDTH] IN BLOOD BY AUTOMATED COUNT: 45.2 FL (ref 35.9–50)
GFR SERPLBLD CREATININE-BSD FMLA CKD-EPI: 87 ML/MIN/1.73 M 2
GLOBULIN SER CALC-MCNC: 2.6 G/DL (ref 1.9–3.5)
GLUCOSE SERPL-MCNC: 98 MG/DL (ref 65–99)
GLUCOSE UR STRIP.AUTO-MCNC: NEGATIVE MG/DL
HCT VFR BLD AUTO: 41.3 % (ref 37–47)
HGB BLD-MCNC: 14.1 G/DL (ref 12–16)
IMM GRANULOCYTES # BLD AUTO: 0.02 K/UL (ref 0–0.11)
IMM GRANULOCYTES NFR BLD AUTO: 0.3 % (ref 0–0.9)
INR PPP: 1.03 (ref 0.87–1.13)
KETONES UR STRIP.AUTO-MCNC: NEGATIVE MG/DL
LDH SERPL L TO P-CCNC: 339 U/L (ref 107–266)
LEUKOCYTE ESTERASE UR QL STRIP.AUTO: NEGATIVE
LYMPHOCYTES # BLD AUTO: 1.41 K/UL (ref 1–4.8)
LYMPHOCYTES NFR BLD: 19.3 % (ref 22–41)
MAGNESIUM SERPL-MCNC: 2.3 MG/DL (ref 1.5–2.5)
MAGNESIUM SERPL-MCNC: 2.3 MG/DL (ref 1.5–2.5)
MCH RBC QN AUTO: 30.7 PG (ref 27–33)
MCHC RBC AUTO-ENTMCNC: 34.1 G/DL (ref 32.2–35.5)
MCV RBC AUTO: 90 FL (ref 81.4–97.8)
MICRO URNS: NORMAL
MONOCYTES # BLD AUTO: 0.77 K/UL (ref 0–0.85)
MONOCYTES NFR BLD AUTO: 10.5 % (ref 0–13.4)
NEUTROPHILS # BLD AUTO: 4.23 K/UL (ref 1.82–7.42)
NEUTROPHILS NFR BLD: 57.8 % (ref 44–72)
NITRITE UR QL STRIP.AUTO: NEGATIVE
NRBC # BLD AUTO: 0 K/UL
NRBC BLD-RTO: 0 /100 WBC (ref 0–0.2)
PH UR STRIP.AUTO: 5.5 [PH] (ref 5–8)
PHOSPHATE SERPL-MCNC: 3.7 MG/DL (ref 2.5–4.5)
PLATELET # BLD AUTO: 320 K/UL (ref 164–446)
PMV BLD AUTO: 9.8 FL (ref 9–12.9)
POTASSIUM SERPL-SCNC: 3.9 MMOL/L (ref 3.6–5.5)
PROT SERPL-MCNC: 5.8 G/DL (ref 6–8.2)
PROT UR QL STRIP: NEGATIVE MG/DL
PROTHROMBIN TIME: 13.6 SEC (ref 12–14.6)
RBC # BLD AUTO: 4.59 M/UL (ref 4.2–5.4)
RBC UR QL AUTO: NEGATIVE
SODIUM SERPL-SCNC: 142 MMOL/L (ref 135–145)
SP GR UR STRIP.AUTO: 1.01
UROBILINOGEN UR STRIP.AUTO-MCNC: 0.2 MG/DL
WBC # BLD AUTO: 7.3 K/UL (ref 4.8–10.8)

## 2024-02-23 PROCEDURE — 93005 ELECTROCARDIOGRAM TRACING: CPT | Performed by: INTERNAL MEDICINE

## 2024-02-23 PROCEDURE — 700111 HCHG RX REV CODE 636 W/ 250 OVERRIDE (IP): Performed by: INTERNAL MEDICINE

## 2024-02-23 PROCEDURE — 83615 LACTATE (LD) (LDH) ENZYME: CPT

## 2024-02-23 PROCEDURE — 36415 COLL VENOUS BLD VENIPUNCTURE: CPT

## 2024-02-23 PROCEDURE — 770020 HCHG ROOM/CARE - TELE (206)

## 2024-02-23 PROCEDURE — 700102 HCHG RX REV CODE 250 W/ 637 OVERRIDE(OP): Performed by: STUDENT IN AN ORGANIZED HEALTH CARE EDUCATION/TRAINING PROGRAM

## 2024-02-23 PROCEDURE — 93010 ELECTROCARDIOGRAM REPORT: CPT | Performed by: INTERNAL MEDICINE

## 2024-02-23 PROCEDURE — 97166 OT EVAL MOD COMPLEX 45 MIN: CPT

## 2024-02-23 PROCEDURE — 93005 ELECTROCARDIOGRAM TRACING: CPT | Performed by: STUDENT IN AN ORGANIZED HEALTH CARE EDUCATION/TRAINING PROGRAM

## 2024-02-23 PROCEDURE — 85610 PROTHROMBIN TIME: CPT

## 2024-02-23 PROCEDURE — 87086 URINE CULTURE/COLONY COUNT: CPT

## 2024-02-23 PROCEDURE — 85025 COMPLETE CBC W/AUTO DIFF WBC: CPT

## 2024-02-23 PROCEDURE — A9270 NON-COVERED ITEM OR SERVICE: HCPCS | Performed by: STUDENT IN AN ORGANIZED HEALTH CARE EDUCATION/TRAINING PROGRAM

## 2024-02-23 PROCEDURE — 80053 COMPREHEN METABOLIC PANEL: CPT

## 2024-02-23 PROCEDURE — 84100 ASSAY OF PHOSPHORUS: CPT

## 2024-02-23 PROCEDURE — 81003 URINALYSIS AUTO W/O SCOPE: CPT

## 2024-02-23 PROCEDURE — 82533 TOTAL CORTISOL: CPT

## 2024-02-23 PROCEDURE — 83735 ASSAY OF MAGNESIUM: CPT | Mod: 91

## 2024-02-23 PROCEDURE — 99233 SBSQ HOSP IP/OBS HIGH 50: CPT | Performed by: INTERNAL MEDICINE

## 2024-02-23 PROCEDURE — 700111 HCHG RX REV CODE 636 W/ 250 OVERRIDE (IP): Performed by: STUDENT IN AN ORGANIZED HEALTH CARE EDUCATION/TRAINING PROGRAM

## 2024-02-23 PROCEDURE — 97162 PT EVAL MOD COMPLEX 30 MIN: CPT

## 2024-02-23 RX ORDER — DIPHENHYDRAMINE HYDROCHLORIDE, ZINC ACETATE 2; .1 G/100G; G/100G
CREAM TOPICAL 3 TIMES DAILY PRN
Status: DISCONTINUED | OUTPATIENT
Start: 2024-02-23 | End: 2024-02-26 | Stop reason: HOSPADM

## 2024-02-23 RX ORDER — FELODIPINE 5 MG/1
10 TABLET, EXTENDED RELEASE ORAL DAILY
Status: DISCONTINUED | OUTPATIENT
Start: 2024-02-23 | End: 2024-02-25

## 2024-02-23 RX ORDER — IPRATROPIUM BROMIDE AND ALBUTEROL SULFATE 2.5; .5 MG/3ML; MG/3ML
3 SOLUTION RESPIRATORY (INHALATION)
Status: DISCONTINUED | OUTPATIENT
Start: 2024-02-23 | End: 2024-02-23

## 2024-02-23 RX ORDER — BENZONATATE 100 MG/1
100 CAPSULE ORAL 3 TIMES DAILY PRN
Status: DISCONTINUED | OUTPATIENT
Start: 2024-02-23 | End: 2024-02-26 | Stop reason: HOSPADM

## 2024-02-23 RX ORDER — DOXYCYCLINE 100 MG/1
100 TABLET ORAL EVERY 12 HOURS
Status: DISCONTINUED | OUTPATIENT
Start: 2024-02-23 | End: 2024-02-24

## 2024-02-23 RX ORDER — MAGNESIUM SULFATE 1 G/100ML
1 INJECTION INTRAVENOUS ONCE
Status: COMPLETED | OUTPATIENT
Start: 2024-02-23 | End: 2024-02-23

## 2024-02-23 RX ORDER — ENOXAPARIN SODIUM 100 MG/ML
40 INJECTION SUBCUTANEOUS DAILY
Status: DISCONTINUED | OUTPATIENT
Start: 2024-02-24 | End: 2024-02-26 | Stop reason: HOSPADM

## 2024-02-23 RX ORDER — ASPIRIN 81 MG/1
81 TABLET ORAL DAILY
Status: DISCONTINUED | OUTPATIENT
Start: 2024-02-23 | End: 2024-02-26 | Stop reason: HOSPADM

## 2024-02-23 RX ORDER — AZITHROMYCIN 250 MG/1
500 TABLET, FILM COATED ORAL DAILY
Status: DISCONTINUED | OUTPATIENT
Start: 2024-02-23 | End: 2024-02-23

## 2024-02-23 RX ORDER — POTASSIUM CHLORIDE 20 MEQ/1
40 TABLET, EXTENDED RELEASE ORAL ONCE
Status: COMPLETED | OUTPATIENT
Start: 2024-02-23 | End: 2024-02-23

## 2024-02-23 RX ORDER — ATORVASTATIN CALCIUM 20 MG/1
20 TABLET, FILM COATED ORAL EVERY EVENING
Status: DISCONTINUED | OUTPATIENT
Start: 2024-02-23 | End: 2024-02-26 | Stop reason: HOSPADM

## 2024-02-23 RX ORDER — DIPHENHYDRAMINE HCL 25 MG
25 TABLET ORAL EVERY 6 HOURS PRN
Status: DISCONTINUED | OUTPATIENT
Start: 2024-02-23 | End: 2024-02-23

## 2024-02-23 RX ADMIN — FUROSEMIDE 20 MG: 10 INJECTION INTRAMUSCULAR; INTRAVENOUS at 00:35

## 2024-02-23 RX ADMIN — DOXYCYCLINE 100 MG: 100 TABLET, FILM COATED ORAL at 17:36

## 2024-02-23 RX ADMIN — DOXYCYCLINE 100 MG: 100 TABLET, FILM COATED ORAL at 06:06

## 2024-02-23 RX ADMIN — Medication: at 14:37

## 2024-02-23 RX ADMIN — Medication 1 EACH: at 06:47

## 2024-02-23 RX ADMIN — MAGNESIUM SULFATE IN DEXTROSE 1 G: 10 INJECTION, SOLUTION INTRAVENOUS at 00:44

## 2024-02-23 RX ADMIN — ATORVASTATIN CALCIUM 20 MG: 20 TABLET, FILM COATED ORAL at 17:36

## 2024-02-23 RX ADMIN — POTASSIUM CHLORIDE 40 MEQ: 1500 TABLET, EXTENDED RELEASE ORAL at 00:36

## 2024-02-23 RX ADMIN — ASPIRIN 81 MG: 81 TABLET, COATED ORAL at 06:06

## 2024-02-23 RX ADMIN — FUROSEMIDE 20 MG: 10 INJECTION INTRAMUSCULAR; INTRAVENOUS at 16:22

## 2024-02-23 ASSESSMENT — ACTIVITIES OF DAILY LIVING (ADL): TOILETING: INDEPENDENT

## 2024-02-23 ASSESSMENT — COGNITIVE AND FUNCTIONAL STATUS - GENERAL
SUGGESTED CMS G CODE MODIFIER DAILY ACTIVITY: CK
PERSONAL GROOMING: A LITTLE
DAILY ACTIVITIY SCORE: 17
MOVING FROM LYING ON BACK TO SITTING ON SIDE OF FLAT BED: A LITTLE
TOILETING: A LITTLE
MOVING TO AND FROM BED TO CHAIR: A LITTLE
WALKING IN HOSPITAL ROOM: A LITTLE
SUGGESTED CMS G CODE MODIFIER MOBILITY: CK
CLIMB 3 TO 5 STEPS WITH RAILING: A LOT
DRESSING REGULAR UPPER BODY CLOTHING: A LITTLE
TURNING FROM BACK TO SIDE WHILE IN FLAT BAD: A LITTLE
STANDING UP FROM CHAIR USING ARMS: A LITTLE
DRESSING REGULAR LOWER BODY CLOTHING: A LOT
HELP NEEDED FOR BATHING: A LOT
MOBILITY SCORE: 17

## 2024-02-23 ASSESSMENT — ENCOUNTER SYMPTOMS
DIZZINESS: 0
HEARTBURN: 0
HEMOPTYSIS: 0
WHEEZING: 1
SHORTNESS OF BREATH: 1
BRUISES/BLEEDS EASILY: 0
DEPRESSION: 0
NAUSEA: 0
HEADACHES: 0
MYALGIAS: 0
ORTHOPNEA: 1
WEAKNESS: 1
PALPITATIONS: 0
DOUBLE VISION: 0
CHILLS: 1
VOMITING: 0
BLURRED VISION: 0
SPUTUM PRODUCTION: 0
ABDOMINAL PAIN: 0
FEVER: 1
COUGH: 1

## 2024-02-23 ASSESSMENT — FIBROSIS 4 INDEX
FIB4 SCORE: 1.49
FIB4 SCORE: 1.76

## 2024-02-23 ASSESSMENT — PAIN DESCRIPTION - PAIN TYPE: TYPE: ACUTE PAIN

## 2024-02-23 ASSESSMENT — LIFESTYLE VARIABLES: SUBSTANCE_ABUSE: 0

## 2024-02-23 NOTE — ASSESSMENT & PLAN NOTE
On 2 L-wean off as tolerated  Diuresis  Nebs, pulmonary hygiene  Tested negative for COVID/influenza/RSV  Ct chest showing pleural effusion   Had thoracentesis

## 2024-02-23 NOTE — THERAPY
Physical Therapy   Initial Evaluation     Patient Name: Lizz Saldana  Age:  89 y.o., Sex:  female  Medical Record #: [id number]  Today's Date: 2/23/2024     Precautions  Precautions: Fall Risk    Assessment  Patient is 89 y.o. female presenting to acute with LE edema and cough, admitted for CHF exacerbation. PMH of HTN, breast CA. Pt seen for PT evaluation, received asleep in bedside chair with session limited by global fatigue (vitals WFL on 2L 02). Pt able to complete sit to stand and functional transfer BTB with Min to CGA with FWW support. Pt will benefit from acute PT interventions to address impairments noted below, will progress mobility as pt tolerates. Encouraged up to chair for all meals and ambulate as tolerated with nursing staff.     Plan    Physical Therapy Initial Treatment Plan   Treatment Plan : Bed Mobility, Equipment, Gait Training, Neuro Re-Education / Balance, Self Care / Home Evaluation, Therapeutic Activities, Therapeutic Exercise  Treatment Frequency: 4 Times per Week  Duration: Until Therapy Goals Met    DC Equipment Recommendations: Unable to determine at this time  Discharge Recommendations:  Currently recommend placement; however, pt may have the potential to return to ILF with HH with continued medical stability    Objective     02/23/24 0952   Precautions   Precautions Fall Risk   Vitals   O2 (LPM) 2   O2 Delivery Device Silicone Nasal Cannula   Vitals Comments trialed RA with pt desaturating to 85% upon return BTB, thus returned to 2L NC   Pain 0 - 10 Group   Therapist Pain Assessment During Activity;Nurse Notified  (no pain reported)   Prior Living Situation   Housing / Facility Independent Living Facility   Steps Into Home 0   Steps In Home 0   Equipment Owned Front-Wheel Walker;Single Point Cane   Lives with - Patient's Self Care Capacity Spouse   Comments per spouse, she assists spouse and has a friend who assists with transportation   Prior Level of Functional Mobility   Bed  Mobility Independent   Transfer Status Independent   Ambulation Independent   Ambulation Distance limited community   Assistive Devices Used   (unable to determine device used)   Cognition    Cognition / Consciousness WDL   Comments very lethargic, sleeping in bedside chair upon PT arrival. Vitals stable, pt inquiring where her spouse is   Active ROM Lower Body    Active ROM Lower Body  WDL   Strength Lower Body   Lower Body Strength  X   Comments generalized weakness related to age   Balance Assessment   Sitting Balance (Static) Fair   Sitting Balance (Dynamic) Fair   Standing Balance (Static) Fair   Standing Balance (Dynamic) Fair -   Weight Shift Sitting Fair   Weight Shift Standing Fair   Comments w/ FWW   Bed Mobility    Supine to Sit   (in chair upon PT arrival)   Sit to Supine Minimal Assist   Scooting Minimal Assist  (supine)   Rolling Supervised  (with bedrails)   Comments HOB flat   Gait Analysis   Comments deferred ambulation due to fatigue   Functional Mobility   Sit to Stand Minimal Assist  (stabilizing FWW, from low chair height)   Bed, Chair, Wheelchair Transfer Contact Guard Assist   Transfer Method Stand Step   How much difficulty does the patient currently have...   Turning over in bed (including adjusting bedclothes, sheets and blankets)? 3   Sitting down on and standing up from a chair with arms (e.g., wheelchair, bedside commode, etc.) 3   Moving from lying on back to sitting on the side of the bed? 3   How much help from another person does the patient currently need...   Moving to and from a bed to a chair (including a wheelchair)? 3   Need to walk in a hospital room? 3   Climbing 3-5 steps with a railing? 2   6 clicks Mobility Score 17   Short Term Goals    Short Term Goal # 1 pt will perform supine <> sit without bed features and SPV in 6 visits   Short Term Goal # 2 pt will perform sit <> stand and functional transfers with LRAD and SPV to improve mobility independence in 6 visits   Short  Term Goal # 3 pt will ambulate 200 ft with LRAD and SPV to access dining room or community needs in 6 visits   Education Group   Education Provided Role of Physical Therapist   Role of Physical Therapist Patient Response Patient;Acceptance;Explanation;Reinforcement Needed   Physical Therapy Initial Treatment Plan    Treatment Plan  Bed Mobility;Equipment;Gait Training;Neuro Re-Education / Balance;Self Care / Home Evaluation;Therapeutic Activities;Therapeutic Exercise   Treatment Frequency 4 Times per Week   Duration Until Therapy Goals Met   Problem List    Problems Impaired Bed Mobility;Impaired Transfers;Impaired Ambulation;Functional Strength Deficit;Decreased Activity Tolerance;Impaired Balance   Anticipated Discharge Equipment and Recommendations   DC Equipment Recommendations Unable to determine at this time   Discharge Recommendations   (Currently recommend placement; however, pt may have the potential to return to ILF with HH with continued medical stability)

## 2024-02-23 NOTE — ED PROVIDER NOTES
"                                                        ED Provider Note    CHIEF COMPLAINT  Chief Complaint   Patient presents with    Leg Swelling    Rash    Cough        HPI    Primary care provider: TREVA Mcmillan   History obtained from: Patient and daughter  History limited by: None     Lizz Saldana is a 89 y.o. female who presents to the ED with daughter with complaint of bilateral lower extremity swelling with \"red spots\" for perhaps 2 days.  Patient also with itchy red rash on her back for probably the past few days.  She also has had cough for about 3 to 4 days.  No fever noted.  Patient is not on oxygen at baseline.  She reports occasional shortness of breath.  She denies orthopnea or increasing shortness of breath associated with exertion.  She denies any pain including chest pain or abdominal pain.  She denies nausea/vomiting/diarrhea/dysuria.  Daughter reports no known ill contacts or recent travels.    REVIEW OF SYSTEMS  Please see HPI for pertinent positives/negatives.  All other systems reviewed and are negative.     PAST MEDICAL HISTORY  Past Medical History:   Diagnosis Date    Encounter to establish care 08/29/2023    Closed fracture of distal end of right radius 10/24/2022    2021, was seen by orthopedic    Pain 07/2015    right knee    Pigmented skin lesion 04/30/2015    Arthritis 2015    left knee    Cancer (HCC) 1990    Breast     Bilateral cataracts     bilateral IOL    DJD (degenerative joint disease)     High cholesterol     HTN     HX: breast cancer     Hyperlipidemia     Osteoporosis     Postherpetic neuralgia     Tubular adenoma         SURGICAL HISTORY  Past Surgical History:   Procedure Laterality Date    KNEE MANIPULATION Right 10/12/2015    Procedure: KNEE MANIPULATION;  Surgeon: Ashish Serrano M.D.;  Location: SURGERY [facility];  Service:     KNEE ARTHROPLASTY TOTAL Right 7/13/2015    Procedure: KNEE ARTHROPLASTY TOTAL;  Surgeon: Ashish HERNDON" LETITIA Serrano;  Location: SURGERY [facility];  Service:     CATARACT EXTRACTION WITH IOL  2000    Bilateral    ROTATOR CUFF REPAIR  1990    Left shoulder    MASTECTOMY  1990    Left    CYST EXCISION Left 1989    ganglion cyst removal, wrist    HYSTERECTOMY, TOTAL ABDOMINAL  1970        SOCIAL HISTORY  Social History     Tobacco Use    Smoking status: Never    Smokeless tobacco: Never   Vaping Use    Vaping Use: Never used   Substance and Sexual Activity    Alcohol use: No     Alcohol/week: 0.0 oz    Drug use: No    Sexual activity: Never     Partners: Male        FAMILY HISTORY  Family History   Problem Relation Age of Onset    Hypertension Mother     Other Father     Other Sister         alzheimer    Cancer Neg Hx     Diabetes Neg Hx     Heart Disease Neg Hx     Hyperlipidemia Neg Hx     Stroke Neg Hx         CURRENT MEDICATIONS  Home Medications       Reviewed by Bg Henderson (Pharmacy Tech) on 02/22/24 at 2311  Med List Status: Complete     Medication Last Dose Status   felodipine ER (PLENDIL) 10 MG TABLET SR 24 HR 2/22/2024 Active                     ALLERGIES  Allergies   Allergen Reactions    Codeine Nausea    Floxin [Ofloxacin] Itching and Swelling    Lidoderm      rash    Methylprednisolone Sodium Succ Rash and Itching     Painful skin blistering LE    Toradol Unspecified     .        PHYSICAL EXAM  VITAL SIGNS: BP (!) 164/77   Pulse 94   Temp 36.4 °C (97.5 °F) (Temporal)   Resp 20   Wt 41.3 kg (91 lb)   SpO2 96%   BMI 17.77 kg/m²  @LAST[199918::@     Pulse ox interpretation: 95% I interpret this pulse ox as normal     Cardiac monitor interpretation: Sinus rhythm with heart rate in the 90s as interpreted by me.  The patient presented with cough with hypoxia and cardiac monitor was ordered to monitor for dysrhythmia.    Constitutional: Well developed, well nourished, alert in no apparent distress, nontoxic appearance    HENT: No external signs of trauma, normocephalic, oropharynx moist and  clear   Eyes: PERRL, conjunctiva without erythema, no discharge, no icterus    Neck: Soft and supple, trachea midline, no stridor, no tenderness, no LAD, good ROM    Cardiovascular: Regular rate and rhythm, no murmurs/rubs/gallops, strong distal pulses and good perfusion    Thorax & Lungs: No respiratory distress, CTAB    Abdomen: Soft, nontender, nondistended, no guarding, no rebound, normal BS    Back: No CVAT     Extremities: No cyanosis, mild bilateral lower extremity swelling with scattered red roughly circular papules on both lower legs without tenderness to palpation, no gross deformity, good ROM, intact distal pulses with brisk cap refill    Skin: Warm, dry, no pallor/cyanosis, mild diffuse erythema on back that blanches with pressure, no petechiae/purpura/blisters/vesicles/ulceration/drainage/streaking/warmth/crepitus/fluctuance     Neuro: A/O times 3, no focal deficits noted    Psychiatric: Cooperative, normal mood and affect, normal judgement, appropriate for clinical situation        DIAGNOSTIC STUDIES / PROCEDURES    EKG  12 Lead EKG obtained at 1923 and interpreted by me:   Rate: 98   Rhythm: Sinus rhythm   Ectopy: QTc 510  Intervals: Normal   QRS: LBBB    Clinical Impression: Sinus rhythm with QT prolongation, LBBB with associated repolarization abnormalities  Compared to July 2, 2015      LABS  All labs reviewed by me.     Results for orders placed or performed during the hospital encounter of 02/22/24   CBC with Differential   Result Value Ref Range    WBC 8.2 4.8 - 10.8 K/uL    RBC 4.56 4.20 - 5.40 M/uL    Hemoglobin 13.8 12.0 - 16.0 g/dL    Hematocrit 41.0 37.0 - 47.0 %    MCV 89.9 81.4 - 97.8 fL    MCH 30.3 27.0 - 33.0 pg    MCHC 33.7 32.2 - 35.5 g/dL    RDW 45.1 35.9 - 50.0 fL    Platelet Count 358 164 - 446 K/uL    MPV 9.4 9.0 - 12.9 fL    Neutrophils-Polys 62.30 44.00 - 72.00 %    Lymphocytes 18.70 (L) 22.00 - 41.00 %    Monocytes 8.90 0.00 - 13.40 %    Eosinophils 9.40 (H) 0.00 - 6.90 %     Basophils 0.20 0.00 - 1.80 %    Immature Granulocytes 0.50 0.00 - 0.90 %    Nucleated RBC 0.00 0.00 - 0.20 /100 WBC    Neutrophils (Absolute) 5.12 1.82 - 7.42 K/uL    Lymphs (Absolute) 1.54 1.00 - 4.80 K/uL    Monos (Absolute) 0.73 0.00 - 0.85 K/uL    Eos (Absolute) 0.77 (H) 0.00 - 0.51 K/uL    Baso (Absolute) 0.02 0.00 - 0.12 K/uL    Immature Granulocytes (abs) 0.04 0.00 - 0.11 K/uL    NRBC (Absolute) 0.00 K/uL   Complete Metabolic Panel (CMP)   Result Value Ref Range    Sodium 142 135 - 145 mmol/L    Potassium 3.7 3.6 - 5.5 mmol/L    Chloride 106 96 - 112 mmol/L    Co2 24 20 - 33 mmol/L    Anion Gap 12.0 7.0 - 16.0    Glucose 112 (H) 65 - 99 mg/dL    Bun 26 (H) 8 - 22 mg/dL    Creatinine 0.56 0.50 - 1.40 mg/dL    Calcium 8.6 8.5 - 10.5 mg/dL    Correct Calcium 9.2 8.5 - 10.5 mg/dL    AST(SGOT) 40 12 - 45 U/L    ALT(SGPT) 52 (H) 2 - 50 U/L    Alkaline Phosphatase 121 (H) 30 - 99 U/L    Total Bilirubin 1.0 0.1 - 1.5 mg/dL    Albumin 3.3 3.2 - 4.9 g/dL    Total Protein 5.9 (L) 6.0 - 8.2 g/dL    Globulin 2.6 1.9 - 3.5 g/dL    A-G Ratio 1.3 g/dL   Troponins NOW   Result Value Ref Range    Troponin T 24 (H) 6 - 19 ng/L   Troponins in two (2) hours   Result Value Ref Range    Troponin T 24 (H) 6 - 19 ng/L   ESTIMATED GFR   Result Value Ref Range    GFR (CKD-EPI) 83 >60 mL/min/1.73 m 2   LACTIC ACID   Result Value Ref Range    Lactic Acid 1.3 0.5 - 2.0 mmol/L   D-DIMER   Result Value Ref Range    D-Dimer 1.51 (H) 0.00 - 0.50 ug/mL (FEU)   proBrain Natriuretic Peptide, NT   Result Value Ref Range    NT-proBNP 1856 (H) 0 - 125 pg/mL   PROCALCITONIN   Result Value Ref Range    Procalcitonin <0.05 <0.25 ng/mL   TROPONIN   Result Value Ref Range    Troponin T 25 (H) 6 - 19 ng/L   EKG   Result Value Ref Range    Report       [facility]    Test Date:  2024-02-22  Pt Name:    MIREILLE CHAPA                 Department: ER  MRN:        [id number]                      Room:  Gender:     Female                        Technician: [id number]  :        [id number]                   Requested By:ER TRIAGE PROTOCOL  Order #:    [id number]                    Reading MD:    Measurements  Intervals                                Axis  Rate:       98                           P:          75  VA:         136                          QRS:        -38  QRSD:       130                          T:          103  QT:         399  QTc:        510    Interpretive Statements  Sinus rhythm  Probable left atrial enlargement  Left bundle branch block  Compared to ECG 2015 10:59:25  Left bundle-branch block now present     POC CoV-2, FLU A/B, RSV by PCR   Result Value Ref Range    POC Influenza A RNA, PCR Negative Negative    POC Influenza B RNA, PCR Negative Negative    POC RSV, by PCR Negative Negative    POC SARS-CoV-2, PCR NotDetected         RADIOLOGY  I have independently interpreted the diagnostic imaging associated with this visit and am waiting the final reading from the radiologist.   My preliminary interpretation is as follows: Bilateral infiltrates and pleural effusions on chest x-ray compared to prior.    CT-CTA CHEST PULMONARY ARTERY W/ RECONS   Final Result      1.  No evidence of pulmonary embolus.      2.  Bibasilar atelectasis and bilateral pleural effusions.      3.  Bilateral, left greater the right ill-defined groundglass and interstitial infiltrates which may represent presence of atypical infection versus interstitial edema or chronic interstitial lung disease.      4.  Borderline enlarged mediastinal lymph nodes.      5.  Atherosclerotic vascular calcification.      US-EXTREMITY VENOUS LOWER BILAT   Final Result      DX-CHEST-PORTABLE (1 VIEW)   Final Result      Small bilateral pleural effusions with compressive atelectasis and/or consolidation.      Some mild patchy left mid lung opacity could be asymmetric edema and/or infection.      EC-ECHOCARDIOGRAM COMPLETE W/O CONT    (Results Pending)          COURSE &  MEDICAL DECISION MAKING  Nursing notes, VS, PMSFHx reviewed in chart.     Review of past medical records shows the patient was last seen in the office November 14 2023 with diagnosis of right foot plantar wart, dyslipidemia, hypertension, osteoporosis, macular degeneration.      Differential diagnoses considered include but are not limited to: AMI, pericardial effusion/tamponade, pericarditis, CHF/pulm edema, PE, pneumothorax, pneumonia, pleural effusion, bronchospasm, bronchitis, respiratory infection, respiratory failure, sepsis       ED Observation Status? Yes; I am placing the patient in to an observation status due to a diagnostic uncertainty as well as therapeutic intensity. Patient placed in observation status at 6:17 PM, 2/22/2024.     Observation plan is as follows: We will obtain EKG, imaging and laboratory studies and monitor patient in the ED.    Upon Reevaluation, the patient's condition has: not improved; and will be escalated to hospitalization.    Patient discharged from ED Observation status at 2227 on February 22, 2024.       INITIAL ASSESSMENT AND PLAN  Care Narrative: This is a 89-year-old female patient with medical history including breast cancer, hypertension, high cholesterol, arthritis, osteoporosis who presents to the ED with bilateral lower extremity swelling, cough and itchy redness on her back.  Will obtain EKG, imaging and laboratory studies and closely monitor patient in the ED.      Discussion of management with other QHP or appropriate source(s): Hospitalist    2227: D/W Dr. Kelly, hospitalist, who will admit patient      History and physical exam as above.  EKG shows sinus rhythm with QT prolongation and new LBBB compared to prior EKG.  Troponin with mild elevation without increase on delta troponins to suggest that this is ACS.  On chest x-ray, patient with findings of bilateral infiltrates and pleural effusions.  Rocephin and Zithromax initiated after blood cultures.  Fluid  resuscitation was not initiated due to concern for CHF with pleural effusion and patient also with elevated BNP.  Patient without leukocytosis, elevation of lactic acid or procalcitonin and afebrile in the ED.  This is unlikely to be sepsis.  She underwent CTPA due to elevated D-dimer with findings as above.  Bilateral lower extremity ultrasound performed for her lower extremity swelling without evidence for DVT.  She received Claritin for her itchy rash on her back with slight improvement.  Patient also is requiring supplemental oxygen to maintain adequate saturation and is not on oxygen at baseline.  I discussed the findings with patient and daughter.  Given the findings as well as her oxygen requirement, they are agreeable to admission.  I discussed with the hospitalist who graciously agreed to admit patient for further care.      CRITICAL CARE NOTE:  Total critical care time spent on this patient was 30 minutes exclusive of separately billable procedures.  This may include direct bedside patient care, speaking with family members, review of past medical records, reviewing the results of laboratory/diagnostic studies, consulting with other physicians, as well as evaluating the response to the therapy instituted.      FINAL IMPRESSION  1. Acute cough Acute   2. Leg swelling Acute   3. Rash Acute   4. Bilateral pleural effusion Active   5. Bilateral pulmonary infiltrates Active   6. Hypoxia Acute          DISPOSITION  Patient will be admitted by hospitalist in guarded condition      Electronically signed by: Alirio Mg D.O., 2/22/2024 6:16 PM      Portions of this record were made with voice recognition software.  Despite my review, errors may remain.  Please interpret this chart in the appropriate context.

## 2024-02-23 NOTE — PROGRESS NOTES
Monitor summary:        Rhythm: SR, ST  Rate:   Ectopy: (R)PAC, (O)PVC, (R)coup  Measurements: .12/.12/.41        12hr chart check

## 2024-02-23 NOTE — PROGRESS NOTES
4 Eyes Skin Assessment Completed by RUDI Snow and RUDI Steward.    Head WDL  Ears WDL  Nose WDL  Mouth WDL  Neck WDL  Breast/Chest Scar Left mastectomy  Shoulder Blades WDL  Spine WDL  (R) Arm/Elbow/Hand WDL  (L) Arm/Elbow/Hand WDL  Abdomen Rash  Groin WDL  Scrotum/Coccyx/Buttocks WDL  (R) Leg Scar old knee surgery, edema  (L) Leg WDL edema  (R) Heel/Foot/Toe WDL  (L) Heel/Foot/Toe WDL          Devices In Places Tele Box, Blood Pressure Cuff, and Pulse Ox      Interventions In Place Gray Ear Foams, NC W/Ear Foams, and Pillows    Possible Skin Injury No    Pictures Uploaded Into Epic N/A  Wound Consult Placed N/A  RN Wound Prevention Protocol Ordered No

## 2024-02-23 NOTE — ED NOTES
Med rec complete per pt/home pharmacy  Allergies reviewed.   Outpatient antibiotics in the last 30 days? No   Anticoagulants taken in the last 14 days? No    Pharmacy patient utilizes: [facility]    Harmony Cash CPhT

## 2024-02-23 NOTE — THERAPY
Occupational Therapy   Initial Evaluation     Patient Name: Lizz Saldana  Age:  89 y.o., Sex:  female  Medical Record #: [id number]  Today's Date: 2/23/2024     Precautions  Precautions: Fall Risk    Assessment    Patient is 89 y.o. female admitted for LE swelling, and cough. Pt found to have volume overload, CHF, acute respiratory failure with hypoxia, and HTN. Other pertinent medical history includes age related debility, dyslipidemia, and breast cancer. Pt seen for OT evaluation. Pt donned/doffed socks with mod A, stood to wash hands with CGA, performed toilet hygiene/transfer w/ SBA-CGA, bed mobility with min A, and functional mobility with SBA-CGA. Pt was admitted from an independent living facility where she lives with her spouse. She provides assist for her spouse as needed. Pt educated regarding the role of OT and the pathology of bedrest. Pt current functional performance limited by impaired activity tolerance, impaired balance, and generalized weakness. Pt will benefit from skilled OT while admitted to acute care.     Plan    Occupational Therapy Initial Treatment Plan   Treatment Interventions: Self Care / Activities of Daily Living, Adaptive Equipment, Neuro Re-Education / Balance, Therapeutic Exercises, Therapeutic Activity  Treatment Frequency: 3 Times per Week  Duration: Until Therapy Goals Met    DC Equipment Recommendations: Unable to determine at this time  Discharge Recommendations: Recommend post-acute placement for additional occupational therapy services prior to discharge home (may progress to home with home health)      Objective     02/23/24 0858   Prior Living Situation   Prior Services   (support for IADLs from service)   Housing / Facility Independent Living Facility   Steps Into Home 0   Steps In Home 0   Bathroom Set up Walk In Shower;Grab Bars;Shower Chair   Equipment Owned Front-Wheel Walker;Single Point Cane   Lives with - Patient's Self Care Capacity Spouse   Comments Pt normally  provides  assist for her spouse. She reports this has been becoming increasingly difficult.   Prior Level of ADL Function   Self Feeding Independent   Grooming / Hygiene Independent   Bathing Independent   Dressing Independent   Toileting Independent   Prior Level of IADL Function   Medication Management Independent   Laundry Requires Assist   Home Management Requires Assist   Shopping Dependent   Prior Level Of Mobility   (initially stated she does not use a device, but later stated that she does)   Driving / Transportation Relatives / Others Provide Transportation   Occupation (Pre-Hospital Vocational) Retired Due To Age   History of Falls   History of Falls No   Date of Last Fall   (denied history of falls)   Precautions   Precautions Fall Risk   Vitals   Pulse 97   Pulse Oximetry 90 %  (dropped to 88 with activity with recovery to 90 with seated rest break)   O2 Delivery Device None - Room Air   Pain   Pain Scales 0 to 10 Scale    Pain 0 - 10 Group   Therapist Pain Assessment Post Activity Pain Same as Prior to Activity;Nurse Notified  (not  rated, agreeable to eval)   Cognition    Cognition / Consciousness WDL   Comments Alert, very pleasant and cooperative   Passive ROM Upper Body   Passive ROM Upper Body WDL   Active ROM Upper Body   Active ROM Upper Body  WDL   Strength Upper Body   Upper Body Strength  WDL   Sensation Upper Body   Upper Extremity Sensation  WDL   Upper Body Muscle Tone   Upper Body Muscle Tone  WDL   Coordination Upper Body   Coordination WDL   Balance Assessment   Sitting Balance (Static) Fair +   Sitting Balance (Dynamic) Fair   Standing Balance (Static) Fair   Standing Balance (Dynamic) Fair -   Weight Shift Sitting Fair   Weight Shift Standing Fair   Comments w/ FWW   Bed Mobility    Supine to Sit Minimal Assist   Sit to Supine   (up to chair post)   Scooting Standby Assist   Rolling Standby Assist   Comments HOB slightly elevated, pt became short of breath with HOB lower and requested  having it slightly elevated   ADL Assessment   Grooming Contact Guard Assist;Standing  (washed hands at sink, cues for safety)   Lower Body Dressing Moderate Assist  (don/doff socks)   Toileting Standby Assist  (urine on toilet)   Functional Mobility   Sit to Stand Minimal Assist  (fluctuated between CGA-min A)   Bed, Chair, Wheelchair Transfer Contact Guard Assist   Toilet Transfers Minimal Assist   Transfer Method Stand Step   Mobility EOB>bathroom>chair   Comments w/ FWW   Visual Perception   Visual Perception  Not Tested   Activity Tolerance   Sitting in Chair up to chair post, encouraged pt to sit up for at least 30 minutes   Sitting Edge of Bed <5 min   Standing ~5-10 min   Comments limited by weakness and increased work of breathing   Patient / Family Goals   Patient / Family Goal #1 to get stronger   Short Term Goals   Short Term Goal # 1 Pt will perform LB dressing w/ supv   Short Term Goal # 2 Pt will perform ADL transfer w/ supv   Short Term Goal # 3 Pt will complete full standing g/h routine with no reports of fatigue   Education Group   Education Provided Role of Occupational Therapist;Activities of Daily Living;Pathology of bedrest   Role of Occupational Therapist Patient Response Patient;Acceptance;Explanation;Verbal Demonstration   ADL Patient Response Patient;Acceptance;Explanation;Demonstration;Verbal Demonstration;Action Demonstration   Pathology of Bedrest Patient Response Patient;Acceptance;Explanation;Demonstration;Verbal Demonstration;Action Demonstration   Occupational Therapy Initial Treatment Plan    Treatment Interventions Self Care / Activities of Daily Living;Adaptive Equipment;Neuro Re-Education / Balance;Therapeutic Exercises;Therapeutic Activity   Treatment Frequency 3 Times per Week   Duration Until Therapy Goals Met   Problem List   Problem List Decreased Homemaking Skills;Decreased Active Daily Living Skills;Decreased Functional Mobility;Decreased Activity Tolerance;Impaired  Postural Control / Balance

## 2024-02-23 NOTE — PROGRESS NOTES
Hospital Medicine Daily Progress Note    Date of Service  2/23/2024    Chief Complaint  Lizz Saldana is a 89 y.o. female admitted 2/22/2024 with leg swelling     Hospital Course  This is a  89 y.o. fairly independent elderly female with history of hypertension who presented 2/22/2024 with evaluation for increased lower extremity swelling, cough.  Patient reported not feeling well for the past several days.  She endorsed subjective fever, chills, cough associated shortness of breath and increased lower extremity swelling.  In ER, no leukocytosis, noted to have elevated troponin T and proBNP.  CTA chest did not show PE but noted bilateral pleural effusion.  Lower extremity Doppler did not show DVT.  She tested negative for COVID/influenza/RSV   Patient admitted for further work up and treatment     Interval Problem Update  Patient seen and examined, denies any chest pain, no nausea or vomiting lowere extremity swelling cont on IV lasix. Also bilateral pleural effusion and thoracentesis has been ordered   Echo pending  Close monitoring on tele     I have discussed this patient's plan of care and discharge plan at IDT rounds today with Case Management, Nursing, Nursing leadership, and other members of the IDT team.    Consultants/Specialty  None     Code Status  DNAR/DNI    Disposition  The patient is not medically cleared for discharge to home or a post-acute facility.      I have placed the appropriate orders for post-discharge needs.    Review of Systems  Review of Systems   Constitutional:  Positive for chills, fever and malaise/fatigue.   HENT:  Positive for hearing loss. Negative for tinnitus.    Eyes:  Negative for blurred vision and double vision.   Respiratory:  Positive for cough, shortness of breath and wheezing. Negative for hemoptysis and sputum production.    Cardiovascular:  Positive for orthopnea and leg swelling. Negative for chest pain and palpitations.   Gastrointestinal:  Negative for abdominal  pain, heartburn, nausea and vomiting.   Genitourinary:  Negative for dysuria and urgency.   Musculoskeletal:  Negative for joint pain and myalgias.   Skin:  Negative for itching and rash.   Neurological:  Positive for weakness. Negative for dizziness and headaches.   Endo/Heme/Allergies:  Negative for environmental allergies. Does not bruise/bleed easily.   Psychiatric/Behavioral:  Negative for depression and substance abuse.    All other systems reviewed and are negative.       Physical Exam  Temp:  [36.3 °C (97.3 °F)-36.6 °C (97.9 °F)] 36.3 °C (97.3 °F)  Pulse:  [] 84  Resp:  [18-20] 18  BP: ()/(55-92) 93/55  SpO2:  [90 %-96 %] 93 %    Physical Exam  Vitals and nursing note reviewed.   Constitutional:       General: She is not in acute distress.     Appearance: She is ill-appearing.   HENT:      Head: Normocephalic and atraumatic.      Ears:      Comments: Presbycusis     Nose: Nose normal.      Mouth/Throat:      Mouth: Mucous membranes are moist.      Pharynx: Oropharynx is clear.   Eyes:      General: No scleral icterus.  Cardiovascular:      Rate and Rhythm: Normal rate and regular rhythm.      Pulses: Normal pulses.      Heart sounds:      No friction rub.   Pulmonary:      Effort: No respiratory distress.      Breath sounds: Wheezing (minimal) and rales present.      Comments: Bibasilar crackles (R>L)  Chest:      Chest wall: No tenderness.   Abdominal:      General: There is no distension.      Tenderness: There is no abdominal tenderness. There is no guarding or rebound.   Musculoskeletal:         General: No tenderness.      Cervical back: Neck supple. No tenderness.      Right lower leg: Edema present.      Left lower leg: Edema present.   Skin:     General: Skin is warm and dry.      Capillary Refill: Capillary refill takes less than 2 seconds.   Neurological:      General: No focal deficit present.      Mental Status: She is alert and oriented to person, place, and time.      Motor:  Weakness (Generalized, nonfocal) present.   Psychiatric:         Mood and Affect: Mood normal.         Fluids    Intake/Output Summary (Last 24 hours) at 2/23/2024 1359  Last data filed at 2/23/2024 0900  Gross per 24 hour   Intake 290 ml   Output 600 ml   Net -310 ml       Laboratory  Recent Labs     02/22/24  1612 02/23/24  0344   WBC 8.2 7.3   RBC 4.56 4.59   HEMOGLOBIN 13.8 14.1   HEMATOCRIT 41.0 41.3   MCV 89.9 90.0   MCH 30.3 30.7   MCHC 33.7 34.1   RDW 45.1 45.2   PLATELETCT 358 320   MPV 9.4 9.8     Recent Labs     02/22/24  1612 02/23/24  0344   SODIUM 142 142   POTASSIUM 3.7 3.9   CHLORIDE 106 105   CO2 24 24   GLUCOSE 112* 98   BUN 26* 19   CREATININE 0.56 0.47*   CALCIUM 8.6 8.2*     Recent Labs     02/23/24  0745   INR 1.03               Imaging  CT-CTA CHEST PULMONARY ARTERY W/ RECONS   Final Result      1.  No evidence of pulmonary embolus.      2.  Bibasilar atelectasis and bilateral pleural effusions.      3.  Bilateral, left greater the right ill-defined groundglass and interstitial infiltrates which may represent presence of atypical infection versus interstitial edema or chronic interstitial lung disease.      4.  Borderline enlarged mediastinal lymph nodes.      5.  Atherosclerotic vascular calcification.      US-EXTREMITY VENOUS LOWER BILAT   Final Result      DX-CHEST-PORTABLE (1 VIEW)   Final Result      Small bilateral pleural effusions with compressive atelectasis and/or consolidation.      Some mild patchy left mid lung opacity could be asymmetric edema and/or infection.      EC-ECHOCARDIOGRAM COMPLETE W/O CONT    (Results Pending)   US-THORACENTESIS PUNCTURE RIGHT    (Results Pending)        Assessment/Plan  * Volume overload- (present on admission)  Assessment & Plan  Diuresis as tolerated    Age-related physical debility  Assessment & Plan  PT/OT    Acute respiratory failure with hypoxia (HCC)  Assessment & Plan  On 2 L-wean off as tolerated  Diuresis  Nebs, pulmonary hygiene  Tested  negative for COVID/influenza/RSV  Ct chest showing pleural effusion going for thoracentesis     Bilateral leg edema  Assessment & Plan  Diuresis  No DVT seen on lower extremity Doppler    Elevated troponin  Assessment & Plan  Probable demand ischemia, volume overload  ASA/statin  Check echo    CHF (congestive heart failure) (HCC)  Assessment & Plan  Suspected acute exacerbation  Diuresis as tolerated-Lasix 20 mg IV twice daily  Wean off oxygen support as tolerated  echo pending   Optimize CHF meds able to tolerate    ACP (advance care planning)- (present on admission)  Assessment & Plan  Goal care discussed with patient in T7.  She stated she does not wish for aggressive/heroic life-sustaining measures-no CPR/defibrillation/intubation or mechanical ventilation.  She is however agreeable any other noninvasive medical management as clinically warranted.  Diagnosis, prognosis, questions or concern addressed.  DNR/denies status confirmed.  ACP: 16 minutes    HTN (hypertension), benign- (present on admission)  Assessment & Plan  Cont on IV lasix   Monitor and adjust medication as needed     HX: breast cancer- (present on admission)  Assessment & Plan  Prior history   outpatient follow-up    Dyslipidemia- (present on admission)  Assessment & Plan  Statin         VTE prophylaxis: heparin ppx     Greater than 51 minutes spent prepping to see patient (e.g. review of tests) obtaining and/or reviewing separately obtained history. Performing a medically appropriate examination and/ evaluation.  Counseling and educating the patient/family/caregiver.  Ordering medications, tests, or procedures.  Referring and communicating with other health care professionals.  Documenting clinical information in EPIC.  Independently interpreting results and communicating results to patient/family/caregiver.  Care coordination.      I have performed a physical exam and reviewed and updated ROS and Plan today (2/23/2024). In review of yesterday's  note (2/22/2024), there are no changes except as documented above.

## 2024-02-23 NOTE — H&P
Hospital Medicine History & Physical Note    Date of Service  2/22/2024    Primary Care Physician  YOLI Mcmillan.    Consultants  None    Code Status  DNAR/DNI    Chief Complaint  Chief Complaint   Patient presents with    Leg Swelling    Rash    Cough       History of Presenting Illness  Lizz Saldana is a 89 y.o. fairly independent elderly female with history of hypertension who presented 2/22/2024 with evaluation for increased lower extremity swelling, cough.  Patient reported not feeling well for the past several days.  She endorsed subjective fever, chills, cough associated shortness of breath and increased lower extremity swelling.  In ER, no leukocytosis, noted to have elevated troponin T and proBNP.  CTA chest did not show PE but noted bilateral pleural effusion.  Lower extremity Doppler did not show DVT.  She tested negative for COVID/influenza/RSV.  She is Requiring 2 L nasal cannula support.  Therefore admission requested by ERP.  Admitted to medicine service for further evaluation and treatment.    I discussed the plan of care with patient, bedside RN, and pharmacy.    Review of Systems  Review of Systems   Constitutional:  Positive for chills, fever and malaise/fatigue.   HENT:  Positive for hearing loss. Negative for tinnitus.    Eyes:  Negative for blurred vision and double vision.   Respiratory:  Positive for cough, shortness of breath and wheezing. Negative for hemoptysis and sputum production.    Cardiovascular:  Positive for orthopnea and leg swelling. Negative for chest pain and palpitations.   Gastrointestinal:  Negative for abdominal pain, heartburn, nausea and vomiting.   Genitourinary:  Negative for dysuria and urgency.   Musculoskeletal:  Negative for joint pain and myalgias.   Skin:  Negative for itching and rash.   Neurological:  Positive for weakness. Negative for dizziness and headaches.   Endo/Heme/Allergies:  Negative for environmental allergies. Does not  bruise/bleed easily.   Psychiatric/Behavioral:  Negative for depression and substance abuse.    All other systems reviewed and are negative.      Past Medical History   has a past medical history of Arthritis (2015), Bilateral cataracts, Cancer (HCC) (1990), Closed fracture of distal end of right radius (10/24/2022), DJD (degenerative joint disease), Encounter to establish care (08/29/2023), High cholesterol, HTN, breast cancer, Hyperlipidemia, Osteoporosis, Pain (07/2015), Pigmented skin lesion (04/30/2015), Postherpetic neuralgia, and Tubular adenoma.    Surgical History   has a past surgical history that includes rotator cuff repair (1990); cataract extraction with iol (2000); cyst excision (Left, 1989); mastectomy (1990); hysterectomy, total abdominal (1970); knee arthroplasty total (Right, 7/13/2015); and knee manipulation (Right, 10/12/2015).     Family History  family history includes Hypertension in her mother; Other in her father and sister.   Family history reviewed with patient. There is no family history that is pertinent to the chief complaint.     Social History   reports that she has never smoked. She has never used smokeless tobacco. She reports that she does not drink alcohol and does not use drugs.    Allergies  Allergies   Allergen Reactions    Codeine Nausea    Floxin [Ofloxacin] Itching and Swelling    Lidoderm      rash    Methylprednisolone Sodium Succ Rash and Itching     Painful skin blistering LE    Toradol Unspecified     .       Medications  Prior to Admission Medications   Prescriptions Last Dose Informant Patient Reported? Taking?   Cholecalciferol (VITAMIN D3) 2000 units Tab  Patient Yes No   Sig: Take 5,000 Units by mouth every day.   Multiple Vitamins-Calcium (ONE-A-DAY WOMENS PO)  Patient Yes No   Sig: Take  by mouth every day.   Multiple Vitamins-Minerals (OCUVITE PRESERVISION PO)   Yes No   Sig: Take 1 Tab by mouth 2 Times a Day. PreserVision AREDS 2   felodipine ER (PLENDIL) 10  MG TABLET SR 24 HR   No No   Sig: TAKE 1 TABLET DAILY   fenofibrate (TRICOR) 48 MG Tab   No No   Sig: TAKE 1 TABLET DAILY      Facility-Administered Medications: None       Physical Exam  Temp:  [36.4 °C (97.5 °F)-36.6 °C (97.9 °F)] 36.6 °C (97.9 °F)  Pulse:  [] 96  Resp:  [20] 20  BP: (135-164)/(77-83) 141/78  SpO2:  [94 %-96 %] 94 %  Blood Pressure : (!) 164/77   Temperature: 36.4 °C (97.5 °F)   Pulse: 94   Respiration: 20   Pulse Oximetry: 96 %       Physical Exam  Vitals and nursing note reviewed.   Constitutional:       General: She is not in acute distress.     Appearance: She is ill-appearing.   HENT:      Head: Normocephalic and atraumatic.      Ears:      Comments: Presbycusis     Nose: Nose normal.      Mouth/Throat:      Mouth: Mucous membranes are moist.      Pharynx: Oropharynx is clear.   Eyes:      General: No scleral icterus.  Cardiovascular:      Rate and Rhythm: Normal rate and regular rhythm.      Pulses: Normal pulses.      Heart sounds:      No friction rub.   Pulmonary:      Effort: No respiratory distress.      Breath sounds: Wheezing (minimal) and rales present.      Comments: Bibasilar crackles (R>L)  Chest:      Chest wall: No tenderness.   Abdominal:      General: There is no distension.      Tenderness: There is no abdominal tenderness. There is no guarding or rebound.   Musculoskeletal:         General: No tenderness.      Cervical back: Neck supple. No tenderness.      Right lower leg: Edema present.      Left lower leg: Edema present.   Skin:     General: Skin is warm and dry.      Capillary Refill: Capillary refill takes less than 2 seconds.   Neurological:      General: No focal deficit present.      Mental Status: She is alert and oriented to person, place, and time.      Motor: Weakness (Generalized, nonfocal) present.   Psychiatric:         Mood and Affect: Mood normal.         Laboratory:  Recent Labs     02/22/24  1612   WBC 8.2   RBC 4.56   HEMOGLOBIN 13.8   HEMATOCRIT  41.0   MCV 89.9   MCH 30.3   MCHC 33.7   RDW 45.1   PLATELETCT 358   MPV 9.4     Recent Labs     02/22/24  1612   SODIUM 142   POTASSIUM 3.7   CHLORIDE 106   CO2 24   GLUCOSE 112*   BUN 26*   CREATININE 0.56   CALCIUM 8.6     Recent Labs     02/22/24  1612   ALTSGPT 52*   ASTSGOT 40   ALKPHOSPHAT 121*   TBILIRUBIN 1.0   GLUCOSE 112*         Recent Labs     02/22/24  1837   NTPROBNP 1856*         Recent Labs     02/22/24  1612 02/22/24  1837 02/22/24  1928   TROPONINT 24* 24* 25*       Imaging:  CT-CTA CHEST PULMONARY ARTERY W/ RECONS   Final Result      1.  No evidence of pulmonary embolus.      2.  Bibasilar atelectasis and bilateral pleural effusions.      3.  Bilateral, left greater the right ill-defined groundglass and interstitial infiltrates which may represent presence of atypical infection versus interstitial edema or chronic interstitial lung disease.      4.  Borderline enlarged mediastinal lymph nodes.      5.  Atherosclerotic vascular calcification.      US-EXTREMITY VENOUS LOWER BILAT   Final Result      DX-CHEST-PORTABLE (1 VIEW)   Final Result      Small bilateral pleural effusions with compressive atelectasis and/or consolidation.      Some mild patchy left mid lung opacity could be asymmetric edema and/or infection.      EC-ECHOCARDIOGRAM COMPLETE W/O CONT    (Results Pending)   US-THORACENTESIS PUNCTURE RIGHT    (Results Pending)         X-Ray:  I have personally reviewed the images and compared with prior images.  EKG:  I have personally reviewed the images and compared with prior images.    Assessment/Plan:  Justification for Admission Status  I anticipate this patient will require at least 2 midnights hospitalization, therefore appropriate for inpatient status.        * Volume overload- (present on admission)  Assessment & Plan  Diuresis as tolerated    CHF (congestive heart failure) ([facility])  Assessment & Plan  Suspected acute exacerbation  Diuresis as tolerated-Lasix 20 mg IV twice daily  Wean  off oxygen support as tolerated  Check echo  Optimize CHF meds able to tolerate    Acute respiratory failure with hypoxia (HCC)  Assessment & Plan  On 2 L-wean off as tolerated  Diuresis  Nebs, pulmonary hygiene  Tested negative for COVID/influenza/RSV    Bilateral leg edema  Assessment & Plan  Diuresis  No DVT seen on lower extremity Doppler    Elevated troponin  Assessment & Plan  Probable demand ischemia, volume overload  ASA/statin  Check echo    HTN (hypertension), benign- (present on admission)  Assessment & Plan  On diuresis  Continue ACEI    Dyslipidemia- (present on admission)  Assessment & Plan  Statin    Age-related physical debility  Assessment & Plan  PT/OT    ACP (advance care planning)- (present on admission)  Assessment & Plan  Goal care discussed with patient in T7.  She stated she does not wish for aggressive/heroic life-sustaining measures-no CPR/defibrillation/intubation or mechanical ventilation.  She is however agreeable any other noninvasive medical management as clinically warranted.  Diagnosis, prognosis, questions or concern addressed.  DNR/denies status confirmed.  ACP: 16 minutes    HX: breast cancer- (present on admission)  Assessment & Plan  Prior history   outpatient follow-up        VTE prophylaxis: enoxaparin ppx

## 2024-02-23 NOTE — CARE PLAN
The patient is Stable - Low risk of patient condition declining or worsening    Shift Goals  Clinical Goals: Diurese, rest  Patient Goals: sleep  Family Goals: updates    Progress made toward(s) clinical / shift goals:      Problem: Care Map:  Admission Optimal Outcome for the Heart Failure Patient  Goal: Admission:  Optimal Care of the heart failure patient  Outcome: Progressing     Problem: Care Map:  Day 1 Optimal Outcome for the Heart Failure Patient  Goal: Day 1:  Optimal Care of the heart failure patient  Outcome: Progressing     Problem: Knowledge Deficit - Standard  Goal: Patient and family/care givers will demonstrate understanding of plan of care, disease process/condition, diagnostic tests and medications  Outcome: Progressing     Problem: Hemodynamics  Goal: Patient's hemodynamics, fluid balance and neurologic status will be stable or improve  Outcome: Progressing     Problem: Respiratory  Goal: Patient will achieve/maintain optimum respiratory ventilation and gas exchange  Outcome: Progressing       Patient is not progressing towards the following goals:

## 2024-02-23 NOTE — ED NOTES
.Bedside report received from off going RN/tech: RUDI Gonzalez, assumed care of patient.  POC discussed with patient. Call light within reach, all needs addressed at this time.       Fall risk interventions in place: Move the patient closer to the nurse's station, Patient's personal possessions are with in their safe reach, Place socks on patient, Place fall risk sign on patient's door, Give patient urinal if applicable, Keep floor surfaces clean and dry, and Accompanied to restroom (all applicable per [facility] Fall risk assessment)   Continuous monitoring: Cardiac Leads, Pulse Ox, or Blood Pressure  IVF/IV medications: Infusion per MAR (List Med(s)) Antibiotics  Oxygen: How many liters 1L  Bedside sitter: Not Applicable   Isolation: Not Applicable

## 2024-02-23 NOTE — ASSESSMENT & PLAN NOTE
Goal care discussed with patient in T7.  She stated she does not wish for aggressive/heroic life-sustaining measures-no CPR/defibrillation/intubation or mechanical ventilation.  She is however agreeable any other noninvasive medical management as clinically warranted.  Diagnosis, prognosis, questions or concern addressed.  DNR/denies status confirmed.  ACP: 16 minutes

## 2024-02-23 NOTE — ASSESSMENT & PLAN NOTE
Echo now showing EF of 30%   Cont on iV lasix  I have also consulted cardiology today appreciate rec.

## 2024-02-24 ENCOUNTER — APPOINTMENT (OUTPATIENT)
Dept: CARDIOLOGY | Facility: MEDICAL CENTER | Age: 89
DRG: 291 | End: 2024-02-24
Attending: INTERNAL MEDICINE
Payer: MEDICARE

## 2024-02-24 ENCOUNTER — APPOINTMENT (OUTPATIENT)
Dept: RADIOLOGY | Facility: MEDICAL CENTER | Age: 89
DRG: 291 | End: 2024-02-24
Attending: STUDENT IN AN ORGANIZED HEALTH CARE EDUCATION/TRAINING PROGRAM
Payer: MEDICARE

## 2024-02-24 LAB
AMYLASE FLD-CCNC: 22 U/L
ANION GAP SERPL CALC-SCNC: 12 MMOL/L (ref 7–16)
APPEARANCE FLD: CLEAR
BODY FLD TYPE: NORMAL
BUN SERPL-MCNC: 19 MG/DL (ref 8–22)
CALCIUM SERPL-MCNC: 8.4 MG/DL (ref 8.5–10.5)
CELLS FLD: 2
CHLORIDE SERPL-SCNC: 105 MMOL/L (ref 96–112)
CO2 SERPL-SCNC: 24 MMOL/L (ref 20–33)
COLOR FLD: YELLOW
CREAT SERPL-MCNC: 0.47 MG/DL (ref 0.5–1.4)
CYTOLOGY REG CYTOL: NORMAL
EKG IMPRESSION: NORMAL
EOSINOPHIL NFR FLD: 2 %
ERYTHROCYTE [DISTWIDTH] IN BLOOD BY AUTOMATED COUNT: 45.4 FL (ref 35.9–50)
GFR SERPLBLD CREATININE-BSD FMLA CKD-EPI: 87 ML/MIN/1.73 M 2
GLUCOSE FLD-MCNC: 150 MG/DL
GLUCOSE SERPL-MCNC: 100 MG/DL (ref 65–99)
GRAM STN SPEC: NORMAL
HCT VFR BLD AUTO: 40.2 % (ref 37–47)
HGB BLD-MCNC: 13.6 G/DL (ref 12–16)
LDH FLD L TO P-CCNC: 96 U/L
LV EJECT FRACT  99904: 30
LV EJECT FRACT MOD 2C 99903: 29.33
LV EJECT FRACT MOD 4C 99902: 26.92
LV EJECT FRACT MOD BP 99901: 27.59
LYMPHOCYTES NFR FLD: 71 %
MCH RBC QN AUTO: 30.4 PG (ref 27–33)
MCHC RBC AUTO-ENTMCNC: 33.8 G/DL (ref 32.2–35.5)
MCV RBC AUTO: 89.9 FL (ref 81.4–97.8)
MONOS+MACROS NFR FLD MANUAL: 24 %
NEUTROPHILS NFR FLD: 1 %
NUC CELL # FLD: 794 CELLS/UL
PH FLD: 8 [PH]
PLATELET # BLD AUTO: 351 K/UL (ref 164–446)
PMV BLD AUTO: 9.3 FL (ref 9–12.9)
POTASSIUM SERPL-SCNC: 3.9 MMOL/L (ref 3.6–5.5)
PROT FLD-MCNC: 1.6 G/DL
RBC # BLD AUTO: 4.47 M/UL (ref 4.2–5.4)
RBC # FLD: <2000 CELLS/UL
SIGNIFICANT IND 70042: NORMAL
SITE SITE: NORMAL
SODIUM SERPL-SCNC: 141 MMOL/L (ref 135–145)
SOURCE SOURCE: NORMAL
WBC # BLD AUTO: 8 K/UL (ref 4.8–10.8)

## 2024-02-24 PROCEDURE — 88305 TISSUE EXAM BY PATHOLOGIST: CPT

## 2024-02-24 PROCEDURE — 700111 HCHG RX REV CODE 636 W/ 250 OVERRIDE (IP): Mod: JZ | Performed by: STUDENT IN AN ORGANIZED HEALTH CARE EDUCATION/TRAINING PROGRAM

## 2024-02-24 PROCEDURE — 99233 SBSQ HOSP IP/OBS HIGH 50: CPT | Performed by: INTERNAL MEDICINE

## 2024-02-24 PROCEDURE — 71045 X-RAY EXAM CHEST 1 VIEW: CPT

## 2024-02-24 PROCEDURE — 88112 CYTOPATH CELL ENHANCE TECH: CPT

## 2024-02-24 PROCEDURE — 89051 BODY FLUID CELL COUNT: CPT

## 2024-02-24 PROCEDURE — 84157 ASSAY OF PROTEIN OTHER: CPT

## 2024-02-24 PROCEDURE — 87102 FUNGUS ISOLATION CULTURE: CPT

## 2024-02-24 PROCEDURE — 770020 HCHG ROOM/CARE - TELE (206)

## 2024-02-24 PROCEDURE — 83615 LACTATE (LD) (LDH) ENZYME: CPT

## 2024-02-24 PROCEDURE — 93306 TTE W/DOPPLER COMPLETE: CPT

## 2024-02-24 PROCEDURE — 82945 GLUCOSE OTHER FLUID: CPT

## 2024-02-24 PROCEDURE — 85027 COMPLETE CBC AUTOMATED: CPT

## 2024-02-24 PROCEDURE — 700111 HCHG RX REV CODE 636 W/ 250 OVERRIDE (IP): Performed by: INTERNAL MEDICINE

## 2024-02-24 PROCEDURE — 80048 BASIC METABOLIC PNL TOTAL CA: CPT

## 2024-02-24 PROCEDURE — 83986 ASSAY PH BODY FLUID NOS: CPT

## 2024-02-24 PROCEDURE — 0W993ZZ DRAINAGE OF RIGHT PLEURAL CAVITY, PERCUTANEOUS APPROACH: ICD-10-PCS | Performed by: RADIOLOGY

## 2024-02-24 PROCEDURE — 87206 SMEAR FLUORESCENT/ACID STAI: CPT

## 2024-02-24 PROCEDURE — 700102 HCHG RX REV CODE 250 W/ 637 OVERRIDE(OP): Performed by: STUDENT IN AN ORGANIZED HEALTH CARE EDUCATION/TRAINING PROGRAM

## 2024-02-24 PROCEDURE — 93306 TTE W/DOPPLER COMPLETE: CPT | Mod: 26 | Performed by: INTERNAL MEDICINE

## 2024-02-24 PROCEDURE — 87205 SMEAR GRAM STAIN: CPT | Mod: 91

## 2024-02-24 PROCEDURE — 36415 COLL VENOUS BLD VENIPUNCTURE: CPT

## 2024-02-24 PROCEDURE — 4410569 US-THORACENTESIS PUNCTURE

## 2024-02-24 PROCEDURE — 93005 ELECTROCARDIOGRAM TRACING: CPT | Performed by: INTERNAL MEDICINE

## 2024-02-24 PROCEDURE — 87070 CULTURE OTHR SPECIMN AEROBIC: CPT

## 2024-02-24 PROCEDURE — 87015 SPECIMEN INFECT AGNT CONCNTJ: CPT

## 2024-02-24 PROCEDURE — 93010 ELECTROCARDIOGRAM REPORT: CPT | Performed by: INTERNAL MEDICINE

## 2024-02-24 PROCEDURE — 87116 MYCOBACTERIA CULTURE: CPT

## 2024-02-24 PROCEDURE — 82150 ASSAY OF AMYLASE: CPT

## 2024-02-24 PROCEDURE — A9270 NON-COVERED ITEM OR SERVICE: HCPCS | Performed by: STUDENT IN AN ORGANIZED HEALTH CARE EDUCATION/TRAINING PROGRAM

## 2024-02-24 RX ADMIN — DOXYCYCLINE 100 MG: 100 TABLET, FILM COATED ORAL at 05:07

## 2024-02-24 RX ADMIN — ENOXAPARIN SODIUM 40 MG: 100 INJECTION SUBCUTANEOUS at 17:47

## 2024-02-24 RX ADMIN — FUROSEMIDE 20 MG: 10 INJECTION INTRAMUSCULAR; INTRAVENOUS at 17:47

## 2024-02-24 RX ADMIN — FUROSEMIDE 20 MG: 10 INJECTION INTRAMUSCULAR; INTRAVENOUS at 05:07

## 2024-02-24 RX ADMIN — FELODIPINE 10 MG: 5 TABLET, EXTENDED RELEASE ORAL at 05:07

## 2024-02-24 RX ADMIN — ATORVASTATIN CALCIUM 20 MG: 20 TABLET, FILM COATED ORAL at 17:47

## 2024-02-24 RX ADMIN — ASPIRIN 81 MG: 81 TABLET, COATED ORAL at 05:07

## 2024-02-24 ASSESSMENT — PAIN DESCRIPTION - PAIN TYPE
TYPE: ACUTE PAIN
TYPE: ACUTE PAIN

## 2024-02-24 ASSESSMENT — FIBROSIS 4 INDEX: FIB4 SCORE: 1.76

## 2024-02-24 NOTE — PROGRESS NOTES
Monitor Summary   SR 85-98  Ectopy: (R/O) PAC, (O) PVC, (R) Coup up to 152 NS  .13/.11/.38

## 2024-02-24 NOTE — PROGRESS NOTES
Telemetry Shift Summary      Rhythm: SR  HR: 72-99  Ectopy: PAC, PVC, coup      Measurements: .14/.08/.36

## 2024-02-24 NOTE — PROGRESS NOTES
Hospital Medicine Daily Progress Note    Date of Service  2/24/2024    Chief Complaint  Lizz Saldana is a 89 y.o. female admitted 2/22/2024 with leg swelling     Hospital Course  This is a  89 y.o. fairly independent elderly female with history of hypertension who presented 2/22/2024 with evaluation for increased lower extremity swelling, cough.  Patient reported not feeling well for the past several days.  She endorsed subjective fever, chills, cough associated shortness of breath and increased lower extremity swelling.  In ER, no leukocytosis, noted to have elevated troponin T and proBNP.  CTA chest did not show PE but noted bilateral pleural effusion.  Lower extremity Doppler did not show DVT.  She tested negative for COVID/influenza/RSV   Patient admitted for further work up and treatment     Interval Problem Update  Patient seen and examined, denies any chest pain, no nausea or vomiting lowere extremity swelling cont on IV lasix. Also bilateral pleural effusion and thoracentesis has been ordered   Echo pending  Close monitoring on tele   2/24: Patient seen and examined ha t=right thoracentesis today . Cont on IV lasix   Wean off O2 as tolerated   Echo pending   I have discussed this patient's plan of care and discharge plan at IDT rounds today with Case Management, Nursing, Nursing leadership, and other members of the IDT team.    Consultants/Specialty  None     Code Status  DNAR/DNI    Disposition  The patient is not medically cleared for discharge to home or a post-acute facility.      I have placed the appropriate orders for post-discharge needs.    Review of Systems  Review of Systems   Constitutional:  Positive for chills, fever and malaise/fatigue.   HENT:  Positive for hearing loss. Negative for tinnitus.    Eyes:  Negative for blurred vision and double vision.   Respiratory:  Positive for cough, shortness of breath and wheezing. Negative for hemoptysis and sputum production.    Cardiovascular:   Positive for orthopnea and leg swelling. Negative for chest pain and palpitations.   Gastrointestinal:  Negative for abdominal pain, heartburn, nausea and vomiting.   Genitourinary:  Negative for dysuria and urgency.   Musculoskeletal:  Negative for joint pain and myalgias.   Skin:  Negative for itching and rash.   Neurological:  Positive for weakness. Negative for dizziness and headaches.   Endo/Heme/Allergies:  Negative for environmental allergies. Does not bruise/bleed easily.   Psychiatric/Behavioral:  Negative for depression and substance abuse.    All other systems reviewed and are negative.       Physical Exam  Temp:  [36.2 °C (97.2 °F)-36.6 °C (97.8 °F)] 36.4 °C (97.5 °F)  Pulse:  [] 97  Resp:  [16-20] 20  BP: ()/(52-74) 99/52  SpO2:  [90 %-95 %] 90 %    Physical Exam  Vitals and nursing note reviewed.   Constitutional:       General: She is not in acute distress.     Appearance: She is ill-appearing.   HENT:      Head: Normocephalic and atraumatic.      Ears:      Comments: Presbycusis     Nose: Nose normal.      Mouth/Throat:      Mouth: Mucous membranes are moist.      Pharynx: Oropharynx is clear.   Eyes:      General: No scleral icterus.  Cardiovascular:      Rate and Rhythm: Normal rate and regular rhythm.      Pulses: Normal pulses.      Heart sounds:      No friction rub.   Pulmonary:      Effort: No respiratory distress.      Breath sounds: Wheezing (minimal) and rales present.      Comments: Bibasilar crackles (R>L)  Chest:      Chest wall: No tenderness.   Abdominal:      General: There is no distension.      Tenderness: There is no abdominal tenderness. There is no guarding or rebound.   Musculoskeletal:         General: No tenderness.      Cervical back: Neck supple. No tenderness.      Right lower leg: Edema present.      Left lower leg: Edema present.   Skin:     General: Skin is warm and dry.      Capillary Refill: Capillary refill takes less than 2 seconds.   Neurological:       General: No focal deficit present.      Mental Status: She is alert and oriented to person, place, and time.      Motor: Weakness (Generalized, nonfocal) present.   Psychiatric:         Mood and Affect: Mood normal.         Fluids    Intake/Output Summary (Last 24 hours) at 2/24/2024 1305  Last data filed at 2/24/2024 1113  Gross per 24 hour   Intake 0 ml   Output 800 ml   Net -800 ml       Laboratory  Recent Labs     02/22/24  1612 02/23/24  0344 02/24/24  0453   WBC 8.2 7.3 8.0   RBC 4.56 4.59 4.47   HEMOGLOBIN 13.8 14.1 13.6   HEMATOCRIT 41.0 41.3 40.2   MCV 89.9 90.0 89.9   MCH 30.3 30.7 30.4   MCHC 33.7 34.1 33.8   RDW 45.1 45.2 45.4   PLATELETCT 358 320 351   MPV 9.4 9.8 9.3     Recent Labs     02/22/24  1612 02/23/24  0344 02/24/24  0453   SODIUM 142 142 141   POTASSIUM 3.7 3.9 3.9   CHLORIDE 106 105 105   CO2 24 24 24   GLUCOSE 112* 98 100*   BUN 26* 19 19   CREATININE 0.56 0.47* 0.47*   CALCIUM 8.6 8.2* 8.4*     Recent Labs     02/23/24  0745   INR 1.03               Imaging  EC-ECHOCARDIOGRAM COMPLETE W/O CONT         DX-CHEST-PORTABLE (1 VIEW)   Final Result      No pneumothorax status post right thoracentesis.      CT-CTA CHEST PULMONARY ARTERY W/ RECONS   Final Result      1.  No evidence of pulmonary embolus.      2.  Bibasilar atelectasis and bilateral pleural effusions.      3.  Bilateral, left greater the right ill-defined groundglass and interstitial infiltrates which may represent presence of atypical infection versus interstitial edema or chronic interstitial lung disease.      4.  Borderline enlarged mediastinal lymph nodes.      5.  Atherosclerotic vascular calcification.      US-EXTREMITY VENOUS LOWER BILAT   Final Result      DX-CHEST-PORTABLE (1 VIEW)   Final Result      Small bilateral pleural effusions with compressive atelectasis and/or consolidation.      Some mild patchy left mid lung opacity could be asymmetric edema and/or infection.      US-THORACENTESIS PUNCTURE RIGHT    (Results  Pending)        Assessment/Plan  * Volume overload- (present on admission)  Assessment & Plan  Diuresis as tolerated    Age-related physical debility  Assessment & Plan  PT/OT    Acute respiratory failure with hypoxia (HCC)  Assessment & Plan  On 2 L-wean off as tolerated  Diuresis  Nebs, pulmonary hygiene  Tested negative for COVID/influenza/RSV  Ct chest showing pleural effusion going for thoracentesis     Bilateral leg edema  Assessment & Plan  Diuresis  No DVT seen on lower extremity Doppler    Elevated troponin  Assessment & Plan  Probable demand ischemia, volume overload  ASA/statin  Check echo    CHF (congestive heart failure) (HCC)  Assessment & Plan  Suspected acute exacerbation  Diuresis as tolerated-Lasix 20 mg IV twice daily  Wean off oxygen support as tolerated  echo pending   Optimize CHF meds able to tolerate    ACP (advance care planning)- (present on admission)  Assessment & Plan  Goal care discussed with patient in T7.  She stated she does not wish for aggressive/heroic life-sustaining measures-no CPR/defibrillation/intubation or mechanical ventilation.  She is however agreeable any other noninvasive medical management as clinically warranted.  Diagnosis, prognosis, questions or concern addressed.  DNR/denies status confirmed.  ACP: 16 minutes    HTN (hypertension), benign- (present on admission)  Assessment & Plan  Cont on IV lasix   Monitor and adjust medication as needed     HX: breast cancer- (present on admission)  Assessment & Plan  Prior history   outpatient follow-up    Dyslipidemia- (present on admission)  Assessment & Plan  Statin         Greater than 51 minutes spent prepping to see patient (e.g. review of tests) obtaining and/or reviewing separately obtained history. Performing a medically appropriate examination and/ evaluation.  Counseling and educating the patient/family/caregiver.  Ordering medications, tests, or procedures.  Referring and communicating with other health care  professionals.  Documenting clinical information in EPIC.  Independently interpreting results and communicating results to patient/family/caregiver.  Care coordination.      VTE prophylaxis: heparin ppx     I have performed a physical exam and reviewed and updated ROS and Plan today (2/24/2024). In review of yesterday's note (2/23/2024), there are no changes except as documented above.

## 2024-02-24 NOTE — CARE PLAN
The patient is Stable - Low risk of patient condition declining or worsening    Shift Goals  Clinical Goals: Decrease edema, reorient patient, wean O2, sleep  Patient Goals: sleep  Family Goals: updates    Progress made toward(s) clinical / shift goals:      Problem: Care Map:  Admission Optimal Outcome for the Heart Failure Patient  Goal: Admission:  Optimal Care of the heart failure patient  Outcome: Progressing     Problem: Care Map:  Day 1 Optimal Outcome for the Heart Failure Patient  Goal: Day 1:  Optimal Care of the heart failure patient  Outcome: Progressing     Problem: Hemodynamics  Goal: Patient's hemodynamics, fluid balance and neurologic status will be stable or improve  Outcome: Progressing     Problem: Urinary - Renal Perfusion  Goal: Ability to achieve and maintain adequate renal perfusion and functioning will improve  Outcome: Progressing     Problem: Respiratory  Goal: Patient will achieve/maintain optimum respiratory ventilation and gas exchange  Outcome: Progressing     Problem: Physical Regulation  Goal: Diagnostic test results will improve  Outcome: Progressing       Patient is not progressing towards the following goals:

## 2024-02-24 NOTE — CARE PLAN
The patient is Stable - Low risk of patient condition declining or worsening    Shift Goals  Clinical Goals: decrease LE swelling, wean o2, PT/OT  Patient Goals: rest, feel better  Family Goals: update    Patient remains on 2lnc, being diuresed. Worked with PT/OT today. Rash on body improving, benadryl cream used. Still needs thora. VSS    Progress made toward(s) clinical / shift goals:    Problem: Care Map:  Admission Optimal Outcome for the Heart Failure Patient  Goal: Admission:  Optimal Care of the heart failure patient  Outcome: Progressing     Problem: Care Map:  Day 1 Optimal Outcome for the Heart Failure Patient  Goal: Day 1:  Optimal Care of the heart failure patient  Outcome: Progressing     Problem: Care Map:  Day 2 Optimal Outcome for the Heart Failure Patient  Goal: Day 2:  Optimal Care of the heart failure patient  Outcome: Progressing     Problem: Care Map:  Day 3 Optimal Outcome for the Heart Failure Patient  Goal: Day 3:  Optimal Care of the heart failure patient  Outcome: Progressing     Problem: Care Map:  Day Before Discharge Optimal Outcome for the Heart Failure Patient  Goal: Day Before Discharge:  Optimal Care of the heart failure patient  Outcome: Progressing     Problem: Care Map:  Day of Discharge Optimal Outcome for the Heart Failure Patient  Goal: Day of Discharge:  Optimal Care of the heart failure patient  Outcome: Progressing     Problem: Knowledge Deficit - Standard  Goal: Patient and family/care givers will demonstrate understanding of plan of care, disease process/condition, diagnostic tests and medications  Outcome: Progressing     Problem: Hemodynamics  Goal: Patient's hemodynamics, fluid balance and neurologic status will be stable or improve  Outcome: Progressing     Problem: Fluid Volume  Goal: Fluid volume balance will be maintained  Outcome: Progressing     Problem: Urinary - Renal Perfusion  Goal: Ability to achieve and maintain adequate renal perfusion and functioning will  improve  Outcome: Progressing     Problem: Respiratory  Goal: Patient will achieve/maintain optimum respiratory ventilation and gas exchange  Outcome: Progressing     Problem: Mechanical Ventilation  Goal: Safe management of artificial airway and ventilation  Outcome: Progressing  Goal: Successful weaning off mechanical ventilator, spontaneously maintains adequate gas exchange  Outcome: Progressing  Goal: Patient will be able to express needs and understand communication  Outcome: Progressing     Problem: Physical Regulation  Goal: Diagnostic test results will improve  Outcome: Progressing  Goal: Signs and symptoms of infection will decrease  Outcome: Progressing     Problem: Fall Risk  Goal: Patient will remain free from falls  Outcome: Progressing       Patient is not progressing towards the following goals:

## 2024-02-25 LAB
ANION GAP SERPL CALC-SCNC: 13 MMOL/L (ref 7–16)
BACTERIA UR CULT: NORMAL
BUN SERPL-MCNC: 26 MG/DL (ref 8–22)
CALCIUM SERPL-MCNC: 8.3 MG/DL (ref 8.5–10.5)
CHLORIDE SERPL-SCNC: 103 MMOL/L (ref 96–112)
CO2 SERPL-SCNC: 24 MMOL/L (ref 20–33)
CREAT SERPL-MCNC: 0.6 MG/DL (ref 0.5–1.4)
ERYTHROCYTE [DISTWIDTH] IN BLOOD BY AUTOMATED COUNT: 44.5 FL (ref 35.9–50)
FUNGUS SPEC FUNGUS STN: NORMAL
GFR SERPLBLD CREATININE-BSD FMLA CKD-EPI: 82 ML/MIN/1.73 M 2
GLUCOSE SERPL-MCNC: 126 MG/DL (ref 65–99)
HCT VFR BLD AUTO: 40.7 % (ref 37–47)
HGB BLD-MCNC: 13.8 G/DL (ref 12–16)
MCH RBC QN AUTO: 30.6 PG (ref 27–33)
MCHC RBC AUTO-ENTMCNC: 33.9 G/DL (ref 32.2–35.5)
MCV RBC AUTO: 90.2 FL (ref 81.4–97.8)
MYCOBACTERIUM SPEC CULT: NORMAL
PLATELET # BLD AUTO: 371 K/UL (ref 164–446)
PMV BLD AUTO: 9.5 FL (ref 9–12.9)
POTASSIUM SERPL-SCNC: 3.8 MMOL/L (ref 3.6–5.5)
RBC # BLD AUTO: 4.51 M/UL (ref 4.2–5.4)
RHODAMINE-AURAMINE STN SPEC: NORMAL
RHODAMINE-AURAMINE STN SPEC: NORMAL
SIGNIFICANT IND 70042: NORMAL
SITE SITE: NORMAL
SODIUM SERPL-SCNC: 140 MMOL/L (ref 135–145)
SOURCE SOURCE: NORMAL
WBC # BLD AUTO: 8.9 K/UL (ref 4.8–10.8)

## 2024-02-25 PROCEDURE — 700102 HCHG RX REV CODE 250 W/ 637 OVERRIDE(OP): Performed by: STUDENT IN AN ORGANIZED HEALTH CARE EDUCATION/TRAINING PROGRAM

## 2024-02-25 PROCEDURE — 36415 COLL VENOUS BLD VENIPUNCTURE: CPT

## 2024-02-25 PROCEDURE — 85027 COMPLETE CBC AUTOMATED: CPT

## 2024-02-25 PROCEDURE — 700102 HCHG RX REV CODE 250 W/ 637 OVERRIDE(OP)

## 2024-02-25 PROCEDURE — 770020 HCHG ROOM/CARE - TELE (206)

## 2024-02-25 PROCEDURE — A9270 NON-COVERED ITEM OR SERVICE: HCPCS

## 2024-02-25 PROCEDURE — 99222 1ST HOSP IP/OBS MODERATE 55: CPT | Performed by: INTERNAL MEDICINE

## 2024-02-25 PROCEDURE — 700111 HCHG RX REV CODE 636 W/ 250 OVERRIDE (IP): Performed by: INTERNAL MEDICINE

## 2024-02-25 PROCEDURE — 99233 SBSQ HOSP IP/OBS HIGH 50: CPT | Performed by: INTERNAL MEDICINE

## 2024-02-25 PROCEDURE — A9270 NON-COVERED ITEM OR SERVICE: HCPCS | Performed by: STUDENT IN AN ORGANIZED HEALTH CARE EDUCATION/TRAINING PROGRAM

## 2024-02-25 PROCEDURE — 700111 HCHG RX REV CODE 636 W/ 250 OVERRIDE (IP): Mod: JZ | Performed by: STUDENT IN AN ORGANIZED HEALTH CARE EDUCATION/TRAINING PROGRAM

## 2024-02-25 PROCEDURE — 80048 BASIC METABOLIC PNL TOTAL CA: CPT

## 2024-02-25 PROCEDURE — 97530 THERAPEUTIC ACTIVITIES: CPT

## 2024-02-25 RX ORDER — LOSARTAN POTASSIUM 25 MG/1
12.5 TABLET ORAL
Status: DISCONTINUED | OUTPATIENT
Start: 2024-02-25 | End: 2024-02-26 | Stop reason: HOSPADM

## 2024-02-25 RX ORDER — SPIRONOLACTONE 25 MG/1
12.5 TABLET ORAL
Status: DISCONTINUED | OUTPATIENT
Start: 2024-02-25 | End: 2024-02-26 | Stop reason: HOSPADM

## 2024-02-25 RX ADMIN — ENOXAPARIN SODIUM 40 MG: 100 INJECTION SUBCUTANEOUS at 17:27

## 2024-02-25 RX ADMIN — LOSARTAN POTASSIUM 12.5 MG: 25 TABLET, FILM COATED ORAL at 17:28

## 2024-02-25 RX ADMIN — ASPIRIN 81 MG: 81 TABLET, COATED ORAL at 06:31

## 2024-02-25 RX ADMIN — SPIRONOLACTONE 12.5 MG: 25 TABLET ORAL at 17:28

## 2024-02-25 RX ADMIN — ATORVASTATIN CALCIUM 20 MG: 20 TABLET, FILM COATED ORAL at 17:27

## 2024-02-25 RX ADMIN — FUROSEMIDE 20 MG: 10 INJECTION INTRAMUSCULAR; INTRAVENOUS at 06:30

## 2024-02-25 ASSESSMENT — ENCOUNTER SYMPTOMS
DEPRESSION: 0
BRUISES/BLEEDS EASILY: 0
DIZZINESS: 0
COUGH: 1
FEVER: 1
MYALGIAS: 0
HEARTBURN: 0
ABDOMINAL PAIN: 0
SHORTNESS OF BREATH: 1
BLURRED VISION: 0
SHORTNESS OF BREATH: 0
NAUSEA: 0
PALPITATIONS: 0
HEMOPTYSIS: 0
DOUBLE VISION: 0
HEADACHES: 0
VOMITING: 0
CHILLS: 1
WEAKNESS: 1
SPUTUM PRODUCTION: 0
WHEEZING: 1
ORTHOPNEA: 1

## 2024-02-25 ASSESSMENT — PAIN DESCRIPTION - PAIN TYPE: TYPE: ACUTE PAIN

## 2024-02-25 ASSESSMENT — COGNITIVE AND FUNCTIONAL STATUS - GENERAL
SUGGESTED CMS G CODE MODIFIER MOBILITY: CL
MOBILITY SCORE: 12
TURNING FROM BACK TO SIDE WHILE IN FLAT BAD: A LOT
MOVING FROM LYING ON BACK TO SITTING ON SIDE OF FLAT BED: UNABLE
STANDING UP FROM CHAIR USING ARMS: A LITTLE
WALKING IN HOSPITAL ROOM: A LITTLE
CLIMB 3 TO 5 STEPS WITH RAILING: A LOT
MOVING TO AND FROM BED TO CHAIR: UNABLE

## 2024-02-25 ASSESSMENT — GAIT ASSESSMENTS
GAIT LEVEL OF ASSIST: MINIMAL ASSIST
DISTANCE (FEET): 3
DEVIATION: BRADYKINETIC;SHUFFLED GAIT
ASSISTIVE DEVICE: FRONT WHEEL WALKER

## 2024-02-25 ASSESSMENT — FIBROSIS 4 INDEX: FIB4 SCORE: 1.51

## 2024-02-25 ASSESSMENT — LIFESTYLE VARIABLES: SUBSTANCE_ABUSE: 0

## 2024-02-25 NOTE — DISCHARGE PLANNING
"RNCM met with patient bedside. She lives at [facility] assisted side in [place]. Per patient she does not use 02 at home baseline. Her  lives with her at the facility. She has a good friend that helps her as well- Neda Finley. It is unclear at this time if patient requires SNF prior to discharge home. Per P/T- at this point recommend SNF however, if improves may d/c home with HH. Plan still evolving.    15:58- RNCM spoke with resident nurse at [facility] # [phone]. They can provide needed assistance for patient. They also have a P/T department that can work with patient daily and O/T onsite. They have plenty of staff that can assist with ADL's and they have a \"call button\" that can be worn by patient if immediate assistance is needed. Resident nurse also stated that they will be providing extra help as patient was assisting 89 yo spouse that will need care. RNCM has faxed required documents to resident nurse at FAX # [phone]. Head nurse will review in AM. Please call [facility] at #[phone] once patient is ready to be discharged and Head RN has reviewed resident.  "

## 2024-02-25 NOTE — PROGRESS NOTES
Hospital Medicine Daily Progress Note    Date of Service  2/25/2024    Chief Complaint  Lizz Saldana is a 89 y.o. female admitted 2/22/2024 with leg swelling     Hospital Course  This is a  89 y.o. fairly independent elderly female with history of hypertension who presented 2/22/2024 with evaluation for increased lower extremity swelling, cough.  Patient reported not feeling well for the past several days.  She endorsed subjective fever, chills, cough associated shortness of breath and increased lower extremity swelling.  In ER, no leukocytosis, noted to have elevated troponin T and proBNP.  CTA chest did not show PE but noted bilateral pleural effusion.  Lower extremity Doppler did not show DVT.  She tested negative for COVID/influenza/RSV   Patient admitted for further work up and treatment     Interval Problem Update  Patient seen and examined, denies any chest pain, no nausea or vomiting lowere extremity swelling cont on IV lasix. Also bilateral pleural effusion and thoracentesis has been ordered   Echo pending  Close monitoring on tele   2/24: Patient seen and examined ha t=right thoracentesis today . Cont on IV lasix   Wean off O2 as tolerated   Echo pending   2/25: Patient seen and examined, resting in bed, afebrile, now her echo showing ef of 30% I have consulted cardiology cont on IV lasix for diuresis   Appreciate rec,   I have discussed this patient's plan of care and discharge plan at IDT rounds today with Case Management, Nursing, Nursing leadership, and other members of the IDT team.    Consultants/Specialty  Cardiology     Code Status  DNAR/DNI    Disposition  Medically Cleared  I have placed the appropriate orders for post-discharge needs.    Review of Systems  Review of Systems   Constitutional:  Positive for chills, fever and malaise/fatigue.   HENT:  Positive for hearing loss. Negative for tinnitus.    Eyes:  Negative for blurred vision and double vision.   Respiratory:  Positive for cough,  shortness of breath and wheezing. Negative for hemoptysis and sputum production.    Cardiovascular:  Positive for orthopnea and leg swelling. Negative for chest pain and palpitations.   Gastrointestinal:  Negative for abdominal pain, heartburn, nausea and vomiting.   Genitourinary:  Negative for dysuria and urgency.   Musculoskeletal:  Negative for joint pain and myalgias.   Skin:  Negative for itching and rash.   Neurological:  Positive for weakness. Negative for dizziness and headaches.   Endo/Heme/Allergies:  Negative for environmental allergies. Does not bruise/bleed easily.   Psychiatric/Behavioral:  Negative for depression and substance abuse.    All other systems reviewed and are negative.       Physical Exam  Temp:  [36 °C (96.8 °F)-36.7 °C (98.1 °F)] 36.7 °C (98.1 °F)  Pulse:  [83-93] 93  Resp:  [16-18] 16  BP: (101-113)/(57-63) 107/63  SpO2:  [85 %-93 %] 90 %    Physical Exam  Vitals and nursing note reviewed.   Constitutional:       General: She is not in acute distress.     Appearance: She is ill-appearing.   HENT:      Head: Normocephalic and atraumatic.      Ears:      Comments: Presbycusis     Nose: Nose normal.      Mouth/Throat:      Mouth: Mucous membranes are moist.      Pharynx: Oropharynx is clear.   Eyes:      General: No scleral icterus.  Cardiovascular:      Rate and Rhythm: Normal rate and regular rhythm.      Pulses: Normal pulses.      Heart sounds:      No friction rub.   Pulmonary:      Effort: No respiratory distress.      Breath sounds: Wheezing (minimal) and rales present.      Comments: Bibasilar crackles (R>L)  Chest:      Chest wall: No tenderness.   Abdominal:      General: There is no distension.      Tenderness: There is no abdominal tenderness. There is no guarding or rebound.   Musculoskeletal:         General: No tenderness.      Cervical back: Neck supple. No tenderness.      Right lower leg: Edema present.      Left lower leg: Edema present.   Skin:     General: Skin is  warm and dry.      Capillary Refill: Capillary refill takes less than 2 seconds.   Neurological:      General: No focal deficit present.      Mental Status: She is alert and oriented to person, place, and time.      Motor: Weakness (Generalized, nonfocal) present.   Psychiatric:         Mood and Affect: Mood normal.         Fluids    Intake/Output Summary (Last 24 hours) at 2/25/2024 1426  Last data filed at 2/25/2024 1103  Gross per 24 hour   Intake 240 ml   Output --   Net 240 ml       Laboratory  Recent Labs     02/23/24  0344 02/24/24  0453 02/25/24  0037   WBC 7.3 8.0 8.9   RBC 4.59 4.47 4.51   HEMOGLOBIN 14.1 13.6 13.8   HEMATOCRIT 41.3 40.2 40.7   MCV 90.0 89.9 90.2   MCH 30.7 30.4 30.6   MCHC 34.1 33.8 33.9   RDW 45.2 45.4 44.5   PLATELETCT 320 351 371   MPV 9.8 9.3 9.5     Recent Labs     02/23/24  0344 02/24/24  0453 02/25/24  0037   SODIUM 142 141 140   POTASSIUM 3.9 3.9 3.8   CHLORIDE 105 105 103   CO2 24 24 24   GLUCOSE 98 100* 126*   BUN 19 19 26*   CREATININE 0.47* 0.47* 0.60   CALCIUM 8.2* 8.4* 8.3*     Recent Labs     02/23/24  0745   INR 1.03               Imaging  EC-ECHOCARDIOGRAM COMPLETE W/O CONT   Final Result      US-THORACENTESIS PUNCTURE RIGHT   Final Result      1. Ultrasound guided right sided diagnostic and therapeutic thoracentesis.      2. 525 mL of fluid withdrawn.      DX-CHEST-PORTABLE (1 VIEW)   Final Result      No pneumothorax status post right thoracentesis.      CT-CTA CHEST PULMONARY ARTERY W/ RECONS   Final Result      1.  No evidence of pulmonary embolus.      2.  Bibasilar atelectasis and bilateral pleural effusions.      3.  Bilateral, left greater the right ill-defined groundglass and interstitial infiltrates which may represent presence of atypical infection versus interstitial edema or chronic interstitial lung disease.      4.  Borderline enlarged mediastinal lymph nodes.      5.  Atherosclerotic vascular calcification.      US-EXTREMITY VENOUS LOWER BILAT   Final  Result      DX-CHEST-PORTABLE (1 VIEW)   Final Result      Small bilateral pleural effusions with compressive atelectasis and/or consolidation.      Some mild patchy left mid lung opacity could be asymmetric edema and/or infection.           Assessment/Plan  * Volume overload- (present on admission)  Assessment & Plan  Diuresis as tolerated    Age-related physical debility  Assessment & Plan  PT/OT    Acute respiratory failure with hypoxia (HCC)  Assessment & Plan  On 2 L-wean off as tolerated  Diuresis  Nebs, pulmonary hygiene  Tested negative for COVID/influenza/RSV  Ct chest showing pleural effusion   Had thoracentesis     Bilateral leg edema  Assessment & Plan  Diuresis  No DVT seen on lower extremity Doppler    Elevated troponin  Assessment & Plan  Probable demand ischemia, volume overload  ASA/statin  Check echo    CHF (congestive heart failure) (HCC)  Assessment & Plan  Echo now showing EF of 30%   Cont on iV lasix  I have also consulted cardiology today appreciate rec.     ACP (advance care planning)- (present on admission)  Assessment & Plan  Goal care discussed with patient in T7.  She stated she does not wish for aggressive/heroic life-sustaining measures-no CPR/defibrillation/intubation or mechanical ventilation.  She is however agreeable any other noninvasive medical management as clinically warranted.  Diagnosis, prognosis, questions or concern addressed.  DNR/denies status confirmed.  ACP: 16 minutes    HTN (hypertension), benign- (present on admission)  Assessment & Plan  Cont on IV lasix   Monitor and adjust medication as needed     HX: breast cancer- (present on admission)  Assessment & Plan  Prior history   outpatient follow-up    Dyslipidemia- (present on admission)  Assessment & Plan  Statin         VTE prophylaxis: heparin ppx     Greater than 51 minutes spent prepping to see patient (e.g. review of tests) obtaining and/or reviewing separately obtained history. Performing a medically  appropriate examination and/ evaluation.  Counseling and educating the patient/family/caregiver.  Ordering medications, tests, or procedures.  Referring and communicating with other health care professionals.  Documenting clinical information in EPIC.  Independently interpreting results and communicating results to patient/family/caregiver.  Care coordination.    I have performed a physical exam and reviewed and updated ROS and Plan today (2/25/2024). In review of yesterday's note (2/24/2024), there are no changes except as documented above.

## 2024-02-25 NOTE — THERAPY
"Physical Therapy   Daily Treatment     Patient Name: Lizz Saldana  Age:  89 y.o., Sex:  female  Medical Record #: [id number]  Today's Date: 2/25/2024     Precautions  Precautions: Fall Risk    Assessment    PT paged to see pt for pending d/c. Pt still requiring Shannon for bed mobility, transfers, and unable to ambulate in room due to fatigue. Per pt, she does not receive assist for any mobility at [facility], and usually helps her . Conflicting documentation in chart stating whether pt is in Independent living or assisted living side of [facility]. Recommend placement unless pt is able to receive assist with all mobility. Pt is currently below her functional baseline and at risk for falls and re-admission. Will continue to follow to optimize pt's mobility and reach functional goals.     Plan    Treatment Plan Status: Continue Current Treatment Plan  Type of Treatment: Bed Mobility, Equipment, Gait Training, Neuro Re-Education / Balance, Self Care / Home Evaluation, Therapeutic Activities, Therapeutic Exercise  Treatment Frequency: 4 Times per Week  Treatment Duration: Until Therapy Goals Met    DC Equipment Recommendations: Unable to determine at this time  Discharge Recommendations: Recommend post-acute placement for additional physical therapy services prior to discharge home (Unless pt has Shannon available for bed mobility, transfers, and short distance ambulation, and Would likely require manual w/c or transport chair for household and community distances as pt unable to ambulate at this time. Otherwise recommend placement)      Subjective    \"My  is 89 years old.\"      Objective     02/25/24 1402   Vitals   O2 (LPM) 0.5   O2 Delivery Device Silicone Nasal Cannula   Pain 0 - 10 Group   Therapist Pain Assessment Post Activity Pain Same as Prior to Activity;Nurse Notified;0   Active ROM Lower Body    Active ROM Lower Body  WDL   Strength Lower Body   Comments BLE grossly 4/5   Balance   Sitting " Balance (Static) Fair   Sitting Balance (Dynamic) Fair   Standing Balance (Static) Fair -   Standing Balance (Dynamic) Fair -   Weight Shift Sitting Fair   Weight Shift Standing Fair   Skilled Intervention Verbal Cuing;Sequencing;Compensatory Strategies   Comments w/ FWW   Bed Mobility    Supine to Sit Minimal Assist   Scooting Standby Assist   Skilled Intervention Verbal Cuing;Tactile Cuing;Sequencing   Comments use of bed features. Pt attempting to put her socks on in long sitting, pt falling over to the side despite multiple attempts to steady self. Requring Shannon to correct balance   Gait Analysis   Gait Level Of Assist Minimal Assist   Assistive Device Front Wheel Walker   Distance (Feet) 3  (steps to BSC and chair)   # of Times Distance was Traveled 2   Deviation Bradykinetic;Shuffled Gait  (anterior trunk lean, decreased padmini, step length)   Skilled Intervention Verbal Cuing;Tactile Cuing;Compensatory Strategies;Sequencing   Functional Mobility   Sit to Stand Minimal Assist  (stabilizing FWW)   Bed, Chair, Wheelchair Transfer Minimal Assist   Toilet Transfers Minimal Assist   Transfer Method Stand Step   Mobility w/ FWW bed to BSC then to chair   Skilled Intervention Verbal Cuing;Tactile Cuing;Sequencing;Compensatory Strategies   Comments fatigue with transfers limiting further ambulation   How much difficulty does the patient currently have...   Turning over in bed (including adjusting bedclothes, sheets and blankets)? 2   Sitting down on and standing up from a chair with arms (e.g., wheelchair, bedside commode, etc.) 1   Moving from lying on back to sitting on the side of the bed? 1   How much help from another person does the patient currently need...   Moving to and from a bed to a chair (including a wheelchair)? 3   Need to walk in a hospital room? 3   Climbing 3-5 steps with a railing? 2   6 clicks Mobility Score 12   Activity Tolerance   Sitting in Chair up post   Comments limited by fatigue   Short  Term Goals    Short Term Goal # 1 pt will perform supine <> sit without bed features and SPV in 6 visits   Goal Outcome # 1 goal not met   Short Term Goal # 2 pt will perform sit <> stand and functional transfers with LRAD and SPV to improve mobility independence in 6 visits   Goal Outcome # 2 Goal not met   Short Term Goal # 3 pt will ambulate 200 ft with LRAD and SPV to access dining room or community needs in 6 visits   Goal Outcome # 3 Goal not met   Physical Therapy Treatment Plan   Physical Therapy Treatment Plan Continue Current Treatment Plan   Anticipated Discharge Equipment and Recommendations   DC Equipment Recommendations Unable to determine at this time   Discharge Recommendations Recommend post-acute placement for additional physical therapy services prior to discharge home  (Unless pt has Shannon available for bed mobility, transfers, and short distance ambulation, and Would likely require manual w/c or transport chair for household and community distances as pt unable to ambulate at this time. Otherwise recommend placement)   Interdisciplinary Plan of Care Collaboration   IDT Collaboration with  Nursing;Occupational Therapist   Patient Position at End of Therapy Seated;Chair Alarm On;Call Light within Reach;Tray Table within Reach;Phone within Reach   Collaboration Comments RN updated   Session Information   Date / Session Number  2/25- 2 (2/4, 2/29)

## 2024-02-25 NOTE — THERAPY
Occupational Therapy Contact Note    Patient Name: Lizz Saldana  Age:  89 y.o., Sex:  female  Medical Record #: [id number]  Today's Date: 2/25/2024    OT requested by Charge RN for possible DC and tx attempted. Pt politely declined due to recent activity with staff. Will re-attempt OT tx when appropriate/able.

## 2024-02-25 NOTE — CARE PLAN
The patient is Stable - Low risk of patient condition declining or worsening    Shift Goals  Clinical Goals: diuresis, echo  Patient Goals: rest  Family Goals: updates    Progress made toward(s) clinical / shift goals:      Patient is not progressing towards the following goals:      Problem: Care Map:  Admission Optimal Outcome for the Heart Failure Patient  Goal: Admission:  Optimal Care of the heart failure patient  Outcome: Progressing     Problem: Care Map:  Day 1 Optimal Outcome for the Heart Failure Patient  Goal: Day 1:  Optimal Care of the heart failure patient  Outcome: Progressing     Problem: Care Map:  Day 2 Optimal Outcome for the Heart Failure Patient  Goal: Day 2:  Optimal Care of the heart failure patient  Outcome: Progressing     Problem: Care Map:  Day 3 Optimal Outcome for the Heart Failure Patient  Goal: Day 3:  Optimal Care of the heart failure patient  Outcome: Progressing     Problem: Care Map:  Day Before Discharge Optimal Outcome for the Heart Failure Patient  Goal: Day Before Discharge:  Optimal Care of the heart failure patient  Outcome: Progressing     Problem: Care Map:  Day of Discharge Optimal Outcome for the Heart Failure Patient  Goal: Day of Discharge:  Optimal Care of the heart failure patient  Outcome: Progressing     Problem: Knowledge Deficit - Standard  Goal: Patient and family/care givers will demonstrate understanding of plan of care, disease process/condition, diagnostic tests and medications  Outcome: Progressing     Problem: Hemodynamics  Goal: Patient's hemodynamics, fluid balance and neurologic status will be stable or improve  Outcome: Progressing     Problem: Fluid Volume  Goal: Fluid volume balance will be maintained  Outcome: Progressing     Problem: Urinary - Renal Perfusion  Goal: Ability to achieve and maintain adequate renal perfusion and functioning will improve  Outcome: Progressing     Problem: Respiratory  Goal: Patient will achieve/maintain optimum  respiratory ventilation and gas exchange  Outcome: Progressing     Problem: Mechanical Ventilation  Goal: Safe management of artificial airway and ventilation  Outcome: Progressing  Goal: Successful weaning off mechanical ventilator, spontaneously maintains adequate gas exchange  Outcome: Progressing  Goal: Patient will be able to express needs and understand communication  Outcome: Progressing     Problem: Physical Regulation  Goal: Diagnostic test results will improve  Outcome: Progressing  Goal: Signs and symptoms of infection will decrease  Outcome: Progressing     Problem: Fall Risk  Goal: Patient will remain free from falls  Outcome: Progressing

## 2024-02-25 NOTE — CARE PLAN
The patient is Stable - Low risk of patient condition declining or worsening    Shift Goals  Clinical Goals: diuresis, monitor labs, rest  Patient Goals: sleep  Family Goals: NA    Progress made toward(s) clinical / shift goals:      Problem: Care Map:  Admission Optimal Outcome for the Heart Failure Patient  Goal: Admission:  Optimal Care of the heart failure patient  Outcome: Progressing     Problem: Care Map:  Day 2 Optimal Outcome for the Heart Failure Patient  Goal: Day 2:  Optimal Care of the heart failure patient  Outcome: Progressing     Problem: Knowledge Deficit - Standard  Goal: Patient and family/care givers will demonstrate understanding of plan of care, disease process/condition, diagnostic tests and medications  Outcome: Progressing     Problem: Fall Risk  Goal: Patient will remain free from falls  Outcome: Progressing       Patient is not progressing towards the following goals:

## 2024-02-25 NOTE — CONSULTS
Cardiology Initial Consultation    Date of Service  2/25/2024    Referring Physician  Patricia Rodriguez M.D.    Reason for Consultation  Acute systolic congestive heart failure.    History of Presenting Illness  Lizz Saldana is a 89 y.o. female with a past medical history of hypertension and hyperlipidemia, lifelong nonsmoker, no family history of coronary artery disease who presented 2/22/2024 with lower extremity edema. She underwent an echocardiogram which revealed reduced left ventricular systolic function. She is clinically improving with diuresis.     Review of Systems  Review of Systems   Unable to perform ROS: Other   Respiratory:  Negative for shortness of breath.    Cardiovascular:  Negative for chest pain.       Past Medical History   has a past medical history of Arthritis (2015), Bilateral cataracts, Cancer (HCC) (1990), Closed fracture of distal end of right radius (10/24/2022), DJD (degenerative joint disease), Encounter to [facility] care (08/29/2023), High cholesterol, HTN, breast cancer, Hyperlipidemia, Osteoporosis, Pain (07/2015), Pigmented skin lesion (04/30/2015), Postherpetic neuralgia, and Tubular adenoma.    Surgical History   has a past surgical history that includes rotator cuff repair (1990); cataract extraction with iol (2000); cyst excision (Left, 1989); mastectomy (1990); hysterectomy, total abdominal (1970); knee arthroplasty total (Right, 7/13/2015); and knee manipulation (Right, 10/12/2015).    Family History  family history includes Hypertension in her mother; Other in her father and sister.    Social History   reports that she has never smoked. She has never used smokeless tobacco. She reports that she does not drink alcohol and does not use drugs.    Medications  Prior to Admission Medications   Prescriptions Last Dose Informant Patient Reported? Taking?   felodipine ER (PLENDIL) 10 MG TABLET SR 24 HR 2/22/2024 at AM  No Yes   Sig: TAKE 1 TABLET DAILY   Patient taking differently:  Take 10 mg by mouth every day.      Facility-Administered Medications: None       Allergies  Allergies   Allergen Reactions    Codeine Nausea    Floxin [Ofloxacin] Itching and Swelling    Lidoderm      rash    Methylprednisolone Sodium Succ Rash and Itching     Painful skin blistering LE    Toradol Unspecified     .       Vital signs in last 24 hours  Temp:  [36 °C (96.8 °F)-36.7 °C (98.1 °F)] 36.7 °C (98.1 °F)  Pulse:  [83-93] 93  Resp:  [16-18] 16  BP: (101-113)/(57-63) 107/63  SpO2:  [85 %-93 %] 90 %    Physical Exam  Physical Exam  Constitutional:       Appearance: Normal appearance. She is well-developed and underweight.   HENT:      Head: Normocephalic and atraumatic.      Mouth/Throat:      Mouth: Mucous membranes are moist.   Eyes:      Extraocular Movements: Extraocular movements intact.      Conjunctiva/sclera: Conjunctivae normal.   Cardiovascular:      Rate and Rhythm: Normal rate and regular rhythm.      Pulses: Normal pulses.      Heart sounds: Normal heart sounds.   Pulmonary:      Effort: Pulmonary effort is normal.      Breath sounds: Normal breath sounds.   Abdominal:      General: Bowel sounds are normal.      Palpations: Abdomen is soft.   Musculoskeletal:         General: Normal range of motion.      Cervical back: Normal range of motion and neck supple.   Skin:     General: Skin is warm and dry.   Neurological:      General: No focal deficit present.      Mental Status: She is alert and oriented to person, place, and time. Mental status is at baseline.   Psychiatric:         Mood and Affect: Mood normal.         Behavior: Behavior normal.         Thought Content: Thought content normal.         Judgment: Judgment normal.         Lab Review  Lab Results   Component Value Date/Time    WBC 8.9 02/25/2024 12:37 AM    WBC 6.8 02/17/2010 10:19 AM    RBC 4.51 02/25/2024 12:37 AM    RBC 4.82 02/17/2010 10:19 AM    HEMOGLOBIN 13.8 02/25/2024 12:37 AM    HEMATOCRIT 40.7 02/25/2024 12:37 AM    MCV 90.2  02/25/2024 12:37 AM    MCV 90 02/17/2010 10:19 AM    MCH 30.6 02/25/2024 12:37 AM    MCH 30.5 02/17/2010 10:19 AM    MCHC 33.9 02/25/2024 12:37 AM    MPV 9.5 02/25/2024 12:37 AM      Lab Results   Component Value Date/Time    SODIUM 140 02/25/2024 12:37 AM    POTASSIUM 3.8 02/25/2024 12:37 AM    CHLORIDE 103 02/25/2024 12:37 AM    CO2 24 02/25/2024 12:37 AM    GLUCOSE 126 (H) 02/25/2024 12:37 AM    BUN 26 (H) 02/25/2024 12:37 AM    CREATININE 0.60 02/25/2024 12:37 AM    CREATININE 0.81 02/17/2010 10:19 AM    BUNCREATRAT 23 02/17/2010 10:19 AM    GLOMRATE >59 02/17/2010 10:19 AM      Lab Results   Component Value Date/Time    ASTSGOT 43 02/23/2024 03:44 AM    ALTSGPT 54 (H) 02/23/2024 03:44 AM     Lab Results   Component Value Date/Time    CHOLSTRLTOT 237 (H) 08/30/2023 09:34 AM     (H) 08/30/2023 09:34 AM    HDL 77 08/30/2023 09:34 AM    TRIGLYCERIDE 93 08/30/2023 09:34 AM    TROPONINT 25 (H) 02/22/2024 07:28 PM       Recent Labs     02/22/24  1837   NTPROBNP 1856*       Cardiac Imaging and Procedures Review  CARDIAC STUDIES/PROCEDURES:    CTA OF CHEST (02/22/24)  1.  No evidence of pulmonary embolus.  2.  Bibasilar atelectasis and bilateral pleural effusions.  3.  Bilateral, left greater the right ill-defined groundglass and interstitial infiltrates which may represent presence of atypical infection versus interstitial edema or chronic interstitial lung disease.  4.  Borderline enlarged mediastinal lymph nodes.  5.  Atherosclerotic vascular calcification.  (study result reviewed)     ECHOCARDIOGRAM CONCLUSIONS (02/24/24)  Mildly dilated LA, otherwise normal chamber sizes. Moderate to severely   depressed LV systolic function. LVEF 25-30% visually. Global   hyokinesis. Dilated IVC with <50% inspiratory collapse consistent with   elevated central venous pressures. No signfiicant valvular disesase. No   prior study is available for comparison.   (study result reviewed)     EKG performed on (02/24/24) was  reviewed: EKG personally interpreted shows sinus rhythm with left bundle branch block.     Assessment/Plan  Acute systolic congestive heart failure: She is a 89 y.o. female with a past medical history of hypertension and hyperlipidemia, lifelong nonsmoker, no family history of coronary artery disease who presented with lower extremity edema. She underwent an echocardiogram which revealed reduced left ventricular systolic function. She is clinically improving with diuresis. We will continue with current medical therapy as allowed by her soft blood pressure.      Thank you for allowing me to participate in the care of this patient.    I will continue to follow this patient    Please contact me with any questions.    Joe Frias M.D.   Cardiologist, [facility]  [phone]

## 2024-02-26 ENCOUNTER — PHARMACY VISIT (OUTPATIENT)
Dept: PHARMACY | Facility: MEDICAL CENTER | Age: 89
End: 2024-02-26
Payer: COMMERCIAL

## 2024-02-26 VITALS
HEART RATE: 99 BPM | SYSTOLIC BLOOD PRESSURE: 113 MMHG | RESPIRATION RATE: 17 BRPM | BODY MASS INDEX: 19.81 KG/M2 | TEMPERATURE: 97.5 F | DIASTOLIC BLOOD PRESSURE: 54 MMHG | OXYGEN SATURATION: 90 % | WEIGHT: 101.41 LBS

## 2024-02-26 LAB
ANION GAP SERPL CALC-SCNC: 9 MMOL/L (ref 7–16)
BUN SERPL-MCNC: 21 MG/DL (ref 8–22)
CALCIUM SERPL-MCNC: 8.4 MG/DL (ref 8.5–10.5)
CHLORIDE SERPL-SCNC: 101 MMOL/L (ref 96–112)
CO2 SERPL-SCNC: 28 MMOL/L (ref 20–33)
CREAT SERPL-MCNC: 0.55 MG/DL (ref 0.5–1.4)
ERYTHROCYTE [DISTWIDTH] IN BLOOD BY AUTOMATED COUNT: 43.6 FL (ref 35.9–50)
GFR SERPLBLD CREATININE-BSD FMLA CKD-EPI: 84 ML/MIN/1.73 M 2
GLUCOSE SERPL-MCNC: 105 MG/DL (ref 65–99)
HCT VFR BLD AUTO: 43.6 % (ref 37–47)
HGB BLD-MCNC: 14.6 G/DL (ref 12–16)
MCH RBC QN AUTO: 30.3 PG (ref 27–33)
MCHC RBC AUTO-ENTMCNC: 33.5 G/DL (ref 32.2–35.5)
MCV RBC AUTO: 90.5 FL (ref 81.4–97.8)
PLATELET # BLD AUTO: 310 K/UL (ref 164–446)
PMV BLD AUTO: 9.1 FL (ref 9–12.9)
POTASSIUM SERPL-SCNC: 3.9 MMOL/L (ref 3.6–5.5)
RBC # BLD AUTO: 4.82 M/UL (ref 4.2–5.4)
SODIUM SERPL-SCNC: 138 MMOL/L (ref 135–145)
WBC # BLD AUTO: 10.1 K/UL (ref 4.8–10.8)

## 2024-02-26 PROCEDURE — 99232 SBSQ HOSP IP/OBS MODERATE 35: CPT

## 2024-02-26 PROCEDURE — 700111 HCHG RX REV CODE 636 W/ 250 OVERRIDE (IP): Mod: JZ | Performed by: STUDENT IN AN ORGANIZED HEALTH CARE EDUCATION/TRAINING PROGRAM

## 2024-02-26 PROCEDURE — A9270 NON-COVERED ITEM OR SERVICE: HCPCS

## 2024-02-26 PROCEDURE — 700102 HCHG RX REV CODE 250 W/ 637 OVERRIDE(OP): Performed by: STUDENT IN AN ORGANIZED HEALTH CARE EDUCATION/TRAINING PROGRAM

## 2024-02-26 PROCEDURE — 700102 HCHG RX REV CODE 250 W/ 637 OVERRIDE(OP)

## 2024-02-26 PROCEDURE — 80048 BASIC METABOLIC PNL TOTAL CA: CPT

## 2024-02-26 PROCEDURE — 99239 HOSP IP/OBS DSCHRG MGMT >30: CPT | Performed by: INTERNAL MEDICINE

## 2024-02-26 PROCEDURE — 85027 COMPLETE CBC AUTOMATED: CPT

## 2024-02-26 PROCEDURE — RXMED WILLOW AMBULATORY MEDICATION CHARGE: Performed by: INTERNAL MEDICINE

## 2024-02-26 PROCEDURE — 51798 US URINE CAPACITY MEASURE: CPT

## 2024-02-26 PROCEDURE — A9270 NON-COVERED ITEM OR SERVICE: HCPCS | Performed by: STUDENT IN AN ORGANIZED HEALTH CARE EDUCATION/TRAINING PROGRAM

## 2024-02-26 RX ORDER — FUROSEMIDE 40 MG/1
40 TABLET ORAL
Status: DISCONTINUED | OUTPATIENT
Start: 2024-02-26 | End: 2024-02-26 | Stop reason: HOSPADM

## 2024-02-26 RX ORDER — ATORVASTATIN CALCIUM 20 MG/1
20 TABLET, FILM COATED ORAL EVERY EVENING
Qty: 30 TABLET | Refills: 0 | Status: SHIPPED | OUTPATIENT
Start: 2024-02-26 | End: 2024-03-05

## 2024-02-26 RX ORDER — LOSARTAN POTASSIUM 25 MG/1
12.5 TABLET ORAL DAILY
Qty: 30 TABLET | Refills: 0 | Status: SHIPPED | OUTPATIENT
Start: 2024-02-27 | End: 2024-03-05 | Stop reason: SDUPTHER

## 2024-02-26 RX ORDER — FUROSEMIDE 40 MG/1
40 TABLET ORAL DAILY
Qty: 30 TABLET | Refills: 0 | Status: SHIPPED | OUTPATIENT
Start: 2024-02-27 | End: 2024-03-05 | Stop reason: SDUPTHER

## 2024-02-26 RX ORDER — ASPIRIN 81 MG/1
81 TABLET ORAL DAILY
Qty: 100 TABLET | Refills: 0 | Status: SHIPPED | OUTPATIENT
Start: 2024-02-27 | End: 2024-03-05

## 2024-02-26 RX ORDER — SPIRONOLACTONE 25 MG/1
12.5 TABLET ORAL DAILY
Qty: 30 TABLET | Refills: 3 | Status: SHIPPED | OUTPATIENT
Start: 2024-02-27 | End: 2024-03-05

## 2024-02-26 RX ADMIN — ENOXAPARIN SODIUM 40 MG: 100 INJECTION SUBCUTANEOUS at 16:17

## 2024-02-26 RX ADMIN — ATORVASTATIN CALCIUM 20 MG: 20 TABLET, FILM COATED ORAL at 16:17

## 2024-02-26 RX ADMIN — FUROSEMIDE 40 MG: 40 TABLET ORAL at 12:03

## 2024-02-26 RX ADMIN — SPIRONOLACTONE 12.5 MG: 25 TABLET ORAL at 06:06

## 2024-02-26 RX ADMIN — ASPIRIN 81 MG: 81 TABLET, COATED ORAL at 06:06

## 2024-02-26 RX ADMIN — LOSARTAN POTASSIUM 12.5 MG: 25 TABLET, FILM COATED ORAL at 06:07

## 2024-02-26 ASSESSMENT — PAIN DESCRIPTION - PAIN TYPE: TYPE: ACUTE PAIN

## 2024-02-26 ASSESSMENT — ENCOUNTER SYMPTOMS
SYNCOPE: 0
SHORTNESS OF BREATH: 1
CLAUDICATION: 0
WHEEZING: 0
PALPITATIONS: 0
ORTHOPNEA: 1
ABDOMINAL PAIN: 0
PSYCHIATRIC NEGATIVE: 1
COUGH: 1

## 2024-02-26 ASSESSMENT — FIBROSIS 4 INDEX: FIB4 SCORE: 1.81

## 2024-02-26 NOTE — DISCHARGE PLANNING
1231  Received Choice form at 1146  Agency/Facility Name: [facility]   Referral sent per Choice form @ 1231     1438  Agency/Facility Name: [facility]    Spoke To: Christiana  Outcome: DPA called to f/u with order, per Christiana order is being processed. Christiana to call DPA back with an ETA of delivery. Christiana also asking pt's room number at the assisted living.     1441  Agency/Facility Name: [facility]   Spoke To: Shanna   Outcome: DPA called to notify that pt stays in room 106 at the assisted living.

## 2024-02-26 NOTE — DISCHARGE INSTRUCTIONS
HF Patient Discharge Instructions  Monitor your weight daily, and maintain a weight chart, to track your weight changes.   Activity as tolerated, unless your Doctor has ordered otherwise..  Follow a low fat, low cholesterol, low salt diet unless instructed otherwise by your Doctor. Read the labels on the back of food products and track your intake of fat, cholesterol and salt.   Fluid Restriction No. If a Fluid Restriction has been ordered by your Doctor, measure fluids with a measuring cup to ensure that you are not exceeding the restriction.   No smoking.  Oxygen Yes. If your Doctor has ordered that you wear Oxygen at home, it is important to wear it as ordered.  Did you receive an explanation from staff on the importance of taking each of your medications and why it is necessary to keep taking them unless your doctor says to stop? Yes  Were all of your questions answered about how to manage your heart failure and what to do if you have increased signs and symptoms after you go home? Yes  Do you feel like your heart failure care team involved you in the care treatment plan and allowed you to make decisions regarding your care while in the hospital and addressed any discharge needs you might have? Yes    See the educational handout provided at discharge for more information on monitoring your daily weight, activity and diet. This also explains more about Heart Failure, symptoms of a flare-up and some of the tests that you have undergone.     Warning Signs of a Flare-Up include:  Swelling in the ankles or lower legs.  Shortness of breath, while at rest, or while doing normal activities.   Shortness of breath at night when in bed, or coughing in bed.   Requiring more pillows to sleep at night, or needing to sit up at night to sleep.  Feeling weak, dizzy or fatigued.     When to call your Doctor:  Call your Primary Care Physician or Cardiologist if:   You experience any pain radiating to your jaw or neck.  You have  any difficulty breathing.  You experience weight gain of 3 lbs in a day or 5 lbs in a week.   You feel any palpitations or irregular heartbeats.  You become dizzy or lose consciousness.   If you have had an angiogram or had a pacemaker or AICD placed, and experience:  Bleeding, drainage or swelling at the surgical / puncture site.  Fever greater than 100.0 F  Shock from internal defibrillator.  Cool and / or numb extremities.     Please access the [facility] My HF Guide/Heart Failure Interactive Workbook:   [email or web address]

## 2024-02-26 NOTE — PROGRESS NOTES
Monitor Summary:     Rhythm: SR  Rate: 83-95  Ectopy: (O) PVC, (R) PVC, (O) PAC, (R) PAC  Measurements: 0.15/0.12/1.40           12 Hour Chart Check

## 2024-02-26 NOTE — CARE PLAN
The patient is Stable - Low risk of patient condition declining or worsening    Shift Goals  Clinical Goals: r/o DVT, diuresis  Patient Goals: comfort  Family Goals: NA    Progress made toward(s) clinical / shift goals:      Patient is not progressing towards the following goals:      Problem: Care Map:  Admission Optimal Outcome for the Heart Failure Patient  Goal: Admission:  Optimal Care of the heart failure patient  Outcome: Progressing     Problem: Care Map:  Day 1 Optimal Outcome for the Heart Failure Patient  Goal: Day 1:  Optimal Care of the heart failure patient  Outcome: Progressing     Problem: Care Map:  Day 2 Optimal Outcome for the Heart Failure Patient  Goal: Day 2:  Optimal Care of the heart failure patient  Outcome: Progressing     Problem: Care Map:  Day 3 Optimal Outcome for the Heart Failure Patient  Goal: Day 3:  Optimal Care of the heart failure patient  Outcome: Progressing     Problem: Care Map:  Day Before Discharge Optimal Outcome for the Heart Failure Patient  Goal: Day Before Discharge:  Optimal Care of the heart failure patient  Outcome: Progressing     Problem: Care Map:  Day of Discharge Optimal Outcome for the Heart Failure Patient  Goal: Day of Discharge:  Optimal Care of the heart failure patient  Outcome: Progressing     Problem: Knowledge Deficit - Standard  Goal: Patient and family/care givers will demonstrate understanding of plan of care, disease process/condition, diagnostic tests and medications  Outcome: Progressing     Problem: Hemodynamics  Goal: Patient's hemodynamics, fluid balance and neurologic status will be stable or improve  Outcome: Progressing     Problem: Fluid Volume  Goal: Fluid volume balance will be maintained  Outcome: Progressing     Problem: Urinary - Renal Perfusion  Goal: Ability to achieve and maintain adequate renal perfusion and functioning will improve  Outcome: Progressing     Problem: Respiratory  Goal: Patient will achieve/maintain optimum  respiratory ventilation and gas exchange  Outcome: Progressing     Problem: Mechanical Ventilation  Goal: Safe management of artificial airway and ventilation  Outcome: Progressing  Goal: Successful weaning off mechanical ventilator, spontaneously maintains adequate gas exchange  Outcome: Progressing  Goal: Patient will be able to express needs and understand communication  Outcome: Progressing     Problem: Physical Regulation  Goal: Diagnostic test results will improve  Outcome: Progressing  Goal: Signs and symptoms of infection will decrease  Outcome: Progressing     Problem: Fall Risk  Goal: Patient will remain free from falls  Outcome: Progressing

## 2024-02-26 NOTE — PROGRESS NOTES
Report received from day shift RN, assumed patient care. Patient is calmly resting in bed, no signs of distress, even and unlabored breathing noted. Pt on 1 LPM NC O2. A&Ox4. Tele box on and in place. Patient has call light within reach, fall precautions in place. Patient states no needs at this time. Hourly rounding initiated.

## 2024-02-26 NOTE — DISCHARGE PLANNING
"Case Management Discharge Planning    Admission Date: 2/22/2024  GMLOS: 3.9  ALOS: 4    6-Clicks ADL Score: 17  6-Clicks Mobility Score: 12  PT and/or OT Eval ordered: Yes  Post-acute Referrals Ordered: NA  Post-acute Choice Obtained: NA  Has referral(s) been sent to post-acute provider:  [facility]    Anticipated Discharge Dispo:  Home/Self    DME Needed: No    Action(s) Taken:    @ 0830: Discussed pt during morning rounds. Per MD, pt is anticipated to be medically cleared today. SW to f/u with pt's Head RN at [facility] to verify if they are willing to accept pt back at dc.    @ 0924: ANTHONY called [facility] to verify they are able to accept pt back. ANTHONY left a VM for Head RN. Will follow up at a later time.    @ 1041: ANTHONY reached out to [facility] regarding pt's DCP. Per Edith,  at [facility], wanted to know changes to pt's baseline. Edith stated she received most recent PT/OT notes yesterday and hasn't follow reviewed them. Per Edith, HH orders must be placed and faxed to them prior to pt's discharge; also wants O2 order faxed. Per Edith, scripts for meds would also need to be sent with pt. Per Edith, if pt is medically cleared, pt's POA Bri typically can provide transport. ANTHONY to call Edith with updates. [phone].    @ 1113: ANTHONY called pt's POA, Bri Finley, regarding pt's DCP. Bri gave permission for [employer] as pt's O2 choice, if pt were to need it. ANTHONY asked if Bri could provide transport for pt. Per Bri, she could.    @ 1403: ANTHONY spoke with Keny with [facility]. Per Keny, pt's O2 order is just beginning to be processed and will verify if pt's insurance and O2 needs qualify, sent it out 5 minutes ago. Per Keny, she will call this SW back in 30 minutes with a response.     @ 1542: ANTHONY called [facility] regarding ETA for pt. Per Shanna, the  \"is on his way and should be there any moment.\"    Escalations Completed: None    Medically Clear: Yes    Next Steps: Pending response from " [facility].    Barriers to Discharge: Transportation    Is the patient up for discharge tomorrow: No

## 2024-02-26 NOTE — CARE PLAN
The patient is Stable - Low risk of patient condition declining or worsening    Shift Goals  Clinical Goals: monitor hemodynamics  Patient Goals: sleep  Family Goals: NA    Progress made toward(s) clinical / shift goals:  Patient has been weaned to 0.5 LPM NC maintaining 90% and above. Fall precautions in place. Educated patient on safety.       Problem: Knowledge Deficit - Standard  Goal: Patient and family/care givers will demonstrate understanding of plan of care, disease process/condition, diagnostic tests and medications  Outcome: Progressing     Problem: Hemodynamics  Goal: Patient's hemodynamics, fluid balance and neurologic status will be stable or improve  Outcome: Progressing     Problem: Respiratory  Goal: Patient will achieve/maintain optimum respiratory ventilation and gas exchange  Outcome: Progressing     Problem: Fall Risk  Goal: Patient will remain free from falls  Outcome: Progressing

## 2024-02-26 NOTE — PROGRESS NOTES
Cardiology Follow-up Note    Name:   Lizz Saldana   YOB: 1927  Age:   89 y.o.  female   MRN:   [id number]        Attending Provider: Michael    Chief Complaint: Leg Swelling, Rash, and Cough       Reason for cardiology consult: Acute systolic congestive heart failure    History of Present Illness  Lizz Saldana is 89 y.o. female with prior medical history significant for hypertension, hyperlipidemia, breast cancer who was admitted on 2/22/2024 with progressive cough and lower extremity edema lasting about 2 weeks.  She reported associated chills, subjective fever, shortness of breath.  She was negative for PE on CTA chest, however noted bilateral pleural effusion.  Thoracentesis was performed.  Diuretics initiated.  Troponin level slightly elevated at 24, NT proBNP 1,856.  Echocardiogram was obtained, demonstrates severely decreased LV function, ejection fraction 25 to 30%.   Patient is never a smoker, no family history of coronary artery disease.     Interim Events:  - Personal Telemetry interpretation: Sinus rhythm with frequent PACs and PVCs  - Overnight events: No overnight cardiac events  - Vitals: Blood pressure ranges from 102-130s systolically  - Labs reviewed: Creatinine 0.5, potassium 3.9  - I/O's: Possibly not accurate documentation of urine output yesterday, none charted  - Weight: 101 pounds, 110 pounds on arrival.         Review of Systems   Constitutional: Positive for malaise/fatigue.   Cardiovascular:  Positive for leg swelling and orthopnea. Negative for chest pain, claudication, palpitations and syncope.   Respiratory:  Positive for cough and shortness of breath. Negative for wheezing.    Gastrointestinal:  Negative for abdominal pain.   Psychiatric/Behavioral: Negative.          Medical History  Past Medical History:   Diagnosis Date    Arthritis 2015    left knee    Bilateral cataracts     bilateral IOL    Cancer (HCC) 1990    Breast     Closed fracture of distal end of right  radius 10/24/2022    2021, was seen by orthopedic    DJD (degenerative joint disease)     Encounter to establish care 08/29/2023    High cholesterol     HTN     HX: breast cancer     Hyperlipidemia     Osteoporosis     Pain 07/2015    right knee    Pigmented skin lesion 04/30/2015    Postherpetic neuralgia     Tubular adenoma          Family History   Problem Relation Age of Onset    Hypertension Mother     Other Father     Other Sister         alzheimer    Cancer Neg Hx     Diabetes Neg Hx     Heart Disease Neg Hx     Hyperlipidemia Neg Hx     Stroke Neg Hx          Social History     Tobacco Use    Smoking status: Never    Smokeless tobacco: Never   Vaping Use    Vaping Use: Never used   Substance Use Topics    Alcohol use: No     Alcohol/week: 0.0 oz    Drug use: No         Allergies   Allergen Reactions    Codeine Nausea    Floxin [Ofloxacin] Itching and Swelling    Lidoderm      rash    Methylprednisolone Sodium Succ Rash and Itching     Painful skin blistering LE    Toradol Unspecified     .         Medications   Scheduled Medications   Medication Dose Frequency    furosemide  40 mg Q DAY    losartan  12.5 mg Q DAY    spironolactone  12.5 mg Q DAY    aspirin  81 mg DAILY    atorvastatin  20 mg Q EVENING    enoxaparin (LOVENOX) injection  40 mg DAILY AT 1800           Physical Exam    Body mass index is 19.81 kg/m².     /67   Pulse 99   Temp 36.5 °C (97.7 °F) (Temporal)   Resp 18   Wt 46 kg (101 lb 6.6 oz)   SpO2 90%      Vitals:    02/26/24 0526 02/26/24 0605 02/26/24 0728 02/26/24 1109   BP: 103/55 115/67 122/68 130/67   Pulse: 89 92 89 99   Resp: 16  17 18   Temp: 36.5 °C (97.7 °F)  36.5 °C (97.7 °F) 36.5 °C (97.7 °F)   TempSrc: Temporal  Temporal Temporal   SpO2: 91%  93% 90%   Weight: 46 kg (101 lb 6.6 oz)           Oxygen Therapy:  Pulse Oximetry: 90 %, O2 (LPM): 0, O2 Delivery Device: None - Room Air    BP Readings from Last 4 Encounters:   02/26/24 130/67   11/14/23 118/64   08/29/23 138/64  "  10/24/22 124/74       Wt Readings from Last 4 Encounters:   02/26/24 46 kg (101 lb 6.6 oz)   11/14/23 41.3 kg (91 lb)   08/29/23 41.3 kg (91 lb)   12/15/22 45.4 kg (100 lb)         Physical Exam  Constitutional:       Appearance: Normal appearance.   HENT:      Mouth/Throat:      Mouth: Mucous membranes are moist.   Neck:      Vascular: JVD present.   Cardiovascular:      Rate and Rhythm: Normal rate and regular rhythm. Extrasystoles are present.     Pulses: Normal pulses.      Heart sounds: Normal heart sounds. No murmur heard.     No friction rub. No gallop.   Pulmonary:      Effort: Pulmonary effort is normal.      Breath sounds: Rales present.   Abdominal:      General: There is no distension.      Palpations: Abdomen is soft.   Musculoskeletal:      Right lower leg: Edema present.      Left lower leg: Edema present.   Skin:     General: Skin is warm and dry.      Capillary Refill: Capillary refill takes 2 to 3 seconds.   Neurological:      Mental Status: She is alert and oriented to person, place, and time.   Psychiatric:         Mood and Affect: Mood normal.         Behavior: Behavior normal.         Judgment: Judgment normal.               Labs (personally reviewed):     CrCl cannot be calculated (Unknown ideal weight.).    No results found for: \"BNPBTYPENAT\"  Recent Labs     02/24/24 0453 02/25/24  0037 02/26/24  0212   CREATININE 0.47* 0.60 0.55   BUN 19 26* 21   POTASSIUM 3.9 3.8 3.9   SODIUM 141 140 138   CALCIUM 8.4* 8.3* 8.4*   CO2 24 24 28     Recent Labs     02/24/24 0453 02/25/24  0037 02/26/24  0212   GLUCOSE 100* 126* 105*     No results for input(s): \"ASTSGOT\", \"ALTSGPT\", \"ALKPHOSPHAT\", \"INR\" in the last 72 hours.    Invalid input(s): \"BILI\"  Recent Labs     02/24/24  0453 02/25/24  0037 02/26/24  0212   WBC 8.0 8.9 10.1   HEMOGLOBIN 13.6 13.8 14.6   PLATELETCT 351 371 310     No results for input(s): \"TROPONINT\", \"NTPROBNP\", \"HBA1C\" in the last 72 hours.  Lab Results   Component Value " Date/Time    CHOLSTRLTOT 237 (H) 08/30/2023 09:34 AM     (H) 08/30/2023 09:34 AM    HDL 77 08/30/2023 09:34 AM    TRIGLYCERIDE 93 08/30/2023 09:34 AM       Cardiac Imaging and Procedures Review      Telemetry Review  Sinus rhythm with left bundle branch block, occasional PVCs occasional PVCs  EKG   Sinus rhythm with left bundle branch block  Echo 2/24/2024:  Echocardiography Laboratory     CONCLUSIONS  Mildly dilated LA, otherwise normal chamber sizes. Moderate to severely   depressed LV systolic function. LVEF 25-30% visually. Global   hyokinesis. Dilated IVC with <50% inspiratory collapse consistent with   elevated central venous pressures. No signfiicant valvular disesase. No   prior study is available for comparison.       Principal Problem:    Volume overload (POA: Yes)  Active Problems:    Dyslipidemia (POA: Yes)      Overview: Managed with lovastatin 20 mg and gemfibrozil 600 mg daily.    HX: breast cancer (POA: Yes)    HTN (hypertension), benign (POA: Yes)      Overview: Managed with felodipine 5 mg daily.    ACP (advance care planning) (POA: Yes)    CHF (congestive heart failure) (HCC) (POA: Unknown)    Elevated troponin (POA: Unknown)    Bilateral leg edema (POA: Unknown)    Acute respiratory failure with hypoxia (HCC) (POA: Unknown)    Age-related physical debility (POA: Unknown)  Resolved Problems:    * No resolved hospital problems. 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  Remains on supplemental oxygen.  Reports mild shortness of breath and cough.  -Heart failure due to unknown etiology  -ACE-I/ARB/ARNI: Continue losartan 12.5 mg daily  -Evidence Based Beta-blocker: Hold off while diuresing . Consider metoprolol Toprol-XL 25 mg in the evening when euvolemic or in the outpatient setting  -Aldosterone  Antagonist: Continue spironolactone 12.5 mg daily  -Diuretic: Restart furosemide 40 mg p.o. daily  -SGLT2i: Consider outpatient  -Labs: BMP daily  -Daily weights; Strict I+O’s:   -Meds-to-beds and HF instructions on discharge   -FU in HF clinic per below     2.  Hyperlipidemia  -Continue atorvastatin 20 mg every evening    3.  Essential hypertension   Discontinue home felodipine.   -See GDMT above   Goal BP under 130/80         Future Appointments   Date Time Provider Department Center   3/4/2024  9:40 AM TREVA Carranza [place]   3/5/2024  1:45 PM TREVA Salcedo None       Discussed with Dr. Rodriguez  Please contact me with any questions.  Thank you for allowing us to participate in the care of this patient.    Please see Dr. Pina  attestation for further details and MDM.     I, TREVA Flores performed a portion of the service face-to-face with the same  patient on the same date of service INDEPENDENTLY OF Dr. Pina FOR 15 MINUTES. preparing to see the patient, reviewing hospital notes and tests, obtaining history from the patient, performing a medically appropriate exam, counseling and educating the patient, ordering medications/tests/procedures/referrals as clinically indicated, and documenting information in the electronic medical record.    Please note this dictation was created using voice recognition software.  I have made every reasonable attempt to correct obvious errors, but there may be errors of grammar and possibly content that I did not discover before finalizing the note.    TREVA Flores  [facility]  [phone]

## 2024-02-26 NOTE — FACE TO FACE
"Face to Face Note  -  Durable Medical Equipment    Patricia Rodriguez M.D. - NPI: [id number]  I certify that this patient is under my care and that they had a durable medical equipment(DME)face to face encounter by myself that meets the physician DME face-to-face encounter requirements with this patient on:    Date of encounter:   Patient:                    MRN:                       YOB: 2024  Lizz Saldana  [id number]  8/4/1927     The encounter with the patient was in whole, or in part, for the following medical condition, which is the primary reason for durable medical equipment:  CHF    I certify that, based on my findings, the following durable medical equipment is medically necessary:    Oxygen   HOME O2 Saturation Measurements:(Values must be present for Home Oxygen orders)  Room air sat at rest: 91  Room air sat with amb: 88 (Edge to bed pt unable to ambulate)  With liters of O2: 0.5, O2 sat at rest with O2: 93   ,    Is the patient mobile?: No  If patient feels more short of breath, they can go up to 6 liters per minute and contact healthcare provider.    Supporting Symptoms: The patient requires supplemental oxygen, as the following interventions have been tried with limited or no improvement: \"Ambulation with oximetry and \"Incentive spirometry.    My Clinical findings support the need for the above equipment due to:  Hypoxia  "

## 2024-02-27 ENCOUNTER — PATIENT OUTREACH (OUTPATIENT)
Dept: MEDICAL GROUP | Facility: PHYSICIAN GROUP | Age: 89
End: 2024-02-27
Payer: MEDICARE

## 2024-02-27 LAB
BACTERIA BLD CULT: NORMAL
BACTERIA BLD CULT: NORMAL
BACTERIA FLD AEROBE CULT: NORMAL
GRAM STN SPEC: NORMAL
SIGNIFICANT IND 70042: NORMAL
SITE SITE: NORMAL
SOURCE SOURCE: NORMAL

## 2024-02-27 NOTE — PROGRESS NOTES
IV removed on floor. Discharge paperwork discussed. All questions answered. Follow up appointment discussed. Patient discharged to [facility] via car with family. Patient has all personal belongings and medications from the pharmacy.

## 2024-02-27 NOTE — PROGRESS NOTES
Transitional Care Management  TCM Outreach Date and Time: Filed (2/27/2024 10:37 AM)    Discharge Questions  Actual Discharge Date: 02/26/24  Now that you are home, how are you feeling?: Fair  Did you receive any new prescriptions?: Yes (ASA, Atorvastatin, Furosemide, Losartan, Spironolactone)  Were you able to get them filled?: Yes  Meds to Bed or Pharmacy filled?: Meds to Bed  Do you have any questions about your current medications or new medications (Review Med Rec)?: Yes (Please explain) (STOP: Felodipine ER. Med tech at [facility] will need faxed order for any medication changes. I spoke to Arlet med tech at [facility] to confirm new medications. She only had furosemide,ASA and Cozaar and will look through the patients belongings for the others. She was given the phone number to [facility] if she is unable to find the Atorvastatin and Spironolactone. She will also need an order for the home O2, which I  faxed to [phone] . According to Arlet, Felodipine has not been an active medication in at least the past 3 months of living at [facility].   Did you have any durable medical equipment ordered?: Yes (Home Oxygen--[employer])  Did you receive it?: Yes  Do you have a follow up appointment scheduled with your PCP?: No ([facility] appt on 3/4/24)  Any issues or paperwork you wish to discuss with your PCP?: No  Are you (patient) able to get to the appointment?: Yes  If Home Health was ordered, have they contacted you (Patient): Not Applicable  Did you have enough support after your last discharge?: Yes  Does this patient qualify for the CCM program?: No (Does not have MCR DCE)    Transitional Care  Number of attempts made to contact patient: 1  Current or previous attempts completed within two business days of discharge? : Yes  Provided education regarding treatment plan, medications, self-management, ADLs?: Yes. Patient c/o bilateral knee pain (sometimes left sometimes right side), below the knee cap. Ice or  ney horn and Dario if an order can be faxed over by PCP. Observe for calf pain/swelling or behind the knee and seek medical care. US DVT r/o during admission.  Has patient completed an Advanced Directive?: No  Has the Care Manager's phone number provided?: No  Is there anything else I can help you with?: No    Discharge Summary  Chief Complaint: Leg swelling, rash, cough  Admitting Diagnosis: Volume overload (E87.70)  Discharge Diagnosis: Volume overoad

## 2024-03-04 ENCOUNTER — OFFICE VISIT (OUTPATIENT)
Dept: MEDICAL GROUP | Facility: PHYSICIAN GROUP | Age: 89
End: 2024-03-04
Payer: MEDICARE

## 2024-03-04 ENCOUNTER — APPOINTMENT (OUTPATIENT)
Dept: MEDICAL GROUP | Facility: PHYSICIAN GROUP | Age: 89
End: 2024-03-04
Payer: MEDICARE

## 2024-03-04 VITALS
OXYGEN SATURATION: 96 % | HEIGHT: 59 IN | RESPIRATION RATE: 20 BRPM | WEIGHT: 100 LBS | TEMPERATURE: 97.3 F | DIASTOLIC BLOOD PRESSURE: 64 MMHG | BODY MASS INDEX: 20.16 KG/M2 | SYSTOLIC BLOOD PRESSURE: 120 MMHG | HEART RATE: 94 BPM

## 2024-03-04 DIAGNOSIS — R54 AGE-RELATED PHYSICAL DEBILITY: ICD-10-CM

## 2024-03-04 DIAGNOSIS — J96.01 ACUTE RESPIRATORY FAILURE WITH HYPOXIA (HCC): ICD-10-CM

## 2024-03-04 DIAGNOSIS — I10 HTN (HYPERTENSION), BENIGN: ICD-10-CM

## 2024-03-04 DIAGNOSIS — R54 FRAILTY SYNDROME IN GERIATRIC PATIENT: ICD-10-CM

## 2024-03-04 DIAGNOSIS — Z91.81 RISK FOR FALLS: ICD-10-CM

## 2024-03-04 DIAGNOSIS — Z85.3 HX: BREAST CANCER: ICD-10-CM

## 2024-03-04 DIAGNOSIS — D36.9 TUBULAR ADENOMA: ICD-10-CM

## 2024-03-04 DIAGNOSIS — I50.9 CONGESTIVE HEART FAILURE, UNSPECIFIED HF CHRONICITY, UNSPECIFIED HEART FAILURE TYPE (HCC): ICD-10-CM

## 2024-03-04 DIAGNOSIS — Z71.89 ACP (ADVANCE CARE PLANNING): ICD-10-CM

## 2024-03-04 DIAGNOSIS — E78.5 DYSLIPIDEMIA: ICD-10-CM

## 2024-03-04 PROBLEM — B07.0 PLANTAR WART, RIGHT FOOT: Status: RESOLVED | Noted: 2023-11-14 | Resolved: 2024-03-04

## 2024-03-04 PROCEDURE — 99214 OFFICE O/P EST MOD 30 MIN: CPT

## 2024-03-04 PROCEDURE — 3078F DIAST BP <80 MM HG: CPT

## 2024-03-04 PROCEDURE — 3074F SYST BP LT 130 MM HG: CPT

## 2024-03-04 ASSESSMENT — ENCOUNTER SYMPTOMS
NAUSEA: 0
PALPITATIONS: 0
SHORTNESS OF BREATH: 0
COUGH: 0
BLOOD IN STOOL: 0
VOMITING: 0
WEAKNESS: 1
CHILLS: 0
WHEEZING: 0
WEIGHT LOSS: 0
FEVER: 0
TINGLING: 0
DIARRHEA: 0
HEADACHES: 0
DIZZINESS: 0
ABDOMINAL PAIN: 0
CONSTIPATION: 0

## 2024-03-04 ASSESSMENT — FIBROSIS 4 INDEX: FIB4 SCORE: 1.81

## 2024-03-04 NOTE — PROGRESS NOTES
Subjective:     Chief Complaint   Patient presents with    [facility]    Hospital Follow-up     Lizz Saldana is a 89 y.o. female, who is here today to establish care and discuss hospital follow-up. Her prior PCP was LUIS Adame.    Problem   Risk for Falls   Chf (Congestive Heart Failure) (Hcc)    Prior to her recent hospitalization patient was on very little to no medication daily.  Patient was started on furosemide, spironolactone, losartan as well as a low-dose aspirin and statin medication.  Recent echo showed 30% EF.  Patient is scheduled to see cardiology tomorrow on 3/5/2024.     Acute Respiratory Failure With Hypoxia (Hcc)   Age-Related Physical Debility    Patient is currently sitting in a wheelchair which is hers and her 's.     Htn (Hypertension), Benign    Patient is currently taking losartan 12.5 mg, spironolactone 12.5 mg, and Lasix 40 mg daily.  Blood pressure in office today within goal.  Patient is scheduled to see cardiology as her recent echocardiogram from her recent hospital admission showed EF of 30%.     Dyslipidemia    Currently taking atorvastatin 20 mg daily and low-dose aspirin  Lab Results   Component Value Date/Time    CHOLSTRLTOT 237 (H) 08/30/2023 0934    TRIGLYCERIDE 93 08/30/2023 0934    HDL 77 08/30/2023 0934     (H) 08/30/2023 0934      Plantar Wart, Right Foot (Resolved)      Allergies: Codeine, Floxin [ofloxacin], Lidoderm, Methylprednisolone sodium succ, and Toradol    Current Outpatient Medications:     Home Care Oxygen, Inhale 1 L/min continuous. DME [employer] O2 LPM @: 1 LPM Type of O2: Gas Oxygen via: N/C Specify Usage: Continuous Oxygen Modality: Concentrator and Portable Tank Humidification: Yes, Disp: , Rfl:     aspirin 81 MG EC tablet, Take 1 Tablet by mouth every day., Disp: 100 Tablet, Rfl: 0    atorvastatin (LIPITOR) 20 MG Tab, Take 1 Tablet by mouth every evening., Disp: 30 Tablet, Rfl: 0    furosemide (LASIX) 40 MG Tab, Take 1  "Tablet by mouth every day., Disp: 30 Tablet, Rfl: 0    losartan (COZAAR) 25 MG Tab, Take 0.5 Tablets by mouth every day., Disp: 30 Tablet, Rfl: 0    spironolactone (ALDACTONE) 25 MG Tab, Take 0.5 Tablets by mouth every day., Disp: 30 Tablet, Rfl: 3     Review of Systems   Constitutional:  Positive for malaise/fatigue. Negative for chills, fever and weight loss.   Respiratory:  Negative for cough, shortness of breath and wheezing.    Cardiovascular:  Positive for leg swelling. Negative for chest pain and palpitations.   Gastrointestinal:  Negative for abdominal pain, blood in stool, constipation, diarrhea, nausea and vomiting.   Genitourinary:  Negative for hematuria.   Skin:  Negative for itching and rash.   Neurological:  Positive for weakness. Negative for dizziness, tingling and headaches.     Health Maintenance: Completed    Objective:     /64   Pulse 94   Temp 36.3 °C (97.3 °F) (Temporal)   Resp 20   Ht 1.499 m (4' 11\")   Wt 45.4 kg (100 lb)   SpO2 96%  Body mass index is 20.2 kg/m².    Physical Exam  Vitals reviewed.   Constitutional:       General: She is not in acute distress.     Appearance: Normal appearance. She is not ill-appearing.   Cardiovascular:      Rate and Rhythm: Normal rate and regular rhythm.      Heart sounds: Normal heart sounds.   Pulmonary:      Effort: Pulmonary effort is normal.      Breath sounds: Normal breath sounds.   Abdominal:      General: Abdomen is flat.      Palpations: Abdomen is soft.   Musculoskeletal:      Cervical back: Normal range of motion.   Skin:     General: Skin is warm and dry.   Neurological:      General: No focal deficit present.      Mental Status: She is alert and oriented to person, place, and time.   Psychiatric:         Mood and Affect: Mood normal.         Behavior: Behavior normal.         Thought Content: Thought content normal.         Judgment: Judgment normal.     Assessment & Plan:     The following plan was discussed through shared " decision making with the patient:    1. HX: breast cancer  2. Tubular adenoma  - Referral to Hospice    3. Congestive heart failure, unspecified HF chronicity, unspecified heart failure type (HCC)  Scheduled to see cardiology on 3/5/2024.  - Referral to Hospice    4. Age-related physical debility  5. Acute respiratory failure with hypoxia (HCC)  6. ACP (advance care planning)  7. Frailty syndrome in geriatric patient  8. Risk for falls  Recent hospitalization for fluid overload and failure to thrive with history of breast cancer and colon polyps.  Referral been placed by hospice through mutual decision making of caregiver for further evaluation of palliative care.  - Referral to Hospice  - Patient identified as fall risk.  Appropriate orders and counseling given.    9. Dyslipidemia  Chronic, controlled.   The patient is on a statin for primary prevention and tolerating it well. The last cholesterol panel in 2023 was was outside of normal limits.  Patient is due for updated labs we will postpone any updated lab work and defer to cardiology for further management.    10. HTN (hypertension), benign  Chronic, controlled.  Currently taking Lasix, spironolactone, losartan and blood pressure within goal today.  We will continue this medication as patient is scheduled to see cardiology within the next day.  Will defer any lab work to cardiology at this point.      Return in about 3 months (around 6/4/2024) for Med Check.         Please note that this dictation was created using voice recognition software. I have made every reasonable attempt to correct obvious errors, but I expect that there are errors of grammar and possibly content that I did not discover before finalizing the note.    LUIS Carranza  [facility]  [facility]

## 2024-03-05 ENCOUNTER — OFFICE VISIT (OUTPATIENT)
Dept: CARDIOLOGY | Facility: MEDICAL CENTER | Age: 89
End: 2024-03-05
Attending: NURSE PRACTITIONER
Payer: MEDICARE

## 2024-03-05 VITALS
SYSTOLIC BLOOD PRESSURE: 90 MMHG | DIASTOLIC BLOOD PRESSURE: 40 MMHG | HEART RATE: 85 BPM | BODY MASS INDEX: 20.2 KG/M2 | HEIGHT: 59 IN | OXYGEN SATURATION: 96 % | RESPIRATION RATE: 14 BRPM

## 2024-03-05 DIAGNOSIS — I50.20 SYSTOLIC CONGESTIVE HEART FAILURE, UNSPECIFIED HF CHRONICITY (HCC): ICD-10-CM

## 2024-03-05 DIAGNOSIS — R62.7 FAILURE TO THRIVE IN ADULT: ICD-10-CM

## 2024-03-05 DIAGNOSIS — Z79.899 HIGH RISK MEDICATION USE: ICD-10-CM

## 2024-03-05 DIAGNOSIS — J96.11 CHRONIC RESPIRATORY FAILURE WITH HYPOXIA (HCC): ICD-10-CM

## 2024-03-05 DIAGNOSIS — I50.20 ACC/AHA STAGE D SYSTOLIC HEART FAILURE (HCC): ICD-10-CM

## 2024-03-05 PROCEDURE — 3074F SYST BP LT 130 MM HG: CPT | Performed by: NURSE PRACTITIONER

## 2024-03-05 PROCEDURE — 99213 OFFICE O/P EST LOW 20 MIN: CPT | Performed by: NURSE PRACTITIONER

## 2024-03-05 PROCEDURE — 99214 OFFICE O/P EST MOD 30 MIN: CPT | Performed by: NURSE PRACTITIONER

## 2024-03-05 PROCEDURE — 3078F DIAST BP <80 MM HG: CPT | Performed by: NURSE PRACTITIONER

## 2024-03-05 RX ORDER — ACETAMINOPHEN 325 MG/1
650 TABLET ORAL EVERY 6 HOURS PRN
COMMUNITY
Start: 2024-03-05

## 2024-03-05 RX ORDER — LOSARTAN POTASSIUM 25 MG/1
12.5 TABLET ORAL DAILY
Qty: 90 TABLET | Refills: 1 | Status: SHIPPED | OUTPATIENT
Start: 2024-03-05

## 2024-03-05 RX ORDER — FUROSEMIDE 40 MG/1
40 TABLET ORAL DAILY
Qty: 90 TABLET | Refills: 1 | Status: SHIPPED | OUTPATIENT
Start: 2024-03-05

## 2024-03-05 ASSESSMENT — MINNESOTA LIVING WITH HEART FAILURE QUESTIONNAIRE (MLHF)
DIFFICULTY WITH SEXUAL ACTIVITIES: 0
DIFFICULTY WITH RECREATIONAL PASTIMES, SPORTS, HOBBIES: 4
MAKING YOU WORRY: 5
WALKING ABOUT OR CLIMBING STAIRS DIFFICULT: 5
EATING LESS FOODS YOU LIKE: 4
COSTING YOU MONEY FOR MEDICAL CARE: 3
TOTAL_SCORE: 75
FEELING LIKE A BURDEN TO FAMILY AND FRIENDS: 4
MAKING YOU FEEL DEPRESSED: 4
SWELLING IN ANKLES OR LEGS: 4
GIVING YOU SIDE EFFECTS FROM TREATMENTS: 3
HAVING TO SIT OR LIE DOWN DURING THE DAY: 4
MAKING YOU SHORT OF BREATH: 4
DIFFICULTY GOING AWAY FROM HOME: 4
TIRED, FATIGUED OR LOW ON ENERGY: 5
DIFFICULTY WORKING TO EARN A LIVING: 0
DIFFICULTY TO CONCENTRATE OR REMEMBERING THINGS: 4
LOSS OF SELF CONTROL IN YOUR LIFE: 4
DIFFICULTY SLEEPING WELL AT NIGHT: 3
MAKING YOU STAY IN A HOSPITAL: 2
WORKING AROUND THE HOUSE OR YARD DIFFICULT: 5
DIFFICULTY SOCIALIZING WITH FAMILY OR FRIENDS: 4

## 2024-03-05 NOTE — ASSESSMENT & PLAN NOTE
Recent hospitalization for respiratory distress and hypoxia CHF exacerbation.  Patient was discharged last week from the hospital.  Patient is currently on 1 L of oxygen.  Denies any shortness of breath, wheezing, cough.  Patient's lower extremity edema has decreased per her caregiver.

## 2024-03-05 NOTE — PROGRESS NOTES
Chief Complaint   Patient presents with    Hypertension     N/P Dx: HTN (hypertension), benign    Congestive Heart Failure       Subjective     Lizz Saldana is a 89 y.o. female who presents today as heart failure new after recent hospitalization on 2/22/2024.  Patient has additional medical problems of hypertension, hyperlipidemia, breast cancer.  Patient did have bilateral pleural effusions status postthoracentesis during hospitalization.     Limited engagement by patient in office today.  History provided by patient's friend Bri who has been assisting in care coordination for patient as well as patient's  who is 89 where they are cared for at assisted living facility.    Bri reports that patient is very fatigued with poor appetite.  Patient has been referred to hospice by her primary care provider and are awaiting evaluation by hospice care.  Patient is wheelchair-bound with bilateral lower extremity generalized edema.  On exam, patient appears intravascularly euvolemic with no JVD, lungs are clear to auscultation, diminished in BLL, generalized 1+ edema in bilateral lower extremities, no ascites.     Agree with pending evaluation with hospice.  We will optimize medical therapy to maintain euvolemia and BP control.  We will stop spironolactone due to high risk medical therapy.  We discussed supplemental oxygen per PCP discretion as no cardiac mortality benefit.  We will defer ischemic workup at this time due to poor functional tolerance.  We will reevaluate patient's renal electrolyte function for possible potassium supplementation.  Patient to follow-up in 3 months, sooner if needed.    Past Medical History:   Diagnosis Date    Arthritis 2015    left knee    Bilateral cataracts     bilateral IOL    Cancer (HCC) 1990    Breast     Closed fracture of distal end of right radius 10/24/2022    2021, was seen by orthopedic    DJD (degenerative joint disease)     Encounter to establish care  08/29/2023    High cholesterol     HTN     HX: breast cancer     Hyperlipidemia     Osteoporosis     Pain 07/2015    right knee    Pigmented skin lesion 04/30/2015    Plantar wart, right foot     Postherpetic neuralgia     Tubular adenoma      Past Surgical History:   Procedure Laterality Date    KNEE MANIPULATION Right 10/12/2015    Procedure: KNEE MANIPULATION;  Surgeon: Ashish Serrano M.D.;  Location: SURGERY [facility];  Service:     KNEE ARTHROPLASTY TOTAL Right 7/13/2015    Procedure: KNEE ARTHROPLASTY TOTAL;  Surgeon: Ashish Serrano M.D.;  Location: SURGERY [facility];  Service:     CATARACT EXTRACTION WITH IOL  2000    Bilateral    ROTATOR CUFF REPAIR  1990    Left shoulder    MASTECTOMY  1990    Left    CYST EXCISION Left 1989    ganglion cyst removal, wrist    HYSTERECTOMY, TOTAL ABDOMINAL  1970     Family History   Problem Relation Age of Onset    Hypertension Mother     Other Father     Other Sister         alzheimer    Cancer Neg Hx     Diabetes Neg Hx     Heart Disease Neg Hx     Hyperlipidemia Neg Hx     Stroke Neg Hx      Social History     Socioeconomic History    Marital status:      Spouse name: Not on file    Number of children: 0    Years of education: Not on file    Highest education level: Not on file   Occupational History     Employer: RETIRED   Tobacco Use    Smoking status: Never    Smokeless tobacco: Never   Vaping Use    Vaping Use: Never used   Substance and Sexual Activity    Alcohol use: No     Alcohol/week: 0.0 oz    Drug use: No    Sexual activity: Never     Partners: Male   Other Topics Concern    Not on file   Social History Narrative    Not on file     Social Determinants of Health     Financial Resource Strain: Not on file   Food Insecurity: Not on file   Transportation Needs: Not on file   Physical Activity: Not on file   Stress: Not on file   Social Connections: Not on file   Intimate Partner Violence: Not on file   Housing Stability: Not on file  "    Allergies   Allergen Reactions    Codeine Nausea    Floxin [Ofloxacin] Itching and Swelling    Lidoderm      rash    Methylprednisolone Sodium Succ Rash and Itching     Painful skin blistering LE    Toradol Unspecified     .     Outpatient Encounter Medications as of 3/5/2024   Medication Sig Dispense Refill    acetaminophen (TYLENOL) 325 MG Tab Take 2 Tablets by mouth every 6 hours as needed for Mild Pain or Fever.      furosemide (LASIX) 40 MG Tab Take 1 Tablet by mouth every day. 90 Tablet 1    losartan (COZAAR) 25 MG Tab Take 0.5 Tablets by mouth every day. 90 Tablet 1    Home Care Oxygen Inhale 1 L/min continuous. DME [employer]  O2 LPM @: 1 LPM  Type of O2: Gas  Oxygen via: N/C  Specify Usage: Continuous  Oxygen Modality: Concentrator and Portable Tank  Humidification: Yes      [DISCONTINUED] aspirin 81 MG EC tablet Take 1 Tablet by mouth every day. 100 Tablet 0    [DISCONTINUED] atorvastatin (LIPITOR) 20 MG Tab Take 1 Tablet by mouth every evening. 30 Tablet 0    [DISCONTINUED] furosemide (LASIX) 40 MG Tab Take 1 Tablet by mouth every day. 30 Tablet 0    [DISCONTINUED] losartan (COZAAR) 25 MG Tab Take 0.5 Tablets by mouth every day. 30 Tablet 0    [DISCONTINUED] spironolactone (ALDACTONE) 25 MG Tab Take 0.5 Tablets by mouth every day. 30 Tablet 3     No facility-administered encounter medications on file as of 3/5/2024.     Review of Systems   Unable to perform ROS: Other (limited imput and responses to questions)              Objective     BP 90/40 (BP Location: Left arm, Patient Position: Sitting, BP Cuff Size: Adult)   Pulse 85   Resp 14   Ht 1.499 m (4' 11\")   SpO2 96%   BMI 20.20 kg/m²     Physical Exam  Vitals reviewed.   Constitutional:       General: She is not in acute distress.     Appearance: She is well-developed. She is ill-appearing.   HENT:      Head: Normocephalic and atraumatic.   Eyes:      Pupils: Pupils are equal, round, and reactive to light.   Neck:      Vascular: No JVD. "   Cardiovascular:      Rate and Rhythm: Normal rate and regular rhythm.      Heart sounds: Normal heart sounds. No murmur heard.     No friction rub. No gallop.   Pulmonary:      Effort: Pulmonary effort is normal. No respiratory distress.      Breath sounds: Examination of the right-lower field reveals decreased breath sounds. Examination of the left-lower field reveals decreased breath sounds. Decreased breath sounds present.      Comments: 1 L supplemental oxygen  Abdominal:      General: Bowel sounds are normal. There is no distension.      Palpations: Abdomen is soft.   Musculoskeletal:      Right lower le+ Edema present.      Left lower le+ Edema present.   Skin:     General: Skin is warm and dry.      Findings: No erythema.   Neurological:      Mental Status: She is alert and oriented to person, place, and time.   Psychiatric:         Behavior: Behavior normal.       Lab Results   Component Value Date/Time    CHOLSTRLTOT 237 (H) 2023 09:34 AM     (H) 2023 09:34 AM    HDL 77 2023 09:34 AM    TRIGLYCERIDE 93 2023 09:34 AM       Lab Results   Component Value Date/Time    SODIUM 138 2024 02:12 AM    POTASSIUM 3.9 2024 02:12 AM    CHLORIDE 101 2024 02:12 AM    CO2 28 2024 02:12 AM    GLUCOSE 105 (H) 2024 02:12 AM    BUN 21 2024 02:12 AM    CREATININE 0.55 2024 02:12 AM    CREATININE 0.81 2010 10:19 AM    BUNCREATRAT 23 2010 10:19 AM    GLOMRATE >59 2010 10:19 AM     Lab Results   Component Value Date/Time    ALKPHOSPHAT 123 (H) 2024 03:44 AM    ASTSGOT 43 2024 03:44 AM    ALTSGPT 54 (H) 2024 03:44 AM    TBILIRUBIN 0.8 2024 03:44 AM      EKG   Sinus rhythm with left bundle branch block    Echo 2024:  CONCLUSIONS  Mildly dilated LA, otherwise normal chamber sizes. Moderate to severely   depressed LV systolic function. LVEF 25-30% visually. Global   hyokinesis. Dilated IVC with <50%  inspiratory collapse consistent with   elevated central venous pressures. No signfiicant valvular disesase. No   prior study is available for comparison.          Assessment & Plan     1. Failure to thrive in adult  acetaminophen (TYLENOL) 325 MG Tab    Basic Metabolic Panel      2. Chronic respiratory failure with hypoxia (HCC)  acetaminophen (TYLENOL) 325 MG Tab    Basic Metabolic Panel      3. High risk medication use  acetaminophen (TYLENOL) 325 MG Tab    Basic Metabolic Panel      4. ACC/AHA stage D systolic heart failure (HCC)  acetaminophen (TYLENOL) 325 MG Tab    Basic Metabolic Panel      5. Systolic congestive heart failure, unspecified HF chronicity (HCC)  furosemide (LASIX) 40 MG Tab    losartan (COZAAR) 25 MG Tab    Basic Metabolic Panel          Medical Decision Making: Today's Assessment/Status/Plan:        HFrEF, Stage D, Class IV, LVEF 30%:  -Heart failure due to unknown etiology.  Defer ischemic workup at this time due to frailty  -Discussed Heart failure trajectory and prognosis with patient. Will continue to optimize medical therapy as tolerated. Advanced HF treatment, need for remote monitoring consideration at every visit.  -ACE-I/ARB/ARNI: Continue losartan 12.5 mg daily  -Evidence Based Beta-blocker: Defer at this time until goals of care determined  -Aldosterone Antagonist: Hold at this time due to high risk medical therapy  -SGLT2-I:  Defer at this time until goals of care determined   -Diuretic: continue furosemide 40 mg daily  -Labs: BMP in 1 week. Will continue to closely monitor for side effects of patient's high risk medication(s) including renal function, NTproBNP, and electrolytes as needed  -End-stage heart failure, no indication for ICD or CRT at this time  -Reinforced s/sx of worsening heart failure with patient and weight monitoring. Pt verbalizes understanding. Pt to call office if present.  -Advanced care planning: Advanced directives on file.  Patient pending care establish  with hospice      Follow-up with cardiology in approximately 3 months if goals of care change.    Patient and friend verbalizes understanding and agrees with the plan of care.     I personally spent a total of 34 minutes which includes face-to-face time and non-face-to-face time spent on preparing to see the patient, reviewing prior notes and tests, obtaining history from the patient, performing a medically appropriate exam, counseling and educating the patient, ordering medications/tests/procedures/referrals as clinically indicated, and documenting information in the electronic medical record.    YOLI Salcedo, HF-Cert   [facility]  [phone]    PLEASE NOTE: This Note was created using voice recognition Software. I have made every reasonable attempt to correct obvious errors, but I expect that there are errors of grammar and possibly content that I did not discover before finalizing the note

## 2024-03-08 NOTE — DISCHARGE SUMMARY
Discharge Summary    CHIEF COMPLAINT ON ADMISSION  Chief Complaint   Patient presents with    Leg Swelling    Rash    Cough       Reason for Admission  Flu like Symptoms     Admission Date  2/22/2024    CODE STATUS  Prior    HPI & HOSPITAL COURSE  This is a 89 y.o. female with history of hypertension who presented 2/22/2024 with evaluation for increased lower extremity swelling, cough.  Patient reported not feeling well for the past several days.  She endorsed subjective fever, chills, cough associated shortness of breath and increased lower extremity swelling.  In ER, no leukocytosis, noted to have elevated troponin T and proBNP.  CTA chest did not show PE but noted bilateral pleural effusion.  Lower extremity Doppler did not show DVT.  She tested negative for COVID/influenza/RSV   Patient admitted for further work up and treatment   She had right thoracentesis done with fluid removed which was transudate due to HF. Echo was done showing EF of 30%. Cardiology was consulted and patient started on GDMT.  Patient now doing better will need to follow up with HF clinic as outpatient will discharge patient to her assisted living today      The patient met 2-midnight criteria for an inpatient stay at the time of discharge.    Discharge Date  2/26/2024    FOLLOW UP ITEMS POST DISCHARGE  Cardiology     DISCHARGE DIAGNOSES  Principal Problem:    Volume overload (POA: Yes)  Active Problems:    Dyslipidemia (POA: Yes)      Overview: Currently taking atorvastatin 20 mg daily and low-dose aspirin      Lab Results       Component Value Date/Time        CHOLSTRLTOT 237 (H) 08/30/2023 0934        TRIGLYCERIDE 93 08/30/2023 0934        HDL 77 08/30/2023 0934         (H) 08/30/2023 0934           HX: breast cancer (POA: Yes)    HTN (hypertension), benign (POA: Yes)      Overview: Patient is currently taking losartan 12.5 mg, spironolactone 12.5 mg, and       Lasix 40 mg daily.      Blood pressure in office today within goal.   Patient is scheduled to see       cardiology as her recent echocardiogram from her recent hospital admission       showed EF of 30%.    ACP (advance care planning) (POA: Yes)    CHF (congestive heart failure) (HCC) (POA: Unknown)      Overview: Prior to her recent hospitalization patient was on very little to no       medication daily.  Patient was started on furosemide, spironolactone,       losartan as well as a low-dose aspirin and statin medication.      Recent echo showed 30% EF.  Patient is scheduled to see cardiology       tomorrow on 3/5/2024.    Elevated troponin (POA: Unknown)    Bilateral leg edema (POA: Unknown)    Acute respiratory failure with hypoxia (HCC) (POA: Unknown)    Age-related physical debility (POA: Unknown)      Overview: Patient is currently sitting in a wheelchair which is hers and her       's.  Resolved Problems:    * No resolved hospital problems. *      FOLLOW UP  Future Appointments   Date Time Provider Department Center   6/6/2024 12:45 PM TREVA Salcedo [facility] None     [facility]  [address]  [place] [address]  [phone]    Follow up in 1 week(s)        MEDICATIONS ON DISCHARGE     Medication List        STOP taking these medications      felodipine ER 10 MG Tb24  Commonly known as: Plendil              Allergies  Allergies   Allergen Reactions    Codeine Nausea    Floxin [Ofloxacin] Itching and Swelling    Lidoderm      rash    Methylprednisolone Sodium Succ Rash and Itching     Painful skin blistering LE    Toradol Unspecified     .       DIET  No orders of the defined types were placed in this encounter.      ACTIVITY  As tolerated.  Weight bearing as tolerated    CONSULTATIONS  Cardiology     PROCEDURES  None     LABORATORY  Lab Results   Component Value Date    SODIUM 138 02/26/2024    POTASSIUM 3.9 02/26/2024    CHLORIDE 101 02/26/2024    CO2 28 02/26/2024    GLUCOSE 105 (H) 02/26/2024    BUN 21 02/26/2024     CREATININE 0.55 02/26/2024    CREATININE 0.81 02/17/2010    GLOMRATE >59 02/17/2010        Lab Results   Component Value Date    WBC 10.1 02/26/2024    WBC 6.8 02/17/2010    HEMOGLOBIN 14.6 02/26/2024    HEMATOCRIT 43.6 02/26/2024    PLATELETCT 310 02/26/2024        Total time of the discharge process exceeds 35 minutes.

## 2024-03-10 ENCOUNTER — PATIENT MESSAGE (OUTPATIENT)
Dept: CARDIOLOGY | Facility: MEDICAL CENTER | Age: 89
End: 2024-03-10
Payer: MEDICARE

## 2024-03-25 LAB
FUNGUS SPEC CULT: NORMAL
FUNGUS SPEC FUNGUS STN: NORMAL
SIGNIFICANT IND 70042: NORMAL
SITE SITE: NORMAL
SOURCE SOURCE: NORMAL

## 2024-04-16 LAB
MYCOBACTERIUM SPEC CULT: NORMAL
RHODAMINE-AURAMINE STN SPEC: NORMAL
SIGNIFICANT IND 70042: NORMAL
SITE SITE: NORMAL
SOURCE SOURCE: NORMAL

## 2024-06-03 ENCOUNTER — APPOINTMENT (OUTPATIENT)
Dept: RADIOLOGY | Facility: MEDICAL CENTER | Age: 89
End: 2024-06-03
Attending: EMERGENCY MEDICINE
Payer: MEDICARE

## 2024-06-03 ENCOUNTER — HOSPITAL ENCOUNTER (EMERGENCY)
Facility: MEDICAL CENTER | Age: 89
End: 2024-06-03
Attending: EMERGENCY MEDICINE
Payer: MEDICARE

## 2024-06-03 VITALS
BODY MASS INDEX: 22.18 KG/M2 | DIASTOLIC BLOOD PRESSURE: 55 MMHG | SYSTOLIC BLOOD PRESSURE: 114 MMHG | TEMPERATURE: 98.5 F | OXYGEN SATURATION: 92 % | HEART RATE: 87 BPM | WEIGHT: 110 LBS | HEIGHT: 59 IN | RESPIRATION RATE: 15 BRPM
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
## 2024-06-03 DIAGNOSIS — S42.035A CLOSED NONDISPLACED FRACTURE OF ACROMIAL END OF LEFT CLAVICLE, INITIAL ENCOUNTER: ICD-10-CM

## 2024-06-03 LAB — EKG IMPRESSION: NORMAL

## 2024-06-03 PROCEDURE — 70450 CT HEAD/BRAIN W/O DYE: CPT

## 2024-06-03 PROCEDURE — 73030 X-RAY EXAM OF SHOULDER: CPT | Mod: LT

## 2024-06-03 PROCEDURE — 93005 ELECTROCARDIOGRAM TRACING: CPT | Performed by: EMERGENCY MEDICINE

## 2024-06-03 PROCEDURE — 99284 EMERGENCY DEPT VISIT MOD MDM: CPT

## 2024-06-03 ASSESSMENT — PAIN DESCRIPTION - PAIN TYPE
TYPE: ACUTE PAIN
TYPE: ACUTE PAIN

## 2024-06-03 ASSESSMENT — FIBROSIS 4 INDEX: FIB4 SCORE: 1.81

## 2024-06-04 NOTE — ED PROVIDER NOTES
August 30, 2023       Mima Croft MD  [address]  [facility] [address]  Via In Basket      Patient: Kwadwo Sy   YOB: 1965   Date of Visit: 8/30/2023       Dear Dr. Croft:    Thank you for referring Holland Sy to me for evaluation. Below are my notes for this visit with him.    If you have questions, please do not hesitate to call me. I look forward to following your patient along with you.      Sincerely,        Kishan Logan MD        CC: No Recipients    Kishan Logan MD  8/30/2023 12:03 PM  Signed  Surgeon: Doreen    Procedure: Surgical arthroscopy LEFT knee with medial meniscus root repair    [facility]    Assist: Yes    Anesthesia: MAC    Block: Adductor single shot    Duration: 45min    Antibiotics: Yes, ancef or clindamycin if PCN allergy    Labs: Yes, age-appropriate    Equipment: Standard knee arthroscopy instrumentation, Arthrex meniscus root repair kit with knee scorpion    Position: Supine    Table: Standard    DME: Hinged knee brace (TROM) and Compression Stockings    Need pre-op clearance: Yes    Follow-Up: Standard    Kishan Logan MD  8/30/2023 12:03 PM  Signed  Subjective  Holland is a 58 year old male who returns with LEFT knee pain  PCP: Mima Croft MD      Interval History: Patient is in the office today following up for LEFT knee pain. MRI of his LEFT knee was obtained on 8/23/23.  He states that the knee intermittently comes and goes but he does continue to have medial LEFT knee pain.        Objective    Physical Examination:     Constitutional:  Well-developed, well-nourished male in no acute distress.  Psychiatric:  Normal affect  Skin: Warm, dry, intact without rash or lesion.  Neck: Normal appearing neck  Pulmonary: No labored respirations  CV: normal rate  Musculoskeletal:    LEFT knee:   Gait: Normal     Soft tissue: The skin about the knee is intact. There is no ecchymosis. There is no joint effusion.      Patella: The patella  ED Provider Note    Scribed for Dr. Moore by Aydee Hickman. 6/3/2024,  7:35 PM.      CHIEF COMPLAINT  Chief Complaint   Patient presents with    T-5000 GLF       EXTERNAL RECORDS REVIEWED  Outpatient Notes Patient seen by cardiology on 3/5/24 for failure to thrive. Patient with a history of CHF and hypertension.    HPI  LIMITATION TO HISTORY   Select: : None  OUTSIDE HISTORIAN(S):  None    Lizz Saldana is a 89 y.o. female, with a history of CHF, hypertension, arthritis, and osteoporosis, who presents to the Emergency Department, via EMS from [facility], following a mechanical ground level fall prior to arrival. At this time she states that she has associated left shoulder pain and a headache from the fall, but no other pains. She does not know why she fell.     REVIEW OF SYSTEMS  See HPI for further details. All other systems are negative.     PAST MEDICAL HISTORY     Past Medical History:   Diagnosis Date    Arthritis 2015    left knee    Bilateral cataracts     bilateral IOL    Cancer (HCC) 1990    Breast     Closed fracture of distal end of right radius 10/24/2022    2021, was seen by orthopedic    DJD (degenerative joint disease)     Encounter to establish care 08/29/2023    High cholesterol     HTN     HX: breast cancer     Hyperlipidemia     Osteoporosis     Pain 07/2015    right knee    Pigmented skin lesion 04/30/2015    Plantar wart, right foot     Postherpetic neuralgia     Tubular adenoma        SURGICAL HISTORY  Past Surgical History:   Procedure Laterality Date    KNEE MANIPULATION Right 10/12/2015    Procedure: KNEE MANIPULATION;  Surgeon: Ashish Serrano M.D.;  Location: SURGERY [facility];  Service:     KNEE ARTHROPLASTY TOTAL Right 7/13/2015    Procedure: KNEE ARTHROPLASTY TOTAL;  Surgeon: Ashish Serrano M.D.;  Location: SURGERY [facility];  Service:     CATARACT EXTRACTION WITH IOL  2000    Bilateral    ROTATOR CUFF REPAIR  1990    Left shoulder     "MASTECTOMY  1990    Left    CYST EXCISION Left 1989    ganglion cyst removal, wrist    HYSTERECTOMY, TOTAL ABDOMINAL  1970       FAMILY HISTORY  Family History   Problem Relation Age of Onset    Hypertension Mother     Other Father     Other Sister         alzheimer    Cancer Neg Hx     Diabetes Neg Hx     Heart Disease Neg Hx     Hyperlipidemia Neg Hx     Stroke Neg Hx        SOCIAL HISTORY    reports that she has never smoked. She has never used smokeless tobacco. She reports that she does not drink alcohol and does not use drugs.    CURRENT MEDICATIONS  Home Medications       Reviewed by Daniel Roa R.N. (Registered Nurse) on 06/03/24 at 1932  Med List Status: Partial     Medication Last Dose Status   acetaminophen (TYLENOL) 325 MG Tab  Active   furosemide (LASIX) 40 MG Tab  Active   Home Care Oxygen  Active   losartan (COZAAR) 25 MG Tab  Active        ALLERGIES  Allergies   Allergen Reactions    Codeine Nausea    Floxin [Ofloxacin] Itching and Swelling    Lidoderm      rash    Methylprednisolone Sodium Succ Rash and Itching     Painful skin blistering LE    Toradol Unspecified     .       PHYSICAL EXAM  VITAL SIGNS: BP (!) 143/63   Pulse 77   Temp 36.9 °C (98.4 °F) (Temporal)   Resp 20   Ht 1.499 m (4' 11\")   Wt 49.9 kg (110 lb)   SpO2 93%   BMI 22.22 kg/m²   Gen: Alert, oriented  HENT: No racoon eyes, hemotympanum, septal hematoma, facial instability  Eye: EOMI, no chemosis, PERRL  Neck: trachea midline, no tenderness  Resp: no respiratory distress, CTAB, no chest wall tenderness or crepitus  CV: No JVD, RRR. no m/r/g, + peripheral pulses. No pedal edema.   Abd: soft, non-distended, non-tender. No ecchymosis  Back: no spinal tenderness or deformities  Ext: No deformities or edema. Left shoulder tenderness.  Psych: normal mood  Neuro: speech fluent, moves all extremities. GCS 15    DIAGNOSTIC STUDIES / PROCEDURES    RADIOLOGY  I have independently interpreted the diagnostic imaging associated with this " tracked centrally and has roughly 2 quadrants of lateral translation with appropriate restraints. There is tenderness to palpation at the medial patellar facet.       Range of motion:   PROM: 0-130° without pain symmetric to the contralateral knee     Ligament Exam:  At 30° varus laxity: negative  At 30° valgus laxity: causes pain  Lachman's: negative  Anterior drawer: negative   Posterior drawer: negative     Tenderness:   There is tenderness to palpation in the medial joint line.     Nida's: positive  Dial test: Unable to be performed due to guarding  Pivot shift test: Unable to be performed due to guarding     Sensation intact to light touch in the DP/SP/sural/saphenous/tibial nerve distribution, EHL/TA/GS 5/5, DP 2+.      Imaging Reading/Results:  XRAY KNEE 3 VW LEFT  Radiographs of the LEFT knee from our office including a 45' bilateral   standing PA, bilateral sunrise, and lateral dated 8/11/2023 were obtained   and available for review by myself: The radiographs show osteophytes on   the medial tibial spine otherwise no degenerative joint disease, no   fracture or dislocation, central patellofemoral tracking, normal patellar   Height.    Imaging Reading/Results:  MRI KNEE LEFT WO CONTRAST  Narrative: EXAM: MRI KNEE LEFT WO CONTRAST    CLINICAL INDICATION: Knee trauma, meniscal/ligament injury suspected, xray  done (Age >= 1y), golfer, heard popping sensation, knee pain    COMPARISON: Left knee radiographs 8/11/2023    TECHNIQUE: Multiplanar multisequence MR imaging of the left knee was  performed without intravenous contrast material in standard protocol.    FINDINGS: Small joint effusion noted.    The lateral meniscus is intact, without discrete linear tear extending to  an articular surface noted. There is abnormal signal intensity consistent  with partial tearing in the vicinity of posterior root attachment at the  posterior horn of medial meniscus (coronal series 7, images 21 to-24 and  sagittal  series 5, images 13-16). This includes signal abnormality  extending to the inferior articular surface of the posterior horn at this  level (series 5, image 14). Additional linear signal abnormality is seen in  the body of the medial meniscus, not definitively extending to articular  surface (series 5, image 9). However, there is additional component of  patchy signal abnormality in the body of the medial meniscus which does  appear to involve the inferior articular surface (series 7, image 21)  suspicious for small area of tearing/fraying in this area.    The anterior and posterior cruciate ligaments are intact.     The medial collateral ligament, lateral collateral complex, and extensor  mechanism are intact. Tiny amount of fluid signal intensity is seen in the  deep infrapatellar bursa.    There is some T2 signal hyperintensity within the cartilage at medial  patellar facet near the median patellar ridge, with some subjacent  subcortical signal abnormality, consistent with chondromalacia and  arthropathy related change. Mild cartilage thinning seen at medial  compartment of the knee, with no full-thickness cartilage defect noted.  Impression: 1. Degeneration and tearing/fraying involving the medial meniscus,  including tearing in the vicinity of the posterior root attachment.  2. Small joint effusion.  3. Cartilage thinning/chondromalacia as described.    Electronically Signed by: SHAHEEN YIP M.D.   Signed on: 8/23/2023 9:24 AM   Workstation ID: [id number]      Assessment  Problem List Items Addressed This Visit    None  Visit Diagnoses     Tear of medial meniscus of left knee, unspecified tear type, unspecified whether old or current tear, initial encounter    -  Primary          Plan:  1.  We discussed nonoperative treatment options consist of rest, ice, nonsteroidal anti-inflammatory drugs, formal physical therapy, and a cortisone injection.  The plan for surgery consists of surgical arthroscopy of the  visit.  My preliminary interpretation is as follows: X-ray shoulder: Distal left clavicle fracture  Radiologist interpretation:    CT-HEAD W/O   Final Result         1.  No acute intracranial abnormality is identified, there are nonspecific white matter changes, commonly associated with small vessel ischemic disease.  Associated mild cerebral atrophy is noted.   2.  Atherosclerosis.         DX-SHOULDER 2+ LEFT   Final Result         1.  No acute traumatic bony injury.   2.  Atherosclerosis           Results for orders placed or performed during the hospital encounter of 24   EKG (NOW)   Result Value Ref Range    Report       [facility] Emergency Dept.    Test Date:  2024  Pt Name:    MIREILLE CHAPA                 Department: ER  MRN:        [id number]                      Room:        22  Gender:     Female                       Technician: [id number]  :        [id number]                   Requested By:DENNIS MOORE  Order #:    [id number]                    Reading MD: Dennis Moore    Measurements  Intervals                                Axis  Rate:       92                           P:          71  RI:         166                          QRS:        87  QRSD:       142                          T:          13  QT:         431  QTc:        534    Interpretive Statements  Sinus rhythm  Biatrial enlargement  Left ventricular hypertrophy  Anterior infarct, old  Prolonged QT interval  Baseline wander in lead(s) V1  Compared to ECG 2024 12:23:15  Atrial abnormality now present  Left ventricular hypertrophy now present  Myocardial infarct finding now present  Prolonged QT interval now p resent  Left bundle-branch block no longer present  Electronically Signed On 2024 20:11:58 PDT by Dennis Moore           COURSE & MEDICAL DECISION MAKING  Pertinent Labs & Imaging studies were reviewed. (See chart for details)    -----------    7:35 PM Patient seen and examined at bedside. Discussed plan  LEFT knee with medial meniscus root repair.  The risks of surgery include but are not limited to infection, bleeding, nerve damage, stiffness, persistent pain, retear, incomplete resolution of symptoms, hardware complications, DVT, medical complications including death, and need for further surgery.  The benefits include improvement of pain and function.  The postoperative rehabilitation course was discussed.  Full recovery will likely take 4 to 6 months.  2.  At this time, the patient is interested in proceeding forward with artery.  They will call to schedule surgery at their convenience.  They will require preoperative medical clearance.  I will see the patient the morning of surgery.    All questions were answered.  The patient understands the plan and wishes to proceed as such.      Kishan Logan MD  Orthopaedic Surgeon, Adult & Pediatric Sports Medicine  [facility]  P: [phone]  F: [phone]           for imaging to rule out fractures or intracranial bleeds. Patient verbalizes understanding and agreement to this plan of care.     INITIAL ASSESSMENT AND PLAN  Medical Decision Making: Patient presents after a fall with pain to the left shoulder.  CT scan of the head is negative.  Remainder of the patient's trauma assessment is reassuring.  X-ray of the shoulder was read as negative but I believe there is a subtle distal fracture.  This appears to fit clinically.    The patient's EKG demonstrates no high risk features to suggest ischemia or other significant abnormalities.  Patient will be discharged home, referred to orthopedics for follow-up.    ADDITIONAL PROBLEM LIST AND DISPOSITION      Escalation of care considered, and ultimately not performed: acute inpatient care management, however at this time, the patient is most appropriate for outpatient management.       Decision tools and prescription drugs considered including, but not limited to: Pain Medications recommend nonopioid over-the-counter pain medication.  Opioids are likely to contribute to further falls. .    The patient will return for new or worsening symptoms and is stable at the time of discharge.    The patient is referred to a primary physician for blood pressure management, diabetic screening, and for all other preventative health concerns.    DISPOSITION:  Patient will be discharged home in stable condition.    FOLLOW UP:  Kee Lugo M.D.  [address] [facility] [id number] [phone]    Schedule an appointment as soon as possible for a visit       [facility], Emergency Dept  [address] [address] [phone]    If symptoms worsen            FINAL IMPRESSION  1. Closed nondisplaced fracture of acromial end of left clavicle, initial encounter    2. Fall from ground level             I, Aydee Hickman am (Scribe) scribing for, and in the presence of, Jhon Moore M.D..    Electronically  signed by: Aydee Hickman (Scribe), 6/3/2024    I, Jhon Moore M.D. personally performed the services described in this documentation, as scribed by Aydee Hickman in my presence, and it is both accurate and complete.    The note accurately reflects work and decisions made by me.  Jhon Moore M.D.  6/3/2024  10:18 PM      This dictation was created using voice recognition software. The accuracy of the dictation is limited to the abilities of the software. I expect there may be some errors of grammar and possibly content. The nursing notes were reviewed and certain aspects of this information were incorporated into this note.

## 2024-06-04 NOTE — ED NOTES
Spoke with Neda Finley ([facility]) RN to update pt on status. Aware of need for imaging and dispo.       Phone: [phone]

## 2024-06-04 NOTE — ED NOTES
Sling placed at bedside by this RN. Pt NV intact before and after placement of sling. Pt tolerated well.

## 2024-06-04 NOTE — DISCHARGE INSTRUCTIONS
You were seen in the emerged part after suffering a fall.  The CAT scan of your head was reassuring but you do appear to have a break in the collarbone.  This will generally heal on its own.  You are being placed into a sling for comfort.  You are also being referred to orthopedics for follow-up.    You may take acetaminophen for pain.    Return to the emergency department or seek medical attention if you develop:  Difficulty breathing, chest pain, vomiting, any other new or concerning findings

## 2024-06-04 NOTE — ED NOTES
Reviewed discharge instructions, pt verbalized understanding of follow up with Ortho and PCP. Pt encouraged to return to the ED for SOB, CP, fever, or any other new/concerning symptoms.    Pt with caregiver on discharge, pt brought to personal vehicle by RN and tech/paramedic student available to assist pt into car. Ivs remvoed and all pt belongings including POLST with caregiver.

## 2024-06-04 NOTE — ED NOTES
Called pt's caregiver Neda. Per Neda hospice RN was supposed to come  pt but she is on her way now ETA 20 min.

## 2024-06-04 NOTE — ED TRIAGE NOTES
"Chief Complaint   Patient presents with    T-5000 GLF   BP (!) 143/63   Pulse 77   Temp 36.9 °C (98.4 °F) (Temporal)   Resp 20   Ht 1.499 m (4' 11\")   Wt 49.9 kg (110 lb)   SpO2 93%   BMI 22.22 kg/m²     Pt BIBA from [facility] for evaluation of L shoulder pain s/p MGLF that occurred ~1 hour PTA. Pt reports that she was standing when she tripped and fell, landing on the L side.     (-) LOC  (-) headstrike  (-) thinners     Pt is AAOx4 c/o 8/10 L shoulder pain.    Per EMS pt is on hospice care for CHF. POLST With pt shows DNR/DNI with comfort care.     1mg morphine PO by hospice care and 1000 mg tylenol IV administered en route.   "

## 2024-06-07 ENCOUNTER — APPOINTMENT (OUTPATIENT)
Dept: RADIOLOGY | Facility: MEDICAL CENTER | Age: 89
DRG: 194 | End: 2024-06-07
Attending: STUDENT IN AN ORGANIZED HEALTH CARE EDUCATION/TRAINING PROGRAM
Payer: MEDICARE

## 2024-06-07 ENCOUNTER — HOSPITAL ENCOUNTER (INPATIENT)
Facility: MEDICAL CENTER | Age: 89
LOS: 1 days | DRG: 194 | End: 2024-06-08
Attending: STUDENT IN AN ORGANIZED HEALTH CARE EDUCATION/TRAINING PROGRAM | Admitting: STUDENT IN AN ORGANIZED HEALTH CARE EDUCATION/TRAINING PROGRAM
Payer: MEDICARE

## 2024-06-07 DIAGNOSIS — J18.9 PNEUMONIA OF BOTH LOWER LOBES DUE TO INFECTIOUS ORGANISM: ICD-10-CM

## 2024-06-07 DIAGNOSIS — T40.2X5A CONSTIPATION DUE TO OPIOID THERAPY: ICD-10-CM

## 2024-06-07 DIAGNOSIS — R29.6 MULTIPLE FALLS: ICD-10-CM

## 2024-06-07 DIAGNOSIS — S22.42XA CLOSED FRACTURE OF MULTIPLE RIBS OF LEFT SIDE, INITIAL ENCOUNTER: ICD-10-CM

## 2024-06-07 DIAGNOSIS — K59.03 CONSTIPATION DUE TO OPIOID THERAPY: ICD-10-CM

## 2024-06-07 DIAGNOSIS — R09.02 HYPOXIA: ICD-10-CM

## 2024-06-07 DIAGNOSIS — J18.9 COMMUNITY ACQUIRED PNEUMONIA, UNSPECIFIED LATERALITY: ICD-10-CM

## 2024-06-07 LAB
ALBUMIN SERPL BCP-MCNC: 3.3 G/DL (ref 3.2–4.9)
ALBUMIN/GLOB SERPL: 1 G/DL
ALP SERPL-CCNC: 131 U/L (ref 30–99)
ALT SERPL-CCNC: 23 U/L (ref 2–50)
ANION GAP SERPL CALC-SCNC: 16 MMOL/L (ref 7–16)
APPEARANCE UR: CLEAR
AST SERPL-CCNC: 36 U/L (ref 12–45)
BACTERIA #/AREA URNS HPF: NEGATIVE /HPF
BASOPHILS # BLD AUTO: 0.2 % (ref 0–1.8)
BASOPHILS # BLD: 0.03 K/UL (ref 0–0.12)
BILIRUB SERPL-MCNC: 2.3 MG/DL (ref 0.1–1.5)
BILIRUB UR QL STRIP.AUTO: NEGATIVE
BUN SERPL-MCNC: 34 MG/DL (ref 8–22)
CALCIUM ALBUM COR SERPL-MCNC: 8.8 MG/DL (ref 8.5–10.5)
CALCIUM SERPL-MCNC: 8.2 MG/DL (ref 8.5–10.5)
CHLORIDE SERPL-SCNC: 94 MMOL/L (ref 96–112)
CO2 SERPL-SCNC: 23 MMOL/L (ref 20–33)
COLOR UR: YELLOW
CREAT SERPL-MCNC: 0.71 MG/DL (ref 0.5–1.4)
EOSINOPHIL # BLD AUTO: 0.02 K/UL (ref 0–0.51)
EOSINOPHIL NFR BLD: 0.2 % (ref 0–6.9)
EPI CELLS #/AREA URNS HPF: NEGATIVE /HPF
ERYTHROCYTE [DISTWIDTH] IN BLOOD BY AUTOMATED COUNT: 47.5 FL (ref 35.9–50)
GFR SERPLBLD CREATININE-BSD FMLA CKD-EPI: 77 ML/MIN/1.73 M 2
GLOBULIN SER CALC-MCNC: 3.3 G/DL (ref 1.9–3.5)
GLUCOSE SERPL-MCNC: 132 MG/DL (ref 65–99)
GLUCOSE UR STRIP.AUTO-MCNC: NEGATIVE MG/DL
HCT VFR BLD AUTO: 39.7 % (ref 37–47)
HGB BLD-MCNC: 13.7 G/DL (ref 12–16)
HYALINE CASTS #/AREA URNS LPF: ABNORMAL /LPF
IMM GRANULOCYTES # BLD AUTO: 0.06 K/UL (ref 0–0.11)
IMM GRANULOCYTES NFR BLD AUTO: 0.5 % (ref 0–0.9)
KETONES UR STRIP.AUTO-MCNC: NEGATIVE MG/DL
LEUKOCYTE ESTERASE UR QL STRIP.AUTO: NEGATIVE
LYMPHOCYTES # BLD AUTO: 1.94 K/UL (ref 1–4.8)
LYMPHOCYTES NFR BLD: 16.1 % (ref 22–41)
MCH RBC QN AUTO: 30.4 PG (ref 27–33)
MCHC RBC AUTO-ENTMCNC: 34.5 G/DL (ref 32.2–35.5)
MCV RBC AUTO: 88 FL (ref 81.4–97.8)
MICRO URNS: ABNORMAL
MONOCYTES # BLD AUTO: 1.44 K/UL (ref 0–0.85)
MONOCYTES NFR BLD AUTO: 12 % (ref 0–13.4)
NEUTROPHILS # BLD AUTO: 8.53 K/UL (ref 1.82–7.42)
NEUTROPHILS NFR BLD: 71 % (ref 44–72)
NITRITE UR QL STRIP.AUTO: NEGATIVE
NRBC # BLD AUTO: 0 K/UL
NRBC BLD-RTO: 0 /100 WBC (ref 0–0.2)
NT-PROBNP SERPL IA-MCNC: 2475 PG/ML (ref 0–125)
PH UR STRIP.AUTO: 6 [PH] (ref 5–8)
PLATELET # BLD AUTO: 224 K/UL (ref 164–446)
PMV BLD AUTO: 10.2 FL (ref 9–12.9)
POTASSIUM SERPL-SCNC: 3.5 MMOL/L (ref 3.6–5.5)
PROT SERPL-MCNC: 6.6 G/DL (ref 6–8.2)
PROT UR QL STRIP: NEGATIVE MG/DL
RBC # BLD AUTO: 4.51 M/UL (ref 4.2–5.4)
RBC # URNS HPF: ABNORMAL /HPF
RBC UR QL AUTO: ABNORMAL
SODIUM SERPL-SCNC: 133 MMOL/L (ref 135–145)
SP GR UR STRIP.AUTO: 1.02
TROPONIN T SERPL-MCNC: 22 NG/L (ref 6–19)
UROBILINOGEN UR STRIP.AUTO-MCNC: 0.2 MG/DL
WBC # BLD AUTO: 12 K/UL (ref 4.8–10.8)
WBC #/AREA URNS HPF: ABNORMAL /HPF

## 2024-06-07 PROCEDURE — 73130 X-RAY EXAM OF HAND: CPT | Mod: LT

## 2024-06-07 PROCEDURE — 85025 COMPLETE CBC W/AUTO DIFF WBC: CPT

## 2024-06-07 PROCEDURE — 83880 ASSAY OF NATRIURETIC PEPTIDE: CPT

## 2024-06-07 PROCEDURE — 84484 ASSAY OF TROPONIN QUANT: CPT

## 2024-06-07 PROCEDURE — 72125 CT NECK SPINE W/O DYE: CPT

## 2024-06-07 PROCEDURE — 71045 X-RAY EXAM CHEST 1 VIEW: CPT

## 2024-06-07 PROCEDURE — 99285 EMERGENCY DEPT VISIT HI MDM: CPT

## 2024-06-07 PROCEDURE — 36415 COLL VENOUS BLD VENIPUNCTURE: CPT

## 2024-06-07 PROCEDURE — 70450 CT HEAD/BRAIN W/O DYE: CPT

## 2024-06-07 PROCEDURE — 93005 ELECTROCARDIOGRAM TRACING: CPT

## 2024-06-07 PROCEDURE — 302874 HCHG BANDAGE ACE 2 OR 3""

## 2024-06-07 PROCEDURE — 29125 APPL SHORT ARM SPLINT STATIC: CPT

## 2024-06-07 PROCEDURE — 71275 CT ANGIOGRAPHY CHEST: CPT

## 2024-06-07 PROCEDURE — 700105 HCHG RX REV CODE 258: Performed by: STUDENT IN AN ORGANIZED HEALTH CARE EDUCATION/TRAINING PROGRAM

## 2024-06-07 PROCEDURE — 84145 PROCALCITONIN (PCT): CPT

## 2024-06-07 PROCEDURE — 80053 COMPREHEN METABOLIC PANEL: CPT

## 2024-06-07 PROCEDURE — 73110 X-RAY EXAM OF WRIST: CPT | Mod: LT

## 2024-06-07 PROCEDURE — 81001 URINALYSIS AUTO W/SCOPE: CPT

## 2024-06-07 RX ORDER — SODIUM CHLORIDE, SODIUM LACTATE, POTASSIUM CHLORIDE, CALCIUM CHLORIDE 600; 310; 30; 20 MG/100ML; MG/100ML; MG/100ML; MG/100ML
1000 INJECTION, SOLUTION INTRAVENOUS ONCE
Status: COMPLETED | OUTPATIENT
Start: 2024-06-07 | End: 2024-06-07

## 2024-06-07 RX ADMIN — SODIUM CHLORIDE, POTASSIUM CHLORIDE, SODIUM LACTATE AND CALCIUM CHLORIDE 1000 ML: 600; 310; 30; 20 INJECTION, SOLUTION INTRAVENOUS at 22:30

## 2024-06-07 ASSESSMENT — FIBROSIS 4 INDEX: FIB4 SCORE: 1.81

## 2024-06-08 ENCOUNTER — PHARMACY VISIT (OUTPATIENT)
Dept: PHARMACY | Facility: MEDICAL CENTER | Age: 89
End: 2024-06-08
Payer: COMMERCIAL

## 2024-06-08 VITALS
WEIGHT: 104.72 LBS | TEMPERATURE: 99 F | HEART RATE: 96 BPM | SYSTOLIC BLOOD PRESSURE: 121 MMHG | RESPIRATION RATE: 16 BRPM | DIASTOLIC BLOOD PRESSURE: 58 MMHG | HEIGHT: 59 IN | BODY MASS INDEX: 21.11 KG/M2 | OXYGEN SATURATION: 93 %

## 2024-06-08 PROBLEM — E87.6 HYPOKALEMIA: Status: ACTIVE | Noted: 2024-06-08
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
## 2024-06-08 PROBLEM — R52 INTRACTABLE PAIN: Status: RESOLVED | Noted: 2024-06-08 | Resolved: 2024-06-08

## 2024-06-08 PROBLEM — J96.01 ACUTE RESPIRATORY FAILURE WITH HYPOXIA (HCC): Status: RESOLVED | Noted: 2024-02-23 | Resolved: 2024-06-08

## 2024-06-08 PROBLEM — R52 INTRACTABLE PAIN: Status: ACTIVE | Noted: 2024-06-08

## 2024-06-08 PROBLEM — J18.9 COMMUNITY ACQUIRED PNEUMONIA OF RIGHT LOWER LOBE OF LUNG: Status: ACTIVE | Noted: 2024-06-08

## 2024-06-08 PROBLEM — S22.42XA CLOSED FRACTURE OF MULTIPLE RIBS OF LEFT SIDE: Status: ACTIVE | Noted: 2024-06-08

## 2024-06-08 PROBLEM — I50.22 CHRONIC HFREF (HEART FAILURE WITH REDUCED EJECTION FRACTION) (HCC): Status: ACTIVE | Noted: 2024-06-08

## 2024-06-08 LAB
EKG IMPRESSION: NORMAL
PROCALCITONIN SERPL-MCNC: 0.33 NG/ML
TROPONIN T SERPL-MCNC: 18 NG/L (ref 6–19)

## 2024-06-08 PROCEDURE — RXMED WILLOW AMBULATORY MEDICATION CHARGE: Performed by: STUDENT IN AN ORGANIZED HEALTH CARE EDUCATION/TRAINING PROGRAM

## 2024-06-08 PROCEDURE — 96365 THER/PROPH/DIAG IV INF INIT: CPT

## 2024-06-08 PROCEDURE — 700102 HCHG RX REV CODE 250 W/ 637 OVERRIDE(OP): Performed by: STUDENT IN AN ORGANIZED HEALTH CARE EDUCATION/TRAINING PROGRAM

## 2024-06-08 PROCEDURE — 99239 HOSP IP/OBS DSCHRG MGMT >30: CPT | Performed by: STUDENT IN AN ORGANIZED HEALTH CARE EDUCATION/TRAINING PROGRAM

## 2024-06-08 PROCEDURE — 700111 HCHG RX REV CODE 636 W/ 250 OVERRIDE (IP): Mod: JZ | Performed by: STUDENT IN AN ORGANIZED HEALTH CARE EDUCATION/TRAINING PROGRAM

## 2024-06-08 PROCEDURE — 700101 HCHG RX REV CODE 250: Performed by: STUDENT IN AN ORGANIZED HEALTH CARE EDUCATION/TRAINING PROGRAM

## 2024-06-08 PROCEDURE — 84484 ASSAY OF TROPONIN QUANT: CPT

## 2024-06-08 PROCEDURE — A9270 NON-COVERED ITEM OR SERVICE: HCPCS | Performed by: STUDENT IN AN ORGANIZED HEALTH CARE EDUCATION/TRAINING PROGRAM

## 2024-06-08 PROCEDURE — 770001 HCHG ROOM/CARE - MED/SURG/GYN PRIV*

## 2024-06-08 PROCEDURE — 700117 HCHG RX CONTRAST REV CODE 255: Performed by: STUDENT IN AN ORGANIZED HEALTH CARE EDUCATION/TRAINING PROGRAM

## 2024-06-08 PROCEDURE — 700105 HCHG RX REV CODE 258: Performed by: STUDENT IN AN ORGANIZED HEALTH CARE EDUCATION/TRAINING PROGRAM

## 2024-06-08 PROCEDURE — 36415 COLL VENOUS BLD VENIPUNCTURE: CPT

## 2024-06-08 PROCEDURE — 99223 1ST HOSP IP/OBS HIGH 75: CPT | Mod: AI | Performed by: STUDENT IN AN ORGANIZED HEALTH CARE EDUCATION/TRAINING PROGRAM

## 2024-06-08 RX ORDER — OXYCODONE HYDROCHLORIDE 5 MG/1
5 TABLET ORAL
Status: DISCONTINUED | OUTPATIENT
Start: 2024-06-08 | End: 2024-06-08 | Stop reason: HOSPADM

## 2024-06-08 RX ORDER — POLYETHYLENE GLYCOL 3350 17 G/17G
17 POWDER, FOR SOLUTION ORAL DAILY
Qty: 30 EACH | Refills: 0 | Status: SHIPPED | OUTPATIENT
Start: 2024-06-08

## 2024-06-08 RX ORDER — OXYCODONE HYDROCHLORIDE 5 MG/1
2.5 TABLET ORAL EVERY 6 HOURS PRN
Qty: 10 TABLET | Refills: 0 | Status: SHIPPED | OUTPATIENT
Start: 2024-06-08 | End: 2024-06-13

## 2024-06-08 RX ORDER — MORPHINE SULFATE 4 MG/ML
2 INJECTION INTRAVENOUS
Status: DISCONTINUED | OUTPATIENT
Start: 2024-06-08 | End: 2024-06-08 | Stop reason: HOSPADM

## 2024-06-08 RX ORDER — FUROSEMIDE 10 MG/ML
40 INJECTION INTRAMUSCULAR; INTRAVENOUS
Status: DISCONTINUED | OUTPATIENT
Start: 2024-06-08 | End: 2024-06-08 | Stop reason: HOSPADM

## 2024-06-08 RX ORDER — LORAZEPAM 0.5 MG/1
0.5 TABLET ORAL EVERY 4 HOURS PRN
COMMUNITY

## 2024-06-08 RX ORDER — MORPHINE SULFATE 20 MG/ML
0.25 SOLUTION ORAL
COMMUNITY

## 2024-06-08 RX ORDER — AZITHROMYCIN 500 MG/1
500 TABLET, FILM COATED ORAL DAILY
Qty: 2 TABLET | Refills: 0 | Status: ACTIVE | OUTPATIENT
Start: 2024-06-10 | End: 2024-06-12

## 2024-06-08 RX ORDER — HALOPERIDOL 0.5 MG/1
0.5 TABLET ORAL EVERY 6 HOURS PRN
COMMUNITY

## 2024-06-08 RX ORDER — SENNOSIDES A AND B 8.6 MG/1
8.6 TABLET, FILM COATED ORAL
COMMUNITY

## 2024-06-08 RX ORDER — AZITHROMYCIN 250 MG/1
500 TABLET, FILM COATED ORAL DAILY
Qty: 4 TABLET | Refills: 0 | Status: DISCONTINUED | OUTPATIENT
Start: 2024-06-09 | End: 2024-06-08 | Stop reason: HOSPADM

## 2024-06-08 RX ORDER — AMOXICILLIN AND CLAVULANATE POTASSIUM 875; 125 MG/1; MG/1
1 TABLET, FILM COATED ORAL EVERY 12 HOURS
Qty: 10 TABLET | Refills: 0 | Status: DISCONTINUED | OUTPATIENT
Start: 2024-06-08 | End: 2024-06-08 | Stop reason: HOSPADM

## 2024-06-08 RX ORDER — AMOXICILLIN AND CLAVULANATE POTASSIUM 875; 125 MG/1; MG/1
1 TABLET, FILM COATED ORAL EVERY 12 HOURS
Qty: 10 TABLET | Refills: 0 | Status: ACTIVE | OUTPATIENT
Start: 2024-06-08 | End: 2024-06-13

## 2024-06-08 RX ORDER — POTASSIUM CHLORIDE 20 MEQ/1
40 TABLET, EXTENDED RELEASE ORAL EVERY 4 HOURS
Status: COMPLETED | OUTPATIENT
Start: 2024-06-08 | End: 2024-06-08

## 2024-06-08 RX ORDER — AZITHROMYCIN 500 MG/5ML
500 INJECTION, POWDER, LYOPHILIZED, FOR SOLUTION INTRAVENOUS ONCE
Status: COMPLETED | OUTPATIENT
Start: 2024-06-08 | End: 2024-06-08

## 2024-06-08 RX ORDER — OXYCODONE HYDROCHLORIDE 5 MG/1
2.5 TABLET ORAL
Status: DISCONTINUED | OUTPATIENT
Start: 2024-06-08 | End: 2024-06-08 | Stop reason: HOSPADM

## 2024-06-08 RX ADMIN — WATER 500 MG: 1 INJECTION INTRAMUSCULAR; INTRAVENOUS; SUBCUTANEOUS at 02:28

## 2024-06-08 RX ADMIN — OXYCODONE HYDROCHLORIDE 2.5 MG: 5 TABLET ORAL at 06:30

## 2024-06-08 RX ADMIN — OXYCODONE HYDROCHLORIDE 2.5 MG: 5 TABLET ORAL at 14:13

## 2024-06-08 RX ADMIN — POTASSIUM CHLORIDE 40 MEQ: 1500 TABLET, EXTENDED RELEASE ORAL at 06:30

## 2024-06-08 RX ADMIN — AMPICILLIN AND SULBACTAM 3 G: 1; 2 INJECTION, POWDER, FOR SOLUTION INTRAMUSCULAR; INTRAVENOUS at 00:43

## 2024-06-08 RX ADMIN — IOHEXOL 57 ML: 350 INJECTION, SOLUTION INTRAVENOUS at 00:00

## 2024-06-08 RX ADMIN — AMOXICILLIN AND CLAVULANATE POTASSIUM 1 TABLET: 875; 125 TABLET, FILM COATED ORAL at 15:03

## 2024-06-08 RX ADMIN — FUROSEMIDE 40 MG: 10 INJECTION INTRAMUSCULAR; INTRAVENOUS at 06:30

## 2024-06-08 RX ADMIN — POTASSIUM CHLORIDE 40 MEQ: 1500 TABLET, EXTENDED RELEASE ORAL at 03:46

## 2024-06-08 ASSESSMENT — COGNITIVE AND FUNCTIONAL STATUS - GENERAL
MOVING TO AND FROM BED TO CHAIR: A LOT
WALKING IN HOSPITAL ROOM: A LOT
MOBILITY SCORE: 12
DAILY ACTIVITIY SCORE: 19
TURNING FROM BACK TO SIDE WHILE IN FLAT BAD: A LOT
TOILETING: A LOT
DRESSING REGULAR LOWER BODY CLOTHING: A LITTLE
SUGGESTED CMS G CODE MODIFIER DAILY ACTIVITY: CK
STANDING UP FROM CHAIR USING ARMS: A LOT
CLIMB 3 TO 5 STEPS WITH RAILING: A LOT
MOVING FROM LYING ON BACK TO SITTING ON SIDE OF FLAT BED: A LOT
DRESSING REGULAR UPPER BODY CLOTHING: A LITTLE
SUGGESTED CMS G CODE MODIFIER MOBILITY: CL
HELP NEEDED FOR BATHING: A LITTLE

## 2024-06-08 ASSESSMENT — LIFESTYLE VARIABLES
HOW MANY TIMES IN THE PAST YEAR HAVE YOU HAD 5 OR MORE DRINKS IN A DAY: 0
ON A TYPICAL DAY WHEN YOU DRINK ALCOHOL HOW MANY DRINKS DO YOU HAVE: 0
TOTAL SCORE: 0
EVER HAD A DRINK FIRST THING IN THE MORNING TO STEADY YOUR NERVES TO GET RID OF A HANGOVER: NO
TOTAL SCORE: 0
HAVE YOU EVER FELT YOU SHOULD CUT DOWN ON YOUR DRINKING: NO
ALCOHOL_USE: NO
HAVE PEOPLE ANNOYED YOU BY CRITICIZING YOUR DRINKING: NO
EVER FELT BAD OR GUILTY ABOUT YOUR DRINKING: NO
DOES PATIENT WANT TO STOP DRINKING: NO
CONSUMPTION TOTAL: NEGATIVE
AVERAGE NUMBER OF DAYS PER WEEK YOU HAVE A DRINK CONTAINING ALCOHOL: 0
TOTAL SCORE: 0

## 2024-06-08 ASSESSMENT — PAIN DESCRIPTION - PAIN TYPE
TYPE: ACUTE PAIN

## 2024-06-08 ASSESSMENT — PATIENT HEALTH QUESTIONNAIRE - PHQ9
1. LITTLE INTEREST OR PLEASURE IN DOING THINGS: NOT AT ALL
SUM OF ALL RESPONSES TO PHQ9 QUESTIONS 1 AND 2: 0
2. FEELING DOWN, DEPRESSED, IRRITABLE, OR HOPELESS: NOT AT ALL

## 2024-06-08 ASSESSMENT — ENCOUNTER SYMPTOMS: FALLS: 1

## 2024-06-08 ASSESSMENT — FIBROSIS 4 INDEX: FIB4 SCORE: 3.22

## 2024-06-08 NOTE — ED NOTES
Unable to obtain med rec at this time. Patient is from [facility] assisted living. Contacted facility to request MAR however per staff no one on duty that has access at this time. Staff suggested to call back again after 0600.

## 2024-06-08 NOTE — PROGRESS NOTES
"Pt  admitted to room T404 bed 2 via transport in [facility] from ED at 0150.  Pt  pain reported at 0 on a scale of 0-10 while remaining still, pt unable to give number to pain with movement but grimaces when doing so. Oriented to room call light and smoking policy.  Reviewed plan of care (equipment, incentive spirometer, sequential compression devices, medications, activity, diet, fall precautions, skin care, and pain) with patient and family. Welcome packet given and reviewed with pt , all questions answered. Education provided on oral hygiene program.     /62   Pulse 85   Temp 36.8 °C (98.2 °F) (Temporal)   Resp 16   Ht 1.499 m (4' 11\")   Wt 49.9 kg (110 lb)   SpO2 97%   BMI 22.22 kg/m²        AA&Ox4. Denies CP/SOB.  See 2 RN skin note  Denies N/V.  + void. + BM. Last BM PTA  Pt ambulates max assist, generalized weakness.  All needs met at this time. Call light within reach. Pt calls appropriately. Bed low and locked, non skid socks in place. Hourly rounding in place.  "

## 2024-06-08 NOTE — DISCHARGE PLANNING
Case Management Discharge Planning    Admission Date: 6/7/2024  GMLOS:    ALOS: 0    6-Clicks ADL Score: 19  6-Clicks Mobility Score: 12  PT and/or OT Eval ordered: No  Post-acute Referrals Ordered: Yes  Post-acute Choice Obtained: Yes  Has referral(s) been sent to post-acute provider:  Yes      Anticipated Discharge Dispo: Discharge Disposition: D/T to hospice home (50)    DME Needed: No    Action(s) Taken: Updated Provider/Nurse on Discharge Plan    Pt was discussed in IDT rounds with Dr Burden.   Pt is medically cleared to discharge back to [facility] on Hospice.     This RN CM received  a call from Stephanie of [facility].   Pt is on their service.     Per Stephanie she already contacted the Owner of [facility] and informed that Pt  is discharging today under [facility].   [place] arranged Pt s gurney transport back to the  at 15:30.  Need to send a copy of Discharge Summary with Pt . Informed RUDI Hsieh and Dr Burden.  Copy sent with Pt.    Escalations Completed: None    Medically Clear: Yes    Next Steps:   CM to continue to assist Pt with discharge as needed    Barriers to Discharge: None    Is the patient up for discharge tomorrow: No

## 2024-06-08 NOTE — PROGRESS NOTES
4 Eyes Skin Assessment Completed by RUDI Del Valle and RUDI Barrios.    Head WDL  Ears WDL  Nose WDL  Mouth WDL  Neck WDL  Breast/Chest bruising to L shoulder/clavicle  Shoulder Blades WDL  Spine WDL  (R) Arm/Elbow/Hand bruising  (L) Arm/Elbow/Hand splint in place, LOUISA  Abdomen WDL  Groin WDL  Scrotum/Coccyx/Buttocks WDL  (R) Leg old scar to knee  (L) Leg WDL  (R) Heel/Foot/Toe WDL  (L) Heel/Foot/Toe WDL          Devices In Places Blood Pressure Cuff, Pulse Ox, and Nasal Cannula      Interventions In Place Gray Ear Foams, Pillows, and Dri-Charly Pads    Possible Skin Injury No    Pictures Uploaded Into Epic N/A  Wound Consult Placed N/A  RN Wound Prevention Protocol Ordered No

## 2024-06-08 NOTE — ED NOTES
Med Rec complete per medication list report from [facility]   Allergies reviewed  Antibiotics in the past 30 days:no  Anticoagulant in past 14 days:no  Pharmacy patient utilizes[facility]    Pt is Hospice. Pt Lives in [facility]. Per staff pt uses [facility]

## 2024-06-08 NOTE — THERAPY
Physical Therapy Contact Note    Patient Name: Lizz Saldana  Age:  89 y.o., Sex:  female  Medical Record #: [id number]  Today's Date: 6/8/2024    PT eval order received. Pt living in [facility] with hospice care. Pt scheduled to return back home today. Cancel PT eval as pt with care and mobility needs met with group home. Discussed with RN.

## 2024-06-08 NOTE — PROGRESS NOTES
Patient discharged to [facility] via [place] transport. Friend Bertin present for transport arrival. Patient states she has all belongings, friend Bertin took patient's ring home. Discharge paperwork and meds to beds reviewed with patient and friend, all questions answered. IV removed. All needs met at this time.

## 2024-06-08 NOTE — ED NOTES
[facility] [address]    [address] [address]    Phone:  [phone]    Fax:  [phone]       Thank You for choosing us for your health care visit. We are glad to serve you and happy to provide you with this summary of your visit. Please help us to ensure we have accurate records. If you find anything that needs to be changed, please let our staff know as soon as possible.          Your Demographic Information     Patient Name Sex     Les Rogers Male 1965       Ethnic Group Patient Race    Not of  or  Origin White      Your Visit Details     Date & Time Provider Department    2017 3:45 PM [facility] PODIATRY NURSE [facility]      Your To Do List     Follow-Up    Return if symptoms worsen or fail to improve.      We Ordered or Performed the Following     CUSTOM FOOT INSERT REPAIR U 25 EACH       Conditions Discussed Today or Order-Related Diagnoses        Comments    Pronation deformity of both feet    -  Primary       Your Vitals Were     Smoking Status                   Never Smoker           Medications Prescribed or Re-Ordered Today     None      Your Current Medications Are        Disp Refills Start End    lisinopril (ZESTRIL) 10 MG tablet 90 tablet 1 2017     Sig - Route: Take 1 tablet by mouth daily. - Oral    Class: Eprescribe    tadalafil (CIALIS) 10 MG tablet 10 tablet 5 2017     Sig - Route: Take 1 tablet by mouth as needed for Erectile Dysfunction. - Oral    Class: Eprescribe    fluticasone (FLONASE) 50 MCG/ACT nasal spray 48 g 3 2016     Sig - Route: Spray 1 spray in each nostril daily. - Nasal    Class: Eprescribe      Allergies     Ciprofloxacin GI UPSET, NAUSEA, PRURITUS    Abdominal pain      Immunizations History as of 2017     Name Date    Influenza 2015, 10/5/2012    Influenza Quadrivalent Preservative Free 10/31/2016  3:36 PM    Td:Adult type  Patient transported to imaging   tetanus/diphtheria 11/15/2002, 10/10/2002    Tdap 7/6/2011      Problem List as of 5/30/2017     Back pain    Hernia    Sinusitis    Seasonal allergies    Benign essential HTN            Patient Instructions     None

## 2024-06-08 NOTE — ED TRIAGE NOTES
"Chief Complaint   Patient presents with    Arm Swelling     BIBA from [facility]. Per pt's caregiver, pt fell on Monday and was evaluated here. Pt found to have left clavicle fracture.  Pt discharged.  On Tuesday, pt had another fall where she landed on her left side and did not get evaluated for it.  -head strike. Today pt's left hand started to swell and turn purple. EMS has to cut two rings off of her fingers.  Significant swelling present to left arm and shoulder.       /65   Pulse (!) 106   Temp 37.6 °C (99.6 °F) (Temporal)   Resp 14   Ht 1.499 m (4' 11\")   Wt 49.9 kg (110 lb)   SpO2 92%   BMI 22.22 kg/m²     Pt's caregiver at bedside. Pt has POLST with DNR/DNI request.  Pt changed into gown and connected to monitor. Call light within reach.   "

## 2024-06-08 NOTE — DISCHARGE SUMMARY
Discharge Summary    CHIEF COMPLAINT ON ADMISSION  Chief Complaint   Patient presents with    Arm Swelling     BIBA from [facility]. Per pt's caregiver, pt fell on Monday and was evaluated here. Pt found to have left clavicle fracture.  Pt discharged.  On Tuesday, pt had another fall where she landed on her left side and did not get evaluated for it.  -head strike. Today pt's left hand started to swell and turn purple. EMS has to cut two rings off of her fingers.  Significant swelling present to left arm and shoulder.         Reason for Admission  Acute respiratory failure with hypoxia due to right lower lobe community-acquired pneumonia from rib fractures.    Admission Date  6/8/2024    CODE STATUS  DNAR/DNI    HPI & HOSPITAL COURSE  Lizz Saldana is a 89 y.o. female who presented on 6/7/2024 with increasing ground-level falls.  This is a pleasant woman with a history of HFrEF 25 to 30%, hypertension, breast cancer status post left sided mastectomy, hyperlipidemia on hospice.  She is a resident of [facility] by her friend and caregiver.  Patient had a recent ground-level fall resulting in a left-sided clavicle fracture few days prior.  Patient was noted to have a left clavicle fracture and was discharged to home.  On the subsequent day, she had another fall landing on her left side resulting in significant left hand swelling and later becoming purple in the next couple days.  EMS was called and code patient's 2 rings from her fingers.  She was noted to have significant swelling of the left arm and shoulder.  Per report, patient did not strike her head.  She was complaining of mild low back pain.  She was brought to the emergency room for further care and evaluation.    In the emergency room, head CT as well as cervical spine CT showed no evidence of acute fracture or intracranial findings.  At bedside, she was noted to have hypoxia with SPO2 in the low 80s requiring 2 LPM oxygen by  nasal cannula.  Due to her acute acute respiratory failure hypoxia, a CT angiogram of the chest was performed, which revealed 2 posterior rib fractures and small infiltrates bilaterally with bibasilar lung consistent with pneumonia likely secondary to splinting from recent rib fractures.  She was started on IV Unasyn and azithromycin.  Patient's procalcitonin was elevated 0.33.  White count elevated at 12.0.    NT-proBNP was elevated at 2475 above her previous level 1856 in February 2024. She was given furosemide 40 mg IV once and started on oxycodone.  She was able to wean off supplemental oxygen to room air.  Patient was resumed on home furosemide 40 mg by mouth daily.    Therefore, she is discharged in good and stable condition to home with close outpatient follow-up.    The patient recovered much more quickly than anticipated on admission.    Discharge Date  6/8/2024    FOLLOW UP ITEMS POST DISCHARGE  -As per [facility] services.  -Follow-up with primary care provider in 3 to 5 days.      DISCHARGE DIAGNOSES  Principal Problem:    Community acquired pneumonia of right lower lobe of lung (POA: Yes)  Active Problems:    HTN (hypertension), benign (POA: Yes)      Overview: Patient is currently taking losartan 12.5 mg, spironolactone 12.5 mg, and       Lasix 40 mg daily.      Blood pressure in office today within goal.  Patient is scheduled to see       cardiology as her recent echocardiogram from her recent hospital admission       showed EF of 30%.    ACP (advance care planning) (POA: Yes)    Age-related physical debility (POA: Yes)      Overview: Patient is currently sitting in a wheelchair which is hers and her       's.    ACC/AHA stage D systolic heart failure (HCC) (POA: Yes)    Closed fracture of multiple ribs of left side (POA: Yes)    Hypokalemia (POA: Yes)    Chronic HFrEF (heart failure with reduced ejection fraction) (HCC) (POA: Unknown)    Ground-level fall (POA: Yes)  Resolved Problems:     Hypoxia (POA: Yes)    Intractable pain (POA: Yes)      FOLLOW UP  No future appointments.  KEANU  [address]  [place] [address]  [phone]  Follow up      TREVA Carranza  [address]  [place] [id number] [phone]    Follow up in 3 day(s)        MEDICATIONS ON DISCHARGE     Medication List        START taking these medications        Instructions   amoxicillin-clavulanate 875-125 MG Tabs  Commonly known as: Augmentin   Take 1 Tablet by mouth every 12 hours for 5 days.  Dose: 1 Tablet     azithromycin 500 MG tablet  Start taking on: Yuliana 10, 2024  Commonly known as: Zithromax   Take 1 Tablet by mouth every day for 2 days.  Dose: 500 mg     oxyCODONE immediate-release 5 MG Tabs  Commonly known as: Roxicodone   Take 0.5 Tablets by mouth every 6 hours as needed for Severe Pain for up to 5 days.  Dose: 2.5 mg     polyethylene glycol/lytes Pack  Commonly known as: Miralax   Take 1 Packet by mouth every day. To prevent constipation while taking narcotic pain medications.  Dose: 17 g            CONTINUE taking these medications        Instructions   acetaminophen 325 MG Tabs  Commonly known as: Tylenol   Take 2 Tablets by mouth every 6 hours as needed for Mild Pain or Fever.  Dose: 650 mg     furosemide 40 MG Tabs  Commonly known as: Lasix   Take 1 Tablet by mouth every day.  Dose: 40 mg     haloperidol 0.5 MG Tabs  Commonly known as: Haldol   Take 0.5 mg by mouth every 6 hours as needed.  Dose: 0.5 mg     LORazepam 0.5 MG Tabs  Commonly known as: Ativan   Take 0.5 mg by mouth every four hours as needed for Anxiety.  Dose: 0.5 mg     losartan 25 MG Tabs  Commonly known as: Cozaar   Take 0.5 Tablets by mouth every day.  Dose: 12.5 mg     morphine 100 MG/5ML Soln   Take 0.25 mL by mouth every 2 hours as needed (shortness of breath).  Dose: 0.25 mL     sennosides 8.6 MG Tabs  Commonly known as: Senokot   Take 8.6 mg by mouth 1 time a day as needed.  Dose: 8.6 mg               Allergies  Allergies   Allergen Reactions    Codeine Nausea    Floxin [Ofloxacin] Itching and Swelling    Lidoderm      rash    Methylprednisolone Sodium Succ Rash and Itching     Painful skin blistering LE    Toradol Unspecified     .       DIET  Orders Placed This Encounter   Procedures    Diet Order Diet: Consistent CHO (Diabetic)     Standing Status:   Standing     Number of Occurrences:   1     Order Specific Question:   Diet:     Answer:   Consistent CHO (Diabetic) [4]       ACTIVITY  As tolerated.  Weight bearing as tolerated    CONSULTATIONS  None    PROCEDURES  None    LABORATORY  Lab Results   Component Value Date    SODIUM 133 (L) 06/07/2024    POTASSIUM 3.5 (L) 06/07/2024    CHLORIDE 94 (L) 06/07/2024    CO2 23 06/07/2024    GLUCOSE 132 (H) 06/07/2024    BUN 34 (H) 06/07/2024    CREATININE 0.71 06/07/2024    CREATININE 0.81 02/17/2010    GLOMRATE >59 02/17/2010        Lab Results   Component Value Date    WBC 12.0 (H) 06/07/2024    WBC 6.8 02/17/2010    HEMOGLOBIN 13.7 06/07/2024    HEMATOCRIT 39.7 06/07/2024    PLATELETCT 224 06/07/2024        Total time of the discharge process 33 minutes.

## 2024-06-08 NOTE — ED PROVIDER NOTES
ED Provider Note    CHIEF COMPLAINT  Chief Complaint   Patient presents with    Arm Swelling     BIBA from [facility]. Per pt's caregiver, pt fell on Monday and was evaluated here. Pt found to have left clavicle fracture.  Pt discharged.  On Tuesday, pt had another fall where she landed on her left side and did not get evaluated for it.  -head strike. Today pt's left hand started to swell and turn purple. EMS has to cut two rings off of her fingers.  Significant swelling present to left arm and shoulder.         EXTERNAL RECORDS REVIEWED  Reviewed inpatient admission discharge summary 2/26/2024 admitted for  heart failure exacerbation  Reviewed internal emergency department note on 6/3/2024, seen for a ground-level fall diagnosed with a left clavicle fracture    HPI/ROS  LIMITATION TO HISTORY   Select: : None  OUTSIDE HISTORIAN(S):  Patient's caregiver/friend providing clinically relevant collateral history    Lizz Saldana is a 89 y.o. female with past medical history of hypertension hyperlipidemia breast cancer status post left-sided mastectomy presenting to the emergency department after a fall.  Patient accompanied from [facility] by her friend/caregiver.  Patient reportedly fell on Monday, was seen in the emergency department diagnosed with a left-sided clavicle fracture.  Patient reportedly had a second fall on Tuesday landed on her left side.  Has been having mild lower neck pain.  Today was found to have marked swelling to the left hand so she was brought to the emergency department for evaluation.  Of note EMS had to cut off 2 rings on her fingers prior to arrival in the emergency department.    In the emergency department now, patient says that she did not strike her head but does have lower neck pain after the second fall.  She was not seen in the emergency department on Tuesday after the second fall.  She says that she fell because she has had challenging mobility and poor  balance that has been ongoing for quite some time.  Denies any chest pain shortness of breath palpitations abdominal pain nausea vomiting dysuria or flank pain.    PAST MEDICAL HISTORY   has a past medical history of Arthritis (2015), Bilateral cataracts, Cancer (HCC) (1990), Closed fracture of distal end of right radius (10/24/2022), DJD (degenerative joint disease), Encounter to establish care (08/29/2023), High cholesterol, HTN, breast cancer, Hyperlipidemia, Osteoporosis, Pain (07/2015), Pigmented skin lesion (04/30/2015), Plantar wart, right foot, Postherpetic neuralgia, and Tubular adenoma.    SURGICAL HISTORY   has a past surgical history that includes rotator cuff repair (1990); cataract extraction with iol (2000); cyst excision (Left, 1989); mastectomy (1990); hysterectomy, total abdominal (1970); knee arthroplasty total (Right, 7/13/2015); and knee manipulation (Right, 10/12/2015).    FAMILY HISTORY  Family History   Problem Relation Age of Onset    Hypertension Mother     Other Father     Other Sister         alzheimer    Cancer Neg Hx     Diabetes Neg Hx     Heart Disease Neg Hx     Hyperlipidemia Neg Hx     Stroke Neg Hx        SOCIAL HISTORY  Social History     Tobacco Use    Smoking status: Never    Smokeless tobacco: Never   Vaping Use    Vaping status: Never Used   Substance and Sexual Activity    Alcohol use: No     Alcohol/week: 0.0 oz    Drug use: No    Sexual activity: Never     Partners: Male       CURRENT MEDICATIONS  Home Medications       Reviewed by Bg Orozco (Pharmacy Tech) on 06/08/24 at 0111  Med List Status: Unable to Obtain     Medication Last Dose Status   acetaminophen (TYLENOL) 325 MG Tab  Active   furosemide (LASIX) 40 MG Tab  Active   Home Care Oxygen  Active   losartan (COZAAR) 25 MG Tab  Active                  Audit from Redirected Encounters    **Home medications have not yet been reviewed for this encounter**         ALLERGIES  Allergies   Allergen Reactions     "Codeine Nausea    Floxin [Ofloxacin] Itching and Swelling    Lidoderm      rash    Methylprednisolone Sodium Succ Rash and Itching     Painful skin blistering LE    Toradol Unspecified     .       PHYSICAL EXAM  VITAL SIGNS: BP (!) 156/121   Pulse 95   Temp 37.6 °C (99.6 °F) (Temporal)   Resp 16   Ht 1.499 m (4' 11\")   Wt 49.9 kg (110 lb)   SpO2 94%   BMI 22.22 kg/m²    General: Frail appearing, non-toxic, no acute distress  Neuro: GCS 15, moving all extremities  HEENT:   - Head: Normocephalic, atraumatic  - Eyes: PERRL, no periorbital ecchymosis  - Midface: Atraumatic and stable  - Ears/Nose: normal external nose and ears, no retroauricular ecchymosis  - Mouth: moist mucosal membranes, atraumatic  Neck: No midline tenderness palpation, full range of motion in lateral rotation, flexion, extension  Chest: Left upper chest ecchymosis, tenderness to palpation of the clavicle but no tenderness to the ribs.  Resp: clear to auscultation, no increased work of breathing  CV: Regular rate and rhythm, perfusing well  Abd: Soft, non-tender, non-distended  Pelvis: Stable on anterior posterior compression  Extremities:   RUE: Atraumatic, nontender to palpation, 2+ radial pulse, SILT, strength intact  LUE: Swelling to the wrist and left hand.  She has a strong brachial and radial pulse on the left.  AIN, PIN, ulnar motor distribution intact.  Sensation intact to light touch in all aspects of the hand.  RLE: Atraumatic, nontender to palpation, 2+ DP pulse, SILT, strength intact  LLE: Atraumatic, nontender to palpation, 2+ DP pulse, SILT, strength intact  Back: Atraumatic, no midline tenderness palpation    DIAGNOSTIC STUDIES / PROCEDURES    EKG  My independent EKG interpretation:  Results for orders placed or performed during the hospital encounter of 06/07/24   EKG   Result Value Ref Range    Report       [facility] Emergency Dept.    Test Date:  2024-06-07  Pt Name:    MIREILLE CHAPA                 " Department: ER  MRN:        [id number]                      Room:       [facility]  Gender:     Female                       Technician: [id number]  :        [id number]                   Requested By:ER TRIAGE PROTOCOL  Order #:    [id number]                    Reading MD: Saad Perry    Measurements  Intervals                                Axis  Rate:       102                          P:          87  OK:         163                          QRS:        12  QRSD:       144                          T:          41  QT:         387  QTc:        505    Interpretive Statements  Sinus tachycardia  Multiform ventricular premature complexes  Probable left atrial enlargement  Left ventricular hypertrophy  LBBB  No acute ST or T changes  Electronically Signed On 2024 01:25:53 PDT by Saad Perry         LABS  Results for orders placed or performed during the hospital encounter of 24   CBC WITH DIFFERENTIAL   Result Value Ref Range    WBC 12.0 (H) 4.8 - 10.8 K/uL    RBC 4.51 4.20 - 5.40 M/uL    Hemoglobin 13.7 12.0 - 16.0 g/dL    Hematocrit 39.7 37.0 - 47.0 %    MCV 88.0 81.4 - 97.8 fL    MCH 30.4 27.0 - 33.0 pg    MCHC 34.5 32.2 - 35.5 g/dL    RDW 47.5 35.9 - 50.0 fL    Platelet Count 224 164 - 446 K/uL    MPV 10.2 9.0 - 12.9 fL    Neutrophils-Polys 71.00 44.00 - 72.00 %    Lymphocytes 16.10 (L) 22.00 - 41.00 %    Monocytes 12.00 0.00 - 13.40 %    Eosinophils 0.20 0.00 - 6.90 %    Basophils 0.20 0.00 - 1.80 %    Immature Granulocytes 0.50 0.00 - 0.90 %    Nucleated RBC 0.00 0.00 - 0.20 /100 WBC    Neutrophils (Absolute) 8.53 (H) 1.82 - 7.42 K/uL    Lymphs (Absolute) 1.94 1.00 - 4.80 K/uL    Monos (Absolute) 1.44 (H) 0.00 - 0.85 K/uL    Eos (Absolute) 0.02 0.00 - 0.51 K/uL    Baso (Absolute) 0.03 0.00 - 0.12 K/uL    Immature Granulocytes (abs) 0.06 0.00 - 0.11 K/uL    NRBC (Absolute) 0.00 K/uL   COMP METABOLIC PANEL   Result Value Ref Range    Sodium 133 (L) 135 - 145 mmol/L    Potassium 3.5 (L) 3.6 - 5.5 mmol/L     Chloride 94 (L) 96 - 112 mmol/L    Co2 23 20 - 33 mmol/L    Anion Gap 16.0 7.0 - 16.0    Glucose 132 (H) 65 - 99 mg/dL    Bun 34 (H) 8 - 22 mg/dL    Creatinine 0.71 0.50 - 1.40 mg/dL    Calcium 8.2 (L) 8.5 - 10.5 mg/dL    Correct Calcium 8.8 8.5 - 10.5 mg/dL    AST(SGOT) 36 12 - 45 U/L    ALT(SGPT) 23 2 - 50 U/L    Alkaline Phosphatase 131 (H) 30 - 99 U/L    Total Bilirubin 2.3 (H) 0.1 - 1.5 mg/dL    Albumin 3.3 3.2 - 4.9 g/dL    Total Protein 6.6 6.0 - 8.2 g/dL    Globulin 3.3 1.9 - 3.5 g/dL    A-G Ratio 1.0 g/dL   TROPONIN   Result Value Ref Range    Troponin T 22 (H) 6 - 19 ng/L   URINALYSIS (UA)    Specimen: Urine   Result Value Ref Range    Color Yellow     Character Clear     Specific Gravity 1.020 <1.035    Ph 6.0 5.0 - 8.0    Glucose Negative Negative mg/dL    Ketones Negative Negative mg/dL    Protein Negative Negative mg/dL    Bilirubin Negative Negative    Urobilinogen, Urine 0.2 Negative    Nitrite Negative Negative    Leukocyte Esterase Negative Negative    Occult Blood Trace (A) Negative    Micro Urine Req Microscopic    ESTIMATED GFR   Result Value Ref Range    GFR (CKD-EPI) 77 >60 mL/min/1.73 m 2   URINE MICROSCOPIC (W/UA)   Result Value Ref Range    WBC 0-2 /hpf    RBC 2-5 (A) /hpf    Bacteria Negative None /hpf    Epithelial Cells Negative /hpf    Hyaline Cast 0-2 /lpf   proBrain Natriuretic Peptide, NT   Result Value Ref Range    NT-proBNP 2475 (H) 0 - 125 pg/mL   EKG   Result Value Ref Range    Report       [facility] Emergency Dept.    Test Date:  2024  Pt Name:    MIREILLE CHAPA                 Department: ER  MRN:        [id number]                      Room:       [facility]  Gender:     Female                       Technician: [id number]  :        [id number]                   Requested By:ER TRIAGE PROTOCOL  Order #:    [id number]                    Hakeem MD: Saad Perry    Measurements  Intervals                                Axis  Rate:       102                           P:          87  FL:         163                          QRS:        12  QRSD:       144                          T:          41  QT:         387  QTc:        505    Interpretive Statements  Sinus tachycardia  Multiform ventricular premature complexes  Probable left atrial enlargement  Left ventricular hypertrophy  LBBB  No acute ST or T changes  Electronically Signed On 06- 01:25:53 PDT by Saad Perry         RADIOLOGY  I have independently interpreted the diagnostic imaging associated with this visit and am waiting the final reading from the radiologist.   My preliminary interpretation is as follows:   - Reviewed CT pulmonary angiogram which shows no evidence of acute PE, does show bibasilar infiltrates, left posterior rib fractures  Radiologist interpretation:   CT-CTA CHEST PULMONARY ARTERY W/ RECONS   Final Result         1.  No pulmonary embolus appreciated.   2.  Left posterior seventh and eighth rib fractures   3.  Left clavicular neck fracture   4.  Cardiomegaly   5.  Hepatomegaly   6.  Atherosclerosis and atherosclerotic coronary artery disease.      DX-HAND 3+ LEFT   Final Result         1.  No acute traumatic bony injury.   2.  Corticated bony fragment adjacent to the ulnar styloid, appearance suggesting remote injury.      DX-WRIST-COMPLETE 3+ LEFT   Final Result         1.  No acute traumatic bony injury.   2.  Corticated bony fragment adjacent to the ulnar styloid, appearance suggesting remote injury.      DX-CHEST-PORTABLE (1 VIEW)   Final Result         1.  Bilateral basilar atelectasis, no focal infiltrate   2.  Trace bilateral pleural effusions   3.  Cardiomegaly   4.  Atherosclerosis      CT-CSPINE WITHOUT PLUS RECONS   Final Result      No cervical spine fracture detected.      CT-HEAD W/O   Final Result      1.  No acute intracranial abnormality detected.                 MEDICAL DECISION MAKING      ED COURSE AND PLAN    Lizz Saldana is a 89 y.o. female presenting to the emergency  department for multiple falls.  Patient had 2 falls in the last week, after the first she was seen in the emergency department was diagnosed with a clavicle fracture ultimately discharged home.  She fell the next day was not seen by a physician.  Today her caregiver noticed swelling to the left wrist left hands.  She decided to bring her into the emergency department.  On arrival to the emergency department, patient has tenderness to the left wrist and hand.  Considered possible vascular injury however patient has a robust radial pulse, sensation is intact to light touch throughout the hand her neurologic function otherwise intact she has isolated swelling tenderness to the wrist and hand.  I obtained a plain film of the hand and wrist, considered but no indication for CT angiogram of the left upper extremity.  Plain film of the wrist shows a small step-off on the distal radius articular surface, old appearing calcification about the ulnar styloid from a prior fracture.  Plain film the hand shows no obvious fracture.  Patient was placed in a sugar-tong splint for distal radius fracture.  No indication for reduction as the fracture is well aligned nondisplaced.    While in the emergency department, patient underwent a CT of the head and cervical spine which shows no evidence of an acute fracture or intracranial hemorrhage.  She was noted by bedside nurse to have an oxygen saturation down to the low 80s on room air so she was started on 2 L oxygen.  Chest x-ray shows no obvious pulmonary infiltrate rib fracture or pneumothorax however consider possible PE as the cause of her multiple falls so I obtained a CT pulm angiogram of the chest which showed 2 posterior rib fractures and small infiltrates bilateral bibasilar lungs consistent with pneumonia likely secondary to splinting from recent rib fractures.  Patient was treated with IV Unasyn and azithromycin    Not septic, no indication for IV fluids.    On reevaluation  patient's caregiver would like her to be admitted for pain control, symptom control, antibiotics.  Patient is amenable to admission at this time.  Discussed with Dr. Mills for admission    ---Pertinent ED Course---:    8:23 PM I reviewed the patient's old records in Epic, medication list, allergies, past medical history and performed a physical examination.           Hydration: Based on the patient's presentation of Dehydration and Tachycardia the patient was given IV fluids. IV Hydration was used because oral hydration was not adequate alone. Upon recheck following hydration, the patient was stable.    Procedures:      ----------------------------------------------------------------------------------  DISCUSSIONS    I have discussed management of the patient with the following physicians and SISSY's: Admitting hospitalist    Discussion of management with other \[facility]\"" or appropriate source(s):     Escalation of care considered, and ultimately not performed: Consider discussion with trauma team however Patient only has 2 rib fractures, no indication for trauma evaluation for less than 3 rib fracture    Barriers to care at this time, including but not limited to:     Decision tools and prescription drugs considered including, but not limited to: Antibiotic utilization for pneumonia    FINAL IMPRESSION    1. Multiple falls    2. Pneumonia of both lower lobes due to infectious organism    3. Closed fracture of multiple ribs of left side, initial encounter    4. Hypoxia        New Prescriptions    No medications on file         DISPOSITION    Admission: The patient will be admitted for further evaluation and treatment. Discussed case with consultants and relayed all necessary information.        This chart was dictated using an electronic voice recognition software. The chart has been reviewed and edited but there is still possibility for dictation errors due to limitation of software.    Saad Perry,  6/7/2024

## 2024-06-08 NOTE — H&P
Hospital Medicine History & Physical Note    Date of Service  6/8/2024    Primary Care Physician  YOLI Carranza.    Consultants  None     Code Status  DNAR/DNI    Chief Complaint  Chief Complaint   Patient presents with    Arm Swelling     BIBA from [facility] home. Per pt's caregiver, pt fell on Monday and was evaluated here. Pt found to have left clavicle fracture.  Pt discharged.  On Tuesday, pt had another fall where she landed on her left side and did not get evaluated for it.  -head strike. Today pt's left hand started to swell and turn purple. EMS has to cut two rings off of her fingers.  Significant swelling present to left arm and shoulder.         History of Presenting Illness  Lizz Saldana is a 89 y.o. female past medical history of hypertension, hyperlipidemia, on hospice care who presented 6/7/2024 with increased falls.  Patient had recent left-sided clavicle fracture on Monday.  In the ER, patient had CTA of the chest negative for PE did show a left posterior seventh and eighth rib fractures.  Left hand fractures revealed remote injury.  CT C-spine negative.  CT head negative for acute intracranial abnormality.    Admitted to medicine service.    I discussed the plan of care with patient.    Review of Systems  Review of Systems   Cardiovascular:  Positive for chest pain.   Musculoskeletal:  Positive for falls and joint pain.       Past Medical History   has a past medical history of Arthritis (2015), Bilateral cataracts, Cancer (HCC) (1990), Closed fracture of distal end of right radius (10/24/2022), DJD (degenerative joint disease), Encounter to establish care (08/29/2023), High cholesterol, HTN, breast cancer, Hyperlipidemia, Osteoporosis, Pain (07/2015), Pigmented skin lesion (04/30/2015), Plantar wart, right foot, Postherpetic neuralgia, and Tubular adenoma.    Surgical History   has a past surgical history that includes rotator cuff repair (1990); cataract extraction with  iol (2000); cyst excision (Left, 1989); mastectomy (1990); hysterectomy, total abdominal (1970); knee arthroplasty total (Right, 7/13/2015); and knee manipulation (Right, 10/12/2015).     Family History  family history includes Hypertension in her mother; Other in her father and sister.   Family history reviewed with patient. There is no family history that is pertinent to the chief complaint.     Social History   reports that she has never smoked. She has never used smokeless tobacco. She reports that she does not drink alcohol and does not use drugs.    Allergies  Allergies   Allergen Reactions    Codeine Nausea    Floxin [Ofloxacin] Itching and Swelling    Lidoderm      rash    Methylprednisolone Sodium Succ Rash and Itching     Painful skin blistering LE    Toradol Unspecified     .       Medications  Prior to Admission Medications   Prescriptions Last Dose Informant Patient Reported? Taking?   Home Care Oxygen   Yes No   Sig: Inhale 1 L/min continuous. DME [employer]  O2 LPM @: 1 LPM  Type of O2: Gas  Oxygen via: N/C  Specify Usage: Continuous  Oxygen Modality: Concentrator and Portable Tank  Humidification: Yes   acetaminophen (TYLENOL) 325 MG Tab   No No   Sig: Take 2 Tablets by mouth every 6 hours as needed for Mild Pain or Fever.   furosemide (LASIX) 40 MG Tab   No No   Sig: Take 1 Tablet by mouth every day.   losartan (COZAAR) 25 MG Tab   No No   Sig: Take 0.5 Tablets by mouth every day.      Facility-Administered Medications: None       Physical Exam  Temp:  [37.6 °C (99.6 °F)] 37.6 °C (99.6 °F)  Pulse:  [] 95  Resp:  [14-20] 16  BP: (105-156)/() 156/121  SpO2:  [80 %-98 %] 94 %  Blood Pressure : (!) 156/121   Temperature: 37.6 °C (99.6 °F)   Pulse: 95   Respiration: 16   Pulse Oximetry: 94 %       Physical Exam  Vitals and nursing note reviewed.   Constitutional:       Appearance: Normal appearance.   HENT:      Head: Normocephalic and atraumatic.      Right Ear: Tympanic membrane normal.       Left Ear: Tympanic membrane normal.      Nose: Nose normal.      Mouth/Throat:      Mouth: Mucous membranes are moist.      Pharynx: Oropharynx is clear.   Eyes:      Extraocular Movements: Extraocular movements intact.      Pupils: Pupils are equal, round, and reactive to light.   Cardiovascular:      Rate and Rhythm: Normal rate and regular rhythm.      Pulses: Normal pulses.      Heart sounds: Normal heart sounds.   Pulmonary:      Effort: Pulmonary effort is normal.      Breath sounds: Normal breath sounds.   Abdominal:      General: Bowel sounds are normal. There is no distension.      Palpations: Abdomen is soft. There is no mass.   Musculoskeletal:      Cervical back: Neck supple.      Comments: Left forearm cast in place   Skin:     General: Skin is warm.      Capillary Refill: Capillary refill takes less than 2 seconds.   Neurological:      General: No focal deficit present.      Mental Status: She is alert and oriented to person, place, and time. Mental status is at baseline.   Psychiatric:         Mood and Affect: Mood normal.         Behavior: Behavior normal.         Laboratory:  Recent Labs     06/07/24 1958   WBC 12.0*   RBC 4.51   HEMOGLOBIN 13.7   HEMATOCRIT 39.7   MCV 88.0   MCH 30.4   MCHC 34.5   RDW 47.5   PLATELETCT 224   MPV 10.2     Recent Labs     06/07/24 1958   SODIUM 133*   POTASSIUM 3.5*   CHLORIDE 94*   CO2 23   GLUCOSE 132*   BUN 34*   CREATININE 0.71   CALCIUM 8.2*     Recent Labs     06/07/24 1958   ALTSGPT 23   ASTSGOT 36   ALKPHOSPHAT 131*   TBILIRUBIN 2.3*   GLUCOSE 132*         Recent Labs     06/07/24 1958   NTPROBNP 2475*         Recent Labs     06/07/24 1958   TROPONINT 22*       Imaging:  CT-CTA CHEST PULMONARY ARTERY W/ RECONS   Final Result         1.  No pulmonary embolus appreciated.   2.  Left posterior seventh and eighth rib fractures   3.  Left clavicular neck fracture   4.  Cardiomegaly   5.  Hepatomegaly   6.  Atherosclerosis and atherosclerotic coronary artery  disease.      DX-HAND 3+ LEFT   Final Result         1.  No acute traumatic bony injury.   2.  Corticated bony fragment adjacent to the ulnar styloid, appearance suggesting remote injury.      DX-WRIST-COMPLETE 3+ LEFT   Final Result         1.  No acute traumatic bony injury.   2.  Corticated bony fragment adjacent to the ulnar styloid, appearance suggesting remote injury.      DX-CHEST-PORTABLE (1 VIEW)   Final Result         1.  Bilateral basilar atelectasis, no focal infiltrate   2.  Trace bilateral pleural effusions   3.  Cardiomegaly   4.  Atherosclerosis      CT-CSPINE WITHOUT PLUS RECONS   Final Result      No cervical spine fracture detected.      CT-HEAD W/O   Final Result      1.  No acute intracranial abnormality detected.             X-Ray:  I have personally reviewed the images and compared with prior images.    Assessment/Plan:  Justification for Admission Status  I anticipate this patient will require at least two midnights for appropriate medical management, necessitating inpatient admission because increased falls and intractable pain due to left posterior seventh and eight rib fracture.    Patient will need a Med/Surg bed on MEDICAL service .  The need is secondary to see above.    * Intractable pain- (present on admission)  Assessment & Plan  Pain control, bowel regimen     Hypokalemia  Assessment & Plan  Replete     Closed fracture of multiple ribs of left side  Assessment & Plan  Left posterior seventh and eighth rib fractures   Pain control  Encourage ISS      ACP (advance care planning)- (present on admission)  Assessment & Plan  Patient is on hospice at home.   Will place DNR/DNI at this time  Case management     HTN (hypertension), benign- (present on admission)  Assessment & Plan  Continue with losartan   IV lasix         VTE prophylaxis: SCDs/TEDs

## 2024-06-08 NOTE — PROGRESS NOTES
Bedside report received.  Assessment complete.  A&O x 3-4, intermittently disoriented to time. Patient calls appropriately.  Patient ambulates with x2 assist. Bed alarm on.   Patient has 0/10 pain. Patient medicated per MAR.  Denies N&V. Tolerating CHO diet.  L arm splint CDI  + void, - flatus, - BM.  Patient denies SOB.  SCD's refused.  Review plan with of care with patient. Call light and personal belongings within reach. Hourly rounding in place. All needs met at this time.

## 2024-06-08 NOTE — CARE PLAN
The patient is Stable - Low risk of patient condition declining or worsening    Shift Goals  Clinical Goals: safety, ABX, wean O2, pain management  Patient Goals: wean O2, pain mangement, rest  Family Goals: n/a    Progress made toward(s) clinical / shift goals:  Pt admitted to floor, medicated per Mar, resting. Purewick in place. Pt pivot to bed from [facility], very weak in lower extremities. 2L NC on patient, continuing to monitor. ABX in place. Pt educated on safety and bed alarm in place. Updated pt on POC. Care ongoing.    Patient is not progressing towards the following goals:

## 2024-06-12 PROBLEM — R09.02 HYPOXIA: Status: RESOLVED | Noted: 2024-02-23 | Resolved: 2024-06-08

## 2024-06-13 NOTE — DOCUMENTATION QUERY
[facility]                                                                       Query Response Note      PATIENT:               MIREILLE CHAPA  ACCT #:                  [id number]  MRN:                     [id number]  :                      1927  ADMIT DATE:       2024 6:48 PM  DISCH DATE:        2024 4:22 PM  RESPONDING  PROVIDER #:        [id number]           QUERY TEXT:    Acute respiratory failure with hypoxia is documented in the Discharge summary, however, Acute respiratory failure is not mentioned elsewhere in the medical record. Please confirm/clarify the documented diagnosis of Acute respiratory failure with hypoxia for this admission.      The patient's Clinical Indicators include:  NOTED    DCS: Acute respiratory failure with hypoxia (POA)   At bedside, she was noted to have hypoxia with SPO2 in the low 80s requiring 2 LPM oxygen by nasal cannula  ED: Resp: clear to auscultation, no increased work of breathing  H&P: Pulmonary effort is normal. Normal breath sounds  RR: 10s>20   92-94% Room air (Day)  P/F: 392 >  300 ( 2-2.5L @ NOC)   CXR: Bilateral basilar atelectasis  Rib fractures x 2/Pneumonia/ HFrEF  Oxy IR   Advanced age     Thank you,  Maritza Schumacher RN, CCS  Clinical Documentation Integrity  [email or web address]  Options provided:   -- Acute respiratory failure is present, (Pls document signs of respiratory distress i.e. dyspnea, nasal flaring, accessory muscle use...)   -- Hypoxia Only; Acute respiratory failure is not present   -- Atelectasis; Acute respiratory failure is not present   -- Other explanation, Pls specify      Query created by: Maritza Schumacher on 2024 12:55 PM    RESPONSE TEXT:    Hypoxia Only; Acute respiratory failure is not present          Electronically signed by:  SHORTY MARAVILLA MD 2024 10:03 PM

## 2024-06-14 NOTE — DOCUMENTATION QUERY
[facility]                                                                       Query Response Note      PATIENT:               MIREILLE CHAPA  ACCT #:                  [id number]  MRN:                     [id number]  :                      1927  ADMIT DATE:       6/3/2024 7:20 PM  DISCH DATE:        6/3/2024 11:29 PM  RESPONDING  PROVIDER #:        [id number]           QUERY TEXT:    When both osteoporosis and a trauma occur with a resulting fracture, coding clinic directs the  to query the physician for clarification of the type of fracture. Based on clinical findings, risk factors and treatment, can this fracture be further specified as:    NOTE:  If an appropriate response is not listed below, please respond with a new note.    Any questions please contact me via email [email or web address]    The patient's Clinical Indicators include:  Presents to the ED following a fall prior to arrival; at this time has associated left shoulder pain.   FINAL IMPRESSION  1. Closed nondisplaced fracture of acromial end of left clavicle, initial encounter   2. Fall from ground level         Past Medical History: Osteoporosis    Treatment or Monitoring:   X-ray  of the shoulder was read as negative but I believe there is a subtle distal fracture.    Risk Factors: N/A  Options provided:   -- Acromial end of Left Clavicle Fracture Secondary to Osteoporosis.   -- Acromial end of Left Clavicle Fracture Due to Trauma.   -- Acromial end of Left Clavicle Fracture Other / Unspecified.   -- Other explanation, (please specify)   -- Unable to determine      Query created by: Agnieszka Singh on 2024 1:19 PM    RESPONSE TEXT:    Acromial end of Left Clavicle Fracture Due to Trauma.          Electronically signed by:  DENNIS SORIANO MD 2024 12:00 AM